# Patient Record
Sex: FEMALE | Race: WHITE | NOT HISPANIC OR LATINO | Employment: UNEMPLOYED | URBAN - METROPOLITAN AREA
[De-identification: names, ages, dates, MRNs, and addresses within clinical notes are randomized per-mention and may not be internally consistent; named-entity substitution may affect disease eponyms.]

---

## 2017-01-10 ENCOUNTER — OFFICE VISIT (OUTPATIENT)
Dept: MEDICAL GROUP | Facility: MEDICAL CENTER | Age: 32
End: 2017-01-10
Attending: NURSE PRACTITIONER
Payer: MEDICAID

## 2017-01-10 VITALS
WEIGHT: 234 LBS | OXYGEN SATURATION: 96 % | HEART RATE: 68 BPM | DIASTOLIC BLOOD PRESSURE: 70 MMHG | BODY MASS INDEX: 38.99 KG/M2 | TEMPERATURE: 97.1 F | SYSTOLIC BLOOD PRESSURE: 130 MMHG | HEIGHT: 65 IN | RESPIRATION RATE: 16 BRPM

## 2017-01-10 DIAGNOSIS — L29.9 ITCHING: ICD-10-CM

## 2017-01-10 DIAGNOSIS — L60.0 INGROWN TOENAIL: ICD-10-CM

## 2017-01-10 DIAGNOSIS — J45.20 MILD INTERMITTENT ASTHMA, UNCOMPLICATED: ICD-10-CM

## 2017-01-10 DIAGNOSIS — B35.1 NAIL FUNGAL INFECTION: ICD-10-CM

## 2017-01-10 DIAGNOSIS — B35.1 TOENAIL FUNGUS: ICD-10-CM

## 2017-01-10 DIAGNOSIS — J45.20 MILD INTERMITTENT ASTHMA WITHOUT COMPLICATION: ICD-10-CM

## 2017-01-10 DIAGNOSIS — G89.29 CHRONIC RIGHT-SIDED LOW BACK PAIN WITHOUT SCIATICA: ICD-10-CM

## 2017-01-10 DIAGNOSIS — E03.4 HYPOTHYROIDISM DUE TO ACQUIRED ATROPHY OF THYROID: ICD-10-CM

## 2017-01-10 DIAGNOSIS — M54.50 ACUTE RIGHT-SIDED LOW BACK PAIN WITHOUT SCIATICA: ICD-10-CM

## 2017-01-10 DIAGNOSIS — M54.50 CHRONIC RIGHT-SIDED LOW BACK PAIN WITHOUT SCIATICA: ICD-10-CM

## 2017-01-10 DIAGNOSIS — E55.9 VITAMIN D DEFICIENCY: ICD-10-CM

## 2017-01-10 DIAGNOSIS — F99 PSYCHIATRIC DISORDER: ICD-10-CM

## 2017-01-10 DIAGNOSIS — F19.10 POLYSUBSTANCE ABUSE (HCC): ICD-10-CM

## 2017-01-10 PROCEDURE — 99212 OFFICE O/P EST SF 10 MIN: CPT | Performed by: NURSE PRACTITIONER

## 2017-01-10 PROCEDURE — 99214 OFFICE O/P EST MOD 30 MIN: CPT | Performed by: NURSE PRACTITIONER

## 2017-01-10 RX ORDER — GABAPENTIN 300 MG/1
300 CAPSULE ORAL 2 TIMES DAILY
Qty: 60 CAP | Refills: 5 | Status: SHIPPED | OUTPATIENT
Start: 2017-01-10 | End: 2017-11-09

## 2017-01-10 RX ORDER — LIDOCAINE 50 MG/G
OINTMENT TOPICAL
Qty: 1 TUBE | Refills: 5 | Status: SHIPPED | OUTPATIENT
Start: 2017-01-10 | End: 2017-11-09

## 2017-01-10 RX ORDER — LEVOTHYROXINE SODIUM 0.03 MG/1
25 TABLET ORAL
Qty: 30 TAB | Refills: 5 | Status: SHIPPED | OUTPATIENT
Start: 2017-01-10 | End: 2017-11-09 | Stop reason: SDUPTHER

## 2017-01-10 RX ORDER — ALBUTEROL SULFATE 2.5 MG/3ML
2.5 SOLUTION RESPIRATORY (INHALATION) EVERY 4 HOURS PRN
Qty: 75 ML | Refills: 3 | Status: SHIPPED | OUTPATIENT
Start: 2017-01-10 | End: 2017-11-30 | Stop reason: SDUPTHER

## 2017-01-10 RX ORDER — PREDNISONE 10 MG/1
TABLET ORAL
Qty: 10 TAB | Refills: 0 | Status: SHIPPED | OUTPATIENT
Start: 2017-01-10 | End: 2017-11-09

## 2017-01-10 RX ORDER — HYDROXYZINE HYDROCHLORIDE 25 MG/1
25 TABLET, FILM COATED ORAL DAILY
Qty: 30 TAB | Refills: 3 | Status: SHIPPED | OUTPATIENT
Start: 2017-01-10 | End: 2017-11-09

## 2017-01-10 RX ORDER — IBUPROFEN 800 MG/1
400 TABLET ORAL EVERY 8 HOURS PRN
Qty: 30 TAB | Refills: 3 | Status: SHIPPED | OUTPATIENT
Start: 2017-01-10 | End: 2017-02-28

## 2017-01-10 RX ORDER — ALBUTEROL SULFATE 90 UG/1
2 AEROSOL, METERED RESPIRATORY (INHALATION) EVERY 6 HOURS PRN
Qty: 8.5 G | Refills: 5 | Status: SHIPPED | OUTPATIENT
Start: 2017-01-10 | End: 2017-11-30 | Stop reason: SDUPTHER

## 2017-01-10 NOTE — MR AVS SNAPSHOT
"        Kaylie Nietobarbara   1/10/2017 3:50 PM   Office Visit   MRN: [id number]    [facility]   Dept Phone:  [phone]    Description:  Female : 1985   Provider:  TIANA Her           Reason for Visit     Back Pain     Medication Refill           Allergies as of 1/10/2017     Allergen Noted Reactions    Penicillins 2007   Anaphylaxis    Morphine 2011   Rash    Rash only      You were diagnosed with     Mild intermittent asthma without complication   [977914]       Acute right-sided low back pain without sciatica   [3376147]       Hypothyroidism due to acquired atrophy of thyroid   [9922787]       Chronic right-sided low back pain without sciatica   [8766792]       Vitamin D deficiency   [1885062]       Mild intermittent asthma, uncomplicated   [176323]       Itching   [593170]       Ingrown toenail   [964321]       Nail fungal infection   [277862]       Polysubstance abuse   [434432]       Psychiatric disorder   [482630]       Toenail fungus   [738326]         Vital Signs     Blood Pressure Pulse Temperature Respirations Height Weight    130/70 mmHg 68 36.2 °C (97.1 °F) 16 1.651 m (5' 5\") 106.142 kg (234 lb)    Body Mass Index Oxygen Saturation Smoking Status             38.94 kg/m2 96% Current Every Day Smoker         Basic Information     Date Of Birth Sex Race Ethnicity Preferred Language    1985 Female White Non- English      Problem List              ICD-10-CM Priority Class Noted - Resolved    Polysubstance abuse F19.10   1/15/2012 - Present    Routine health maintenance Z00.00   2016 - Present    Psychiatric disorder F99   2016 - Present    Tobacco use Z72.0   2016 - Present    Overweight E66.3   2016 - Present    Mild intermittent asthma J45.20   2016 - Present    Toenail fungus B35.1   2016 - Present    Right-sided low back pain without sciatica M54.5   2016 - Present    Hypothyroidism due to acquired " atrophy of thyroid E03.4   5/31/2016 - Present    Vitamin D deficiency E55.9   5/31/2016 - Present    Encounter for sterilization Z30.2   6/16/2016 - Present    Dysmenorrhea N94.6   6/16/2016 - Present    Vaginal discharge N89.8   10/11/2016 - Present    Sore throat J02.9   10/31/2016 - Present      Health Maintenance        Date Due Completion Dates    IMM DTaP/Tdap/Td Vaccine (1 - Tdap) 1/15/2003 ---    IMM PNEUMOCOCCAL 19-64 (ADULT) MEDIUM RISK SERIES (1 of 1 - PPSV23) 1/15/2003 ---    PAP SMEAR 8/8/2016 8/8/2013    IMM INFLUENZA (1) 9/1/2016 ---            Current Immunizations     No immunizations on file.      Below and/or attached are the medications your provider expects you to take. Review all of your home medications and newly ordered medications with your provider and/or pharmacist. Follow medication instructions as directed by your provider and/or pharmacist. Please keep your medication list with you and share with your provider. Update the information when medications are discontinued, doses are changed, or new medications (including over-the-counter products) are added; and carry medication information at all times in the event of emergency situations     Allergies:  PENICILLINS - Anaphylaxis     MORPHINE - Rash               Medications  Valid as of: January 10, 2017 -  4:41 PM    Generic Name Brand Name Tablet Size Instructions for use    Albuterol Sulfate (Aero Soln) albuterol 108 (90 BASE) MCG/ACT Inhale 2 Puffs by mouth every 6 hours as needed for Shortness of Breath.        Albuterol Sulfate (Nebu Soln) PROVENTIL 2.5mg/3ml 3 mL by Nebulization route every four hours as needed for Shortness of Breath.        Cholecalciferol (Cap) Cholecalciferol 2000 UNIT Take 1 Cap by mouth every day.        Clotrimazole-Betamethasone (Cream) LOTRISONE 1-0.05 % Apply to toe twice a day        Gabapentin (Cap) NEURONTIN 300 MG Take 1 Cap by mouth 2 Times a Day.        HydrOXYzine HCl (Tab) ATARAX 25 MG Take 1 Tab  by mouth every day.        Ibuprofen (Tab) MOTRIN 800 MG Take 0.5 Tabs by mouth every 8 hours as needed.        Levothyroxine Sodium (Tab) SYNTHROID 25 MCG Take 1 Tab by mouth Every morning on an empty stomach.        Lidocaine (Ointment) XYLOCAINE 5 % Apply once a day to low back        Misc. Devices (Misc) Misc. Devices  Nebulizer for use with Albuterol solution at home Dx Asthma J45.20        Nicotine (PATCH 24 HR) NICODERM 14 MG/24HR Apply 1 Patch to skin as directed every 24 hours.        Nicotine (PATCH 24 HR) NICODERM 7 MG/24HR Apply 1 Patch to skin as directed every 24 hours.        PredniSONE (Tab) DELTASONE 10 MG Take 10 mg of prednisone daily for 10 days.        RisperiDONE (Tab) RISPERDAL 3 MG Take 3 mg by mouth every day.        TraZODone HCl (Tab) DESYREL 100 MG Take 200 mg by mouth every evening.        .                 Medicines prescribed today were sent to:     [address] [address]    Phone: [phone] Fax: [phone]    Open 24 Hours?: No      Medication refill instructions:       If your prescription bottle indicates you have medication refills left, it is not necessary to call your provider’s office. Please contact your pharmacy and they will refill your medication.    If your prescription bottle indicates you do not have any refills left, you may request refills at any time through one of the following ways: The online [employer] system (except Urgent Care), by calling your provider’s office, or by asking your pharmacy to contact your provider’s office with a refill request. Medication refills are processed only during regular business hours and may not be available until the next business day. Your provider may request additional information or to have a follow-up visit with you prior to refilling your medication.   *Please Note: Medication refills are assigned a new Rx number when refilled electronically. Your pharmacy may  indicate that no refills were authorized even though a new prescription for the same medication is available at the pharmacy. Please request the medicine by name with the pharmacy before contacting your provider for a refill.        Referral     A referral request has been sent to our patient care coordination department. Please allow 3-5 business days for us to process this request and contact you either by phone or mail. If you do not hear from us by the 5th business day, please call us at [phone].           MyChart Status: Patient Declined

## 2017-01-10 NOTE — PROGRESS NOTES
"    Chief Complaint: Med refills. Toenail-foot pain    HPI:  Kaylie presents to the clinic for back pain and refill of Inhaler    Established with    Psychiatry-DR Castano and in the [facility]    Her PMH includes    Anxiety, Depression, Bipolar Disorder  Marijuana use  Poly-Substance Abuse  Asthma  Tobacco use-Smoker  Vitamin D Deficiency  Over-weight  Hypothyroid  Toenail Fungus  Low Back Pain (Right sided without sciatica)    Review of Records shows  10/31/16 Clinic Visit for cough, sore throat and congestion. Tx as Bronchitis w Z albert, Prednisone, Robitussin DM.    10/11/16 Clinic Visit for Vaginal Discharge, Tx with Flagyl.    Toenail Fungus, Ingrown Toenail  Pt reports no change in right great toe nail. Reports using anti fungal cream and no improvement. Right great toe nail is   'super thick\" and \"digging into my skin\".   We discussed that toenail removal is probably warranted.  No Podiatry available, will refer to general surgeon.  If not able, then confer and see if able to do in clinic in future.    Mild intermittent asthma  Pt is a smoker and has asthma.  Not ready to quit. Reports needs refill of Albuterol Inhaler.  Ran out of albuterol inhaler 2 months ago. Reports has had tight wheezing on and off the past month.  We discussed restarting inhaler Rx and rx for nebulizer for home use. Pt not ready to quit smoking. Denies cough or fever.    Polysubstance abuse  Pt continues in [facility] court and states is drug free  And hoping to graduate from program in next few months.  Continues to smoke cigarettes ~ 1/2 ppd.   Has nicotine patches but not ready to quit despite our discussion of health hazards of smoking.    Hypothyroidism due to acquired atrophy of thyroid  Pt continues to take Synthroid 25 mcg/day. Denies concerns  Last TSH= 1.900 in August 2016. Discussed in next 3-6 months I recommend f/u TSH.    Vitamin D deficiency  Pt has known vitamin d deficiency.  Reports ran out " "of Vitamin d.  Has refills at pharmacy. Have instructed Pt to re start daily low dose vitamin d.    Psychiatric disorder  Pt continues with Dr Castano and [facility].  Continues on Trazodone and Risperdal.   Denies suicidal ideation and reports mood has been \"good\"        Patient Active Problem List    Diagnosis Date Noted   • Sore throat 10/31/2016   • Vaginal discharge 10/11/2016   • Encounter for sterilization 06/16/2016   • Dysmenorrhea 06/16/2016   • Hypothyroidism due to acquired atrophy of thyroid 05/31/2016   • Vitamin D deficiency 05/31/2016   • Right-sided low back pain without sciatica 05/12/2016   • Routine health maintenance 04/14/2016   • Psychiatric disorder 04/14/2016   • Tobacco use 04/14/2016   • Overweight 04/14/2016   • Mild intermittent asthma 04/14/2016   • Toenail fungus 04/14/2016   • Polysubstance abuse 01/15/2012       Allergies:Penicillins and Morphine    Current Outpatient Prescriptions   Medication Sig Dispense Refill   • gabapentin (NEURONTIN) 300 MG Cap Take 1 Cap by mouth 2 Times a Day. 60 Cap 5   • levothyroxine (SYNTHROID) 25 MCG Tab Take 1 Tab by mouth Every morning on an empty stomach. 30 Tab 5   • ibuprofen (MOTRIN) 800 MG Tab Take 0.5 Tabs by mouth every 8 hours as needed. 30 Tab 3   • Cholecalciferol 2000 UNIT Cap Take 1 Cap by mouth every day. 30 Cap 6   • albuterol 108 (90 BASE) MCG/ACT Aero Soln inhalation aerosol Inhale 2 Puffs by mouth every 6 hours as needed for Shortness of Breath. 8.5 g 5   • hydrOXYzine (ATARAX) 25 MG Tab Take 1 Tab by mouth every day. 30 Tab 3   • predniSONE (DELTASONE) 10 MG Tab Take 10 mg of prednisone daily for 10 days. 10 Tab 0   • Misc. Devices Misc Nebulizer for use with Albuterol solution at home Dx Asthma J45.20 1 Each 0   • albuterol (PROVENTIL) 2.5mg/3ml Nebu Soln solution for nebulization 3 mL by Nebulization route every four hours as needed for Shortness of Breath. 75 mL 3   • lidocaine (XYLOCAINE) 5 % Ointment Apply once a day to low back 1 " "Tube 5   • clotrimazole-betamethasone (LOTRISONE) 1-0.05 % Cream Apply to toe twice a day 1 Tube 2   • risperidone (RISPERDAL) 3 MG Tab Take 3 mg by mouth every day.     • trazodone (DESYREL) 100 MG Tab Take 200 mg by mouth every evening.     • nicotine (NICODERM) 14 MG/24HR PATCH 24 HR Apply 1 Patch to skin as directed every 24 hours. 30 Patch 0   • nicotine (NICODERM) 7 MG/24HR PATCH 24 HR Apply 1 Patch to skin as directed every 24 hours. 30 Patch 1     No current facility-administered medications for this visit.       Social History   Substance Use Topics   • Smoking status: Current Every Day Smoker -- 0.50 packs/day for 11 years     Types: Cigarettes   • Smokeless tobacco: Never Used      Comment:  Patient has cut down to less than half a pack a day.   • Alcohol Use: No      Comment: does not drink       Family History   Problem Relation Age of Onset   • Diabetes Mother      Insulin   • Hypertension Mother    • Alcohol/Drug Mother        ROS:  Review of Systems   See HPI Above    Exam:  Blood pressure 130/70, pulse 68, temperature 36.2 °C (97.1 °F), resp. rate 16, height 1.651 m (5' 5\"), weight 106.142 kg (234 lb), SpO2 96 %.  General:  Well nourished, over-weight, well developed female in NAD  HENT:Head is grossly normal. PERRL.  Neck: Supple. Trachea is midline.  Pulmonary: Bilat expiratory wheezing to ausculation .  Normal effort. No rales, ronchi.  Cardiovascular: Regular rate and rhythm.  Abdomen-Abdomen is soft, No tenderness.  Upper extremities- Strong = . Good ROM  Lower extremities- neg for edema, redness, tenderness except Right Great toenail very thick and malformed and tender medial aspect of nail bed.  No redness.   Neuro- A & O x 4. Speech clear and appropriate.     Current medications, allergies, and problem list reviewed with patient and updated in  [facility] today.    Assessment/Plan:  1. Mild intermittent asthma without complication  predniSONE (DELTASONE) 10 MG Tab daily for 10 days    Misc. " Devices Misc-Nebulizer for home use.    albuterol (PROVENTIL) 2.5mg/3ml Nebu Soln solution for nebulization  Counseled to quit smoking, but not ready.   2. Acute right-sided low back pain without sciatica  gabapentin (NEURONTIN) 300 MG Cap-refill   3. Hypothyroidism due to acquired atrophy of thyroid  levothyroxine (SYNTHROID) 25 MCG Tab-refill   4. Chronic right-sided low back pain without sciatica  ibuprofen (MOTRIN) 800 MG Tab-refill    lidocaine (XYLOCAINE) 5 % Ointment-refill   5. Vitamin D deficiency  Cholecalciferol 2000 UNIT Cap-refill   6. Mild intermittent asthma, uncomplicated  albuterol 108 (90 BASE) MCG/ACT Aero Soln inhalation aerosol=-refill   7. Itching  hydrOXYzine (ATARAX) 25 MG Tab-refill   8. Ingrown toenail  REFERRAL TO GENERAL SURGERY  Daily epsom salts or soap and water soaks   9. Nail fungal infection  REFERRAL TO GENERAL SURGERY   10. Polysubstance abuse  Continue in program. Avoid any drugs or alcohol.   11. Psychiatric disorder  Continue meds per Dr Castano.   Follow up in 3 months. Call or return if questions, concerns, or worsening condition.

## 2017-01-11 NOTE — ASSESSMENT & PLAN NOTE
Pt has known vitamin d deficiency.  Reports ran out of Vitamin d.  Has refills at pharmacy. Have instructed Pt to re start daily low dose vitamin d.

## 2017-01-11 NOTE — ASSESSMENT & PLAN NOTE
Pt is a smoker and has asthma.  Not ready to quit. Reports needs refill of Albuterol Inhaler.  Ran out of albuterol inhaler 2 months ago. Reports has had tight wheezing on and off the past month.  We discussed restarting inhaler Rx and rx for nebulizer for home use. Pt not ready to quit smoking. Denies cough or fever.

## 2017-01-11 NOTE — ASSESSMENT & PLAN NOTE
"Pt reports no change in right great toe nail. Reports using anti fungal cream and no improvement. Right great toe nail is   'super thick\" and \"digging into my skin\".   We discussed that toenail removal is probably warranted.  No Podiatry available, will refer to general surgeon.  If not able, then confer and see if able to do in clinic in future.  "

## 2017-01-11 NOTE — ASSESSMENT & PLAN NOTE
"Pt continues with Dr Castano and [facility].  Continues on Trazodone and Risperdal.   Denies suicidal ideation and reports mood has been \"good\"    "

## 2017-01-11 NOTE — ASSESSMENT & PLAN NOTE
Pt continues to take Synthroid 25 mcg/day. Denies concerns  Last TSH= 1.900 in August 2016. Discussed in next 3-6 months I recommend f/u TSH.

## 2017-01-11 NOTE — ASSESSMENT & PLAN NOTE
Pt continues in [facility] mental health court and states is drug free  And hoping to graduate from program in next few months.  Continues to smoke cigarettes ~ 1/2 ppd.   Has nicotine patches but not ready to quit despite our discussion of health hazards of smoking.

## 2017-01-30 ENCOUNTER — OFFICE VISIT (OUTPATIENT)
Dept: MEDICAL GROUP | Facility: MEDICAL CENTER | Age: 32
End: 2017-01-30
Attending: NURSE PRACTITIONER
Payer: MEDICAID

## 2017-01-30 VITALS
WEIGHT: 243 LBS | SYSTOLIC BLOOD PRESSURE: 120 MMHG | BODY MASS INDEX: 40.48 KG/M2 | HEIGHT: 65 IN | HEART RATE: 84 BPM | RESPIRATION RATE: 20 BRPM | TEMPERATURE: 97.2 F | OXYGEN SATURATION: 96 % | DIASTOLIC BLOOD PRESSURE: 70 MMHG

## 2017-01-30 DIAGNOSIS — E03.4 HYPOTHYROIDISM DUE TO ACQUIRED ATROPHY OF THYROID: ICD-10-CM

## 2017-01-30 DIAGNOSIS — L60.0 INGROWN TOENAIL: ICD-10-CM

## 2017-01-30 DIAGNOSIS — R82.998 INAPPROPRIATELY LOW URINE SPECIFIC GRAVITY: ICD-10-CM

## 2017-01-30 LAB
APPEARANCE UR: CLEAR
BILIRUB UR STRIP-MCNC: NEGATIVE MG/DL
COLOR UR AUTO: YELLOW
GLUCOSE UR STRIP.AUTO-MCNC: NEGATIVE MG/DL
KETONES UR STRIP.AUTO-MCNC: NEGATIVE MG/DL
LEUKOCYTE ESTERASE UR QL STRIP.AUTO: NEGATIVE
NITRITE UR QL STRIP.AUTO: NEGATIVE
PH UR STRIP.AUTO: 5 [PH] (ref 5–8)
PROT UR QL STRIP: NEGATIVE MG/DL
RBC UR QL AUTO: NEGATIVE
SP GR UR STRIP.AUTO: 1.02
UROBILINOGEN UR STRIP-MCNC: NEGATIVE MG/DL

## 2017-01-30 PROCEDURE — 99213 OFFICE O/P EST LOW 20 MIN: CPT | Performed by: NURSE PRACTITIONER

## 2017-01-30 PROCEDURE — 99214 OFFICE O/P EST MOD 30 MIN: CPT | Performed by: NURSE PRACTITIONER

## 2017-01-30 NOTE — ASSESSMENT & PLAN NOTE
Pt reports has had diluted urine when taking court drug tests.  Pt reports is drinking coffee and poweraide.  3 cups a coffee before test.   Discussed she should not drink poweraide or coffee and to monitor her fluid intake.

## 2017-01-30 NOTE — ASSESSMENT & PLAN NOTE
Right big toenail is still causing pain.   Has not got call back from surgeon r/t surgical procedure.  Pt to continue soaking in epsom salts and to call again [facility] for appt.

## 2017-01-30 NOTE — ASSESSMENT & PLAN NOTE
Pt continues to take her Synthroid 25 mcg/day.  Denies palpitations or fatigue. Her last TSH= 1.900 in August, 2016.

## 2017-01-30 NOTE — MR AVS SNAPSHOT
"        Kaylie Burciaga   2017 3:10 PM   Office Visit   MRN: [id number]    Department[facility]   Dept Phone:  [phone]    Description:  Female : 1985   Provider:  TIANA Her           Reason for Visit     Nail Problem           Allergies as of 2017     Allergen Noted Reactions    Penicillins 2007   Anaphylaxis    Morphine 2011   Rash    Rash only      You were diagnosed with     Inappropriately low urine specific gravity   [5818159]       Ingrown toenail   [253357]         Vital Signs     Blood Pressure Pulse Temperature Respirations Height Weight    120/70 mmHg 84 36.2 °C (97.2 °F) 20 1.651 m (5' 5\") 110.224 kg (243 lb)    Body Mass Index Oxygen Saturation Last Menstrual Period Smoking Status          40.44 kg/m2 96% 12/10/2016 Current Every Day Smoker        Basic Information     Date Of Birth Sex Race Ethnicity Preferred Language    1985 Female White Non- English      Your appointments     2017  2:30 PM   Established Patient with TIANA Her   [facility] ([facility])    [address] [place] [id number]   [phone]           You will be receiving a confirmation call a few days before your appointment from our automated call confirmation system.              Problem List              ICD-10-CM Priority Class Noted - Resolved    Polysubstance abuse F19.10   1/15/2012 - Present    Routine health maintenance Z00.00   2016 - Present    Psychiatric disorder F99   2016 - Present    Tobacco use Z72.0   2016 - Present    Overweight E66.3   2016 - Present    Mild intermittent asthma J45.20   2016 - Present    Toenail fungus B35.1   2016 - Present    Right-sided low back pain without sciatica M54.5   2016 - Present    Hypothyroidism due to acquired atrophy of thyroid E03.4   2016 - Present    Vitamin D deficiency E55.9   2016 - Present    Encounter for sterilization " Z30.2   6/16/2016 - Present    Dysmenorrhea N94.6   6/16/2016 - Present    Vaginal discharge N89.8   10/11/2016 - Present    Sore throat J02.9   10/31/2016 - Present    Inappropriately low urine specific gravity R82.99   1/30/2017 - Present    Ingrown toenail L60.0   1/30/2017 - Present      Health Maintenance        Date Due Completion Dates    IMM DTaP/Tdap/Td Vaccine (1 - Tdap) 1/15/2003 ---    IMM PNEUMOCOCCAL 19-64 (ADULT) MEDIUM RISK SERIES (1 of 1 - PPSV23) 1/15/2003 ---    PAP SMEAR 8/8/2016 8/8/2013    IMM INFLUENZA (1) 9/1/2016 ---            Current Immunizations     No immunizations on file.      Below and/or attached are the medications your provider expects you to take. Review all of your home medications and newly ordered medications with your provider and/or pharmacist. Follow medication instructions as directed by your provider and/or pharmacist. Please keep your medication list with you and share with your provider. Update the information when medications are discontinued, doses are changed, or new medications (including over-the-counter products) are added; and carry medication information at all times in the event of emergency situations     Allergies:  PENICILLINS - Anaphylaxis     MORPHINE - Rash               Medications  Valid as of: January 30, 2017 -  3:27 PM    Generic Name Brand Name Tablet Size Instructions for use    Albuterol Sulfate (Aero Soln) albuterol 108 (90 BASE) MCG/ACT Inhale 2 Puffs by mouth every 6 hours as needed for Shortness of Breath.        Albuterol Sulfate (Nebu Soln) PROVENTIL 2.5mg/3ml 3 mL by Nebulization route every four hours as needed for Shortness of Breath.        Cholecalciferol (Cap) Cholecalciferol 2000 UNIT Take 1 Cap by mouth every day.        Clotrimazole-Betamethasone (Cream) LOTRISONE 1-0.05 % Apply to toe twice a day        Gabapentin (Cap) NEURONTIN 300 MG Take 1 Cap by mouth 2 Times a Day.        HydrOXYzine HCl (Tab) ATARAX 25 MG Take 1 Tab by mouth  every day.        Ibuprofen (Tab) MOTRIN 800 MG Take 0.5 Tabs by mouth every 8 hours as needed.        Levothyroxine Sodium (Tab) SYNTHROID 25 MCG Take 1 Tab by mouth Every morning on an empty stomach.        Lidocaine (Ointment) XYLOCAINE 5 % Apply once a day to low back        Misc. Devices (Misc) Misc. Devices  Nebulizer for use with Albuterol solution at home Dx Asthma J45.20        Nicotine (PATCH 24 HR) NICODERM 14 MG/24HR Apply 1 Patch to skin as directed every 24 hours.        Nicotine (PATCH 24 HR) NICODERM 7 MG/24HR Apply 1 Patch to skin as directed every 24 hours.        PredniSONE (Tab) DELTASONE 10 MG Take 10 mg of prednisone daily for 10 days.        RisperiDONE (Tab) RISPERDAL 3 MG Take 3 mg by mouth every day.        TraZODone HCl (Tab) DESYREL 100 MG Take 200 mg by mouth every evening.        .                 Medicines prescribed today were sent to:     [address] [address]    Phone: [phone] Fax: [phone]    Open 24 Hours?: No      Medication refill instructions:       If your prescription bottle indicates you have medication refills left, it is not necessary to call your provider’s office. Please contact your pharmacy and they will refill your medication.    If your prescription bottle indicates you do not have any refills left, you may request refills at any time through one of the following ways: The online [employer] system (except Urgent Care), by calling your provider’s office, or by asking your pharmacy to contact your provider’s office with a refill request. Medication refills are processed only during regular business hours and may not be available until the next business day. Your provider may request additional information or to have a follow-up visit with you prior to refilling your medication.   *Please Note: Medication refills are assigned a new Rx number when refilled electronically. Your pharmacy may indicate  that no refills were authorized even though a new prescription for the same medication is available at the pharmacy. Please request the medicine by name with the pharmacy before contacting your provider for a refill.        Your To Do List     Future Labs/Procedures Complete By Expires    BASIC METABOLIC PANEL  As directed 1/30/2018    OSMOLALITY SERUM  As directed 1/30/2018    OSMOLALITY URINE  As directed 1/30/2018    URINE SODIUM, 24 HR  As directed 1/30/2018         MyChart Status: Patient Declined

## 2017-01-30 NOTE — PROGRESS NOTES
Chief Complaint: Dilute urine with court drug screens, ingrown toenail.    HPI:  Kaylie presents to the clinic for concern over toenail issues and concern about urine being dilute for urine drug tests at court.    Established with    Psychiatry-DR Castano and in the [facility]    Her PMH includes    Anxiety, Depression, Bipolar Disorder  Marijuana use  Poly-Substance Abuse  Asthma  Tobacco use-Smoker  Vitamin D Deficiency  Over-weight  Hypothyroid  Toenail Fungus  Low Back Pain (Right sided without sciatica)    Review of Records shows  1/10/16 Clinic visit fo med refills and toenail/foot pain., Asthma exacerbation. Rx for Prednisone, Rx for home nebulizer, Med refills. Refer to Surgeon r/t ingrown toenail.  10/31/16 Clinic Visit for cough, sore throat and congestion. Tx as Bronchitis w Z albert, Prednisone, Robitussin DM.    10/11/16 Clinic Visit for Vaginal Discharge, Tx with Flagyl.    Inappropriately low urine specific gravity  Pt reports has had diluted urine when taking court drug tests.  Pt reports is drinking coffee and poweraide.  3 cups a coffee before test.   Discussed she should not drink poweraide or coffee and to monitor her fluid intake.    I researched one possibility which involves a possible side effect of Trazodone which is SIADH.  Discussed this with Dr Randolph, Herman ARCHER and will order urine tests to compare to serum osmolality  And will write note for patient stating it is a possible factor.    Ingrown toenail  Right big toenail is still causing pain.   Has not got call back from surgeon r/t surgical procedure.  Pt to continue soaking in epsom salts and to call again [facility] for appt.    Hypothyroidism due to acquired atrophy of thyroid  Pt continues to take her Synthroid 25 mcg/day.  Denies palpitations or fatigue. Her last TSH= 1.900 in August, 2016.      Patient Active Problem List    Diagnosis Date Noted   • Inappropriately low urine specific gravity 01/30/2017   •  Ingrown toenail 01/30/2017   • Sore throat 10/31/2016   • Vaginal discharge 10/11/2016   • Encounter for sterilization 06/16/2016   • Dysmenorrhea 06/16/2016   • Hypothyroidism due to acquired atrophy of thyroid 05/31/2016   • Vitamin D deficiency 05/31/2016   • Right-sided low back pain without sciatica 05/12/2016   • Routine health maintenance 04/14/2016   • Psychiatric disorder 04/14/2016   • Tobacco use 04/14/2016   • Overweight 04/14/2016   • Mild intermittent asthma 04/14/2016   • Toenail fungus 04/14/2016   • Polysubstance abuse 01/15/2012       Allergies:Penicillins and Morphine    Current Outpatient Prescriptions   Medication Sig Dispense Refill   • gabapentin (NEURONTIN) 300 MG Cap Take 1 Cap by mouth 2 Times a Day. 60 Cap 5   • levothyroxine (SYNTHROID) 25 MCG Tab Take 1 Tab by mouth Every morning on an empty stomach. 30 Tab 5   • ibuprofen (MOTRIN) 800 MG Tab Take 0.5 Tabs by mouth every 8 hours as needed. 30 Tab 3   • Cholecalciferol 2000 UNIT Cap Take 1 Cap by mouth every day. 30 Cap 6   • albuterol 108 (90 BASE) MCG/ACT Aero Soln inhalation aerosol Inhale 2 Puffs by mouth every 6 hours as needed for Shortness of Breath. 8.5 g 5   • hydrOXYzine (ATARAX) 25 MG Tab Take 1 Tab by mouth every day. 30 Tab 3   • predniSONE (DELTASONE) 10 MG Tab Take 10 mg of prednisone daily for 10 days. 10 Tab 0   • Misc. Devices Misc Nebulizer for use with Albuterol solution at home Dx Asthma J45.20 1 Each 0   • albuterol (PROVENTIL) 2.5mg/3ml Nebu Soln solution for nebulization 3 mL by Nebulization route every four hours as needed for Shortness of Breath. 75 mL 3   • lidocaine (XYLOCAINE) 5 % Ointment Apply once a day to low back 1 Tube 5   • nicotine (NICODERM) 14 MG/24HR PATCH 24 HR Apply 1 Patch to skin as directed every 24 hours. 30 Patch 0   • nicotine (NICODERM) 7 MG/24HR PATCH 24 HR Apply 1 Patch to skin as directed every 24 hours. 30 Patch 1   • clotrimazole-betamethasone (LOTRISONE) 1-0.05 % Cream Apply to toe  "twice a day 1 Tube 2   • risperidone (RISPERDAL) 3 MG Tab Take 3 mg by mouth every day.     • trazodone (DESYREL) 100 MG Tab Take 200 mg by mouth every evening.       No current facility-administered medications for this visit.       Social History   Substance Use Topics   • Smoking status: Current Every Day Smoker -- 0.50 packs/day for 11 years     Types: Cigarettes   • Smokeless tobacco: Never Used      Comment:  Patient has cut down to less than half a pack a day.   • Alcohol Use: No      Comment: does not drink       Family History   Problem Relation Age of Onset   • Diabetes Mother      Insulin   • Hypertension Mother    • Alcohol/Drug Mother        ROS:  Review of Systems   See HPI Above    Exam:  Blood pressure 120/70, pulse 84, temperature 36.2 °C (97.2 °F), resp. rate 20, height 1.651 m (5' 5\"), weight 110.224 kg (243 lb), last menstrual period 12/10/2016, SpO2 96 %.  General:  Well nourished, over weight,well developed female in NAD  HENT:Head is grossly normal. PERRL.  Neck: Supple. Trachea is midline.  Pulmonary: Clear to ausculation .  Normal effort. No rales, ronchi, or wheezing.   Cardiovascular: Regular rate and rhythm.  Abdomen-Abdomen is soft, No tenderness.  Upper extremities- Strong = . Good ROM  Lower extremities- neg for edema, redness, tenderness except mild tenderness to right great toe with toenail lifted and somewhat ingrown.  Neuro- A & O x 4. Speech clear and appropriate.     Current medications, allergies, and problem list reviewed with patient and updated in  [facility] today.    Assessment/Plan:  1. Inappropriately low urine specific gravity  URINE SODIUM, 24 HR    OSMOLALITY URINE    BASIC METABOLIC PANEL    OSMOLALITY SERUM    POCT Urinalysis--> Specific Gravity= 1.025   ( not dilute today)  Letter for patient about dilute urine and possible effect by Trazodone.   2. Ingrown toenail  Epsom salt soaks BID as discussed.   Pt to call [facility] and given contact info.   3. " Hypothyroidism due to acquired atrophy of thyroid  Continue Levothyroxine 25 mcg/day.   Follow up in 1 month. Call or return if questions, concerns, or worsening condition.

## 2017-01-30 NOTE — Clinical Note
January 30, 2017       Patient: Kaylie Burciaga   YOB: 1985   Date of Visit: 1/30/2017         To Whom It May Concern:    It is my medical opinion that Kaylie Burciaga may have some mild to moderate urine dilution issues possibly induced by one of her Psychiatric medications , Trazodone which one side effect can be SIADH( Syndrome of inappropriate Anti-diuretic hormone).   It is a difficult diagnosis to confirm. I have ordered further lab serum and urine tests.  If it would help, I could refer her to Nephrology to assist in determining if this is the case.    Please take this in to consideration with Kaylie Burciaga    If you have any questions or concerns, please don't hesitate to call [phone]          Sincerely,          CARLOS ALBERTO Her. (Nurse Practitioner)  Electronically Signed

## 2017-01-31 ENCOUNTER — SUPERVISING PHYSICIAN REVIEW (OUTPATIENT)
Dept: MEDICAL GROUP | Facility: MEDICAL CENTER | Age: 32
End: 2017-01-31

## 2017-01-31 NOTE — PROGRESS NOTES
I have reviewed and/or discussed the encounter dated 1/30/17 with rosalina.  Face to face encounter/ direct observation : no  Suggestions as follows discussed the patient having a dilute urine during the Urine Drug Screen she had to further evaluate her appropriate use of her narcotic medication.   Discussed the fact she is also on psychiatric medications which increase the possibility she may have SIADH. The SIADH may lead to a dilute urine, so a urine osmolality, urine and serum Na level will also be obtained. If there is still questions about her having SIADH, may also consider referring to nephrology or endocrine for assistance in the evaluation.    Brent Randolph MD  The patient is noted

## 2017-02-21 ENCOUNTER — HOSPITAL ENCOUNTER (OUTPATIENT)
Dept: LAB | Facility: MEDICAL CENTER | Age: 32
End: 2017-02-21
Attending: NURSE PRACTITIONER
Payer: MEDICAID

## 2017-02-21 DIAGNOSIS — R82.998 INAPPROPRIATELY LOW URINE SPECIFIC GRAVITY: ICD-10-CM

## 2017-02-21 LAB
ANION GAP SERPL CALC-SCNC: 7 MMOL/L (ref 0–11.9)
BUN SERPL-MCNC: 12 MG/DL (ref 8–22)
CALCIUM SERPL-MCNC: 9.2 MG/DL (ref 8.5–10.5)
CHLORIDE SERPL-SCNC: 107 MMOL/L (ref 96–112)
CO2 SERPL-SCNC: 23 MMOL/L (ref 20–33)
CREAT SERPL-MCNC: 0.78 MG/DL (ref 0.5–1.4)
GLUCOSE SERPL-MCNC: 91 MG/DL (ref 65–99)
OSMOLALITY SERPL: 286 MOSM/KG H2O (ref 278–298)
OSMOLALITY UR: 711 MOSM/KG H2O (ref 300–900)
POTASSIUM SERPL-SCNC: 4.2 MMOL/L (ref 3.6–5.5)
SODIUM SERPL-SCNC: 137 MMOL/L (ref 135–145)

## 2017-02-21 PROCEDURE — 84300 ASSAY OF URINE SODIUM: CPT

## 2017-02-21 PROCEDURE — 36415 COLL VENOUS BLD VENIPUNCTURE: CPT

## 2017-02-21 PROCEDURE — 83935 ASSAY OF URINE OSMOLALITY: CPT

## 2017-02-21 PROCEDURE — 80048 BASIC METABOLIC PNL TOTAL CA: CPT

## 2017-02-21 PROCEDURE — 83930 ASSAY OF BLOOD OSMOLALITY: CPT

## 2017-02-23 LAB
CREAT 24H UR-MCNC: 20 MG/DL
CREAT 24H UR-MRATE: NORMAL MG/D (ref 700–1600)
SODIUM 24H UR-SCNC: 46 MMOL/L
SODIUM 24H UR-SRATE: NORMAL MMOL/D (ref 51–286)
SPECIMEN VOL ?TM UR: NORMAL ML

## 2017-02-28 ENCOUNTER — OFFICE VISIT (OUTPATIENT)
Dept: MEDICAL GROUP | Facility: MEDICAL CENTER | Age: 32
End: 2017-02-28
Attending: NURSE PRACTITIONER
Payer: MEDICAID

## 2017-02-28 VITALS
BODY MASS INDEX: 40.82 KG/M2 | HEART RATE: 92 BPM | TEMPERATURE: 97.2 F | DIASTOLIC BLOOD PRESSURE: 60 MMHG | RESPIRATION RATE: 20 BRPM | SYSTOLIC BLOOD PRESSURE: 115 MMHG | OXYGEN SATURATION: 96 % | HEIGHT: 65 IN | WEIGHT: 245 LBS

## 2017-02-28 DIAGNOSIS — M54.50 RIGHT-SIDED LOW BACK PAIN WITHOUT SCIATICA, UNSPECIFIED CHRONICITY: ICD-10-CM

## 2017-02-28 DIAGNOSIS — E66.01 MORBID OBESITY WITH BMI OF 40.0-44.9, ADULT (HCC): ICD-10-CM

## 2017-02-28 DIAGNOSIS — E03.4 HYPOTHYROIDISM DUE TO ACQUIRED ATROPHY OF THYROID: ICD-10-CM

## 2017-02-28 DIAGNOSIS — Z72.0 TOBACCO USE: ICD-10-CM

## 2017-02-28 DIAGNOSIS — L60.0 INGROWN TOENAIL: ICD-10-CM

## 2017-02-28 DIAGNOSIS — R82.998 INAPPROPRIATELY LOW URINE SPECIFIC GRAVITY: ICD-10-CM

## 2017-02-28 PROCEDURE — 99214 OFFICE O/P EST MOD 30 MIN: CPT | Performed by: NURSE PRACTITIONER

## 2017-02-28 PROCEDURE — 99213 OFFICE O/P EST LOW 20 MIN: CPT | Performed by: NURSE PRACTITIONER

## 2017-02-28 RX ORDER — DICLOFENAC SODIUM 75 MG/1
75 TABLET, DELAYED RELEASE ORAL 2 TIMES DAILY
Qty: 60 TAB | Refills: 2 | Status: SHIPPED | OUTPATIENT
Start: 2017-02-28 | End: 2017-11-09

## 2017-02-28 NOTE — ASSESSMENT & PLAN NOTE
Pt continues to take Synthroid 25 mcg/day.  IN august her TSH was very good;  Results for SHUN WHITFIELD (MRN [id number]) as of 2/28/2017 15:11   Ref. Range 8/1/2016 09:27   TSH Latest Ref Range: 0.300-3.700 uIU/mL 1.900   Will continue at current dose.

## 2017-02-28 NOTE — PROGRESS NOTES
Chief Complaint: REsults r/t dilute urine. Low back pain    HPI:  Kaylie presents to the clinic for results and f/u on her dilute urine issues.    Her PMH includes:  Anxiety, Depression, Bipolar Disorder  Marijuana use  Poly-Substance Abuse  Asthma  Tobacco use-Smoker  Vitamin D Deficiency  Over-weight  Hypothyroid  Toenail Fungus  Low Back Pain (Right sided without sciatica)    Referral Approved:  General Surgeon- [facility] r/t her ingrown toenail.    Review of Records shows  1/30/17 Clinic visit for f/u on dilute urine at drug testing for the program she is enrolled. , Ingrown Toenail---Labs ordered, Referral to Gen'l Surgeon. Letter written for Patient for her program about her urine being dilute may be related to her Psychiatric Medication(Trazodone).  1/10/16 Clinic visit fo med refills and toenail/foot pain., Asthma exacerbation. Rx for Prednisone, Rx for home nebulizer, Med refills. Refer to Surgeon r/t ingrown toenail.  10/31/16 Clinic Visit for cough, sore throat and congestion. Tx as Bronchitis w Z albert, Prednisone, Robitussin DM.  10/11/16 Clinic Visit for Vaginal Discharge, Tx with Flagyl.    Results REview:    2/21/17 Labs - Urine Sodium normal, Urine Osmolality normal, BMP normal, Serum Osmolality normal    Morbid obesity with BMI of 40.0-44.9, adult (HCC)  Pt is overweight. We discussed importance of decreasing intake of sugar/carbs, increasing exercise and limiting portion size.  Pt reports is eating a lot of chicken wraps and banana's.  Recommended she avoid bananas and eat less carbs.      Inappropriately low urine specific gravity  We reviewed her normal urine studies, normal BMP, and normal Serum Osmolality.  Pt reports in the past she drank too much coffee she had dilute urine.  Last Thursday had a test and no results yet, but only drank 7-up.  I recommended she not drink so much fluid every day on the days she may get tested. Pt knows at 5 am and knows if she will be  "test that afternoon.  WE discussed that day to limit fluid and not drink coffee that day.    Ingrown toenail  Pt reports has not called for an appt with the Surgeon for her ingrown toenail, right great toe. States \"is the same\" and I recommended she get nail removed and have given her contact info for [facility] and to make appt.    Right-sided low back pain without sciatica  Pt reports had more R> L low back pain and has been worse this week.  States she has only been taking Gabapentin 300 mg once a day and not at night  Also is taking Motrin 1-2 a day. Denies saddle paresthesia or loss of bowel or bladder control  But now states \"sometimes\" has some pains in her legs she believes is related to her back.    Discussed she should take her Gabapentin twice a day.  Wants to change NSAID.   Also patient not using Lidocaine ointment but will p/u today.  IS doing stretches learned at past Physical Therapy.    Tobacco use  Pt has patches but has not started patches.  Is smoking about 1/2 ppd. Discussed setting date to quit and then start patches.  Is stressed about drug testing at her program.  Denies cough or fever.    Hypothyroidism due to acquired atrophy of thyroid  Pt continues to take Synthroid 25 mcg/day.  IN august her TSH was very good;  Results for ROSEANN SHUN LIU (MRN [id number]) as of 2/28/2017 15:11   Ref. Range 8/1/2016 09:27   TSH Latest Ref Range: 0.300-3.700 uIU/mL 1.900   Will continue at current dose.      Patient Active Problem List    Diagnosis Date Noted   • Morbid obesity with BMI of 40.0-44.9, adult ([facility]) 02/28/2017   • Inappropriately low urine specific gravity 01/30/2017   • Ingrown toenail 01/30/2017   • Sore throat 10/31/2016   • Vaginal discharge 10/11/2016   • Encounter for sterilization 06/16/2016   • Dysmenorrhea 06/16/2016   • Hypothyroidism due to acquired atrophy of thyroid 05/31/2016   • Vitamin D deficiency 05/31/2016   • Right-sided low back pain without sciatica 05/12/2016 "   • Routine health maintenance 04/14/2016   • Psychiatric disorder 04/14/2016   • Tobacco use 04/14/2016   • Overweight 04/14/2016   • Mild intermittent asthma 04/14/2016   • Toenail fungus 04/14/2016   • Polysubstance abuse 01/15/2012       Allergies:Penicillins and Morphine    Current Outpatient Prescriptions   Medication Sig Dispense Refill   • diclofenac EC (VOLTAREN) 75 MG Tablet Delayed Response Take 1 Tab by mouth 2 times a day. Take with food 60 Tab 2   • gabapentin (NEURONTIN) 300 MG Cap Take 1 Cap by mouth 2 Times a Day. 60 Cap 5   • levothyroxine (SYNTHROID) 25 MCG Tab Take 1 Tab by mouth Every morning on an empty stomach. 30 Tab 5   • Cholecalciferol 2000 UNIT Cap Take 1 Cap by mouth every day. 30 Cap 6   • albuterol 108 (90 BASE) MCG/ACT Aero Soln inhalation aerosol Inhale 2 Puffs by mouth every 6 hours as needed for Shortness of Breath. 8.5 g 5   • hydrOXYzine (ATARAX) 25 MG Tab Take 1 Tab by mouth every day. 30 Tab 3   • predniSONE (DELTASONE) 10 MG Tab Take 10 mg of prednisone daily for 10 days. 10 Tab 0   • Misc. Devices Misc Nebulizer for use with Albuterol solution at home Dx Asthma J45.20 1 Each 0   • albuterol (PROVENTIL) 2.5mg/3ml Nebu Soln solution for nebulization 3 mL by Nebulization route every four hours as needed for Shortness of Breath. 75 mL 3   • lidocaine (XYLOCAINE) 5 % Ointment Apply once a day to low back 1 Tube 5   • nicotine (NICODERM) 14 MG/24HR PATCH 24 HR Apply 1 Patch to skin as directed every 24 hours. 30 Patch 0   • nicotine (NICODERM) 7 MG/24HR PATCH 24 HR Apply 1 Patch to skin as directed every 24 hours. 30 Patch 1   • clotrimazole-betamethasone (LOTRISONE) 1-0.05 % Cream Apply to toe twice a day 1 Tube 2   • risperidone (RISPERDAL) 3 MG Tab Take 3 mg by mouth every day.     • trazodone (DESYREL) 100 MG Tab Take 200 mg by mouth every evening.       No current facility-administered medications for this visit.       Social History   Substance Use Topics   • Smoking status:  "Current Every Day Smoker -- 0.50 packs/day for 11 years     Types: Cigarettes   • Smokeless tobacco: Never Used      Comment:  Patient has cut down to less than half a pack a day.   • Alcohol Use: No      Comment: does not drink       Family History   Problem Relation Age of Onset   • Diabetes Mother      Insulin   • Hypertension Mother    • Alcohol/Drug Mother        ROS:  Review of Systems   See HPI Above    Exam:  Blood pressure 115/60, pulse 92, temperature 36.2 °C (97.2 °F), resp. rate 20, height 1.651 m (5' 5\"), weight 111.131 kg (245 lb), last menstrual period 02/10/2017, SpO2 96 %.  General:  Well nourished, obese, well developed female in mild distress.  HENT:Head is grossly normal. PERRL.  Neck: Supple. Trachea is midline.  Pulmonary: Clear to ausculation .  Normal effort. No rales, ronchi, or wheezing.   Cardiovascular: Regular rate and rhythm.  Abdomen-Abdomen is soft, protuberant,  No tenderness.  Upper extremities- Strong = . Good ROM  Lower extremities- neg for edema, redness, tenderness.  Neuro- A & O x 4. Speech clear and appropriate.     Current medications, allergies, and problem list reviewed with patient and [facility].    Assessment/Plan:  1. Morbid obesity with BMI of 40.0-44.9, adult ([facility])  Patient identified as having weight management issue.  Appropriate orders and counseling given.   2. Inappropriately low urine specific gravity  Pt to decrease her fluid intake and avoid caffeine.   3. Ingrown toenail  Pt to soak in Epsom Salts and Warm water daily.  Pt to call [facility] for appt.   4. Right-sided low back pain without sciatica, unspecified chronicity  diclofenac EC (VOLTAREN) 75 MG Tablet Delayed Response (Stop Motrin)  Continue Gabapentin but take BID as prescribed and not just once a day.  Pt to restart Lidocaine Ointment to low back BID   5. Tobacco use  Pt to set quit date. Has nicotine patches but has not made quit date.    6. Hypothyroidism due to " acquired atrophy of thyroid  Continue Synthroid 25 mcg/day   Follow up in 6 weeks. Call or return if questions, concerns, or worsening condition.

## 2017-02-28 NOTE — ASSESSMENT & PLAN NOTE
Pt is overweight. We discussed importance of decreasing intake of sugar/carbs, increasing exercise and limiting portion size.  Pt reports is eating a lot of chicken wraps and banana's.  Recommended she avoid bananas and eat less carbs.

## 2017-02-28 NOTE — MR AVS SNAPSHOT
"        Kaylie Burciaga   2017 2:50 PM   Office Visit   MRN: [id number]    [facility]   Dept Phone:  [phone]    Description:  Female : 1985   Provider:  TIANA Her           Reason for Visit     Results           Allergies as of 2017     Allergen Noted Reactions    Penicillins 2007   Anaphylaxis    Morphine 2011   Rash    Rash only      You were diagnosed with     Morbid obesity with BMI of 40.0-44.9, adult (CMS-HCC)   [065336]       Inappropriately low urine specific gravity   [6764101]       Ingrown toenail   [374380]       Right-sided low back pain without sciatica, unspecified chronicity   [7791990]       Tobacco use   [080168]       Hypothyroidism due to acquired atrophy of thyroid   [0986646]         Vital Signs     Blood Pressure Pulse Temperature Respirations Height Weight    115/60 mmHg 92 36.2 °C (97.2 °F) 20 1.651 m (5' 5\") 111.131 kg (245 lb)    Body Mass Index Oxygen Saturation Last Menstrual Period Smoking Status          40.77 kg/m2 96% 02/10/2017 Current Every Day Smoker        Basic Information     Date Of Birth Sex Race Ethnicity Preferred Language    1985 Female White Non- English      Problem List              ICD-10-CM Priority Class Noted - Resolved    Polysubstance abuse F19.10   1/15/2012 - Present    Routine health maintenance Z00.00   2016 - Present    Psychiatric disorder F99   2016 - Present    Tobacco use Z72.0   2016 - Present    Overweight E66.3   2016 - Present    Mild intermittent asthma J45.20   2016 - Present    Toenail fungus B35.1   2016 - Present    Right-sided low back pain without sciatica M54.5   2016 - Present    Hypothyroidism due to acquired atrophy of thyroid E03.4   2016 - Present    Vitamin D deficiency E55.9   2016 - Present    Encounter for sterilization Z30.2   2016 - Present    Dysmenorrhea N94.6   2016 - Present    Vaginal " discharge N89.8   10/11/2016 - Present    Sore throat J02.9   10/31/2016 - Present    Inappropriately low urine specific gravity R82.99   1/30/2017 - Present    Ingrown toenail L60.0   1/30/2017 - Present    Morbid obesity with BMI of 40.0-44.9, adult ([facility]) E66.01, Z68.41   2/28/2017 - Present      Health Maintenance        Date Due Completion Dates    IMM DTaP/Tdap/Td Vaccine (1 - Tdap) 1/15/2003 ---    IMM PNEUMOCOCCAL 19-64 (ADULT) MEDIUM RISK SERIES (1 of 1 - PPSV23) 1/15/2003 ---    PAP SMEAR 8/8/2016 8/8/2013    IMM INFLUENZA (1) 9/1/2016 ---            Current Immunizations     No immunizations on file.      Below and/or attached are the medications your provider expects you to take. Review all of your home medications and newly ordered medications with your provider and/or pharmacist. Follow medication instructions as directed by your provider and/or pharmacist. Please keep your medication list with you and share with your provider. Update the information when medications are discontinued, doses are changed, or new medications (including over-the-counter products) are added; and carry medication information at all times in the event of emergency situations     Allergies:  PENICILLINS - Anaphylaxis     MORPHINE - Rash               Medications  Valid as of: February 28, 2017 -  3:14 PM    Generic Name Brand Name Tablet Size Instructions for use    Albuterol Sulfate (Aero Soln) albuterol 108 (90 BASE) MCG/ACT Inhale 2 Puffs by mouth every 6 hours as needed for Shortness of Breath.        Albuterol Sulfate (Nebu Soln) PROVENTIL 2.5mg/3ml 3 mL by Nebulization route every four hours as needed for Shortness of Breath.        Cholecalciferol (Cap) Cholecalciferol 2000 UNIT Take 1 Cap by mouth every day.        Clotrimazole-Betamethasone (Cream) LOTRISONE 1-0.05 % Apply to toe twice a day        Diclofenac Sodium (Tablet Delayed Response) VOLTAREN 75 MG Take 1 Tab by mouth 2 times a day. Take with food         Gabapentin (Cap) NEURONTIN 300 MG Take 1 Cap by mouth 2 Times a Day.        HydrOXYzine HCl (Tab) ATARAX 25 MG Take 1 Tab by mouth every day.        Levothyroxine Sodium (Tab) SYNTHROID 25 MCG Take 1 Tab by mouth Every morning on an empty stomach.        Lidocaine (Ointment) XYLOCAINE 5 % Apply once a day to low back        Misc. Devices (Misc) Misc. Devices  Nebulizer for use with Albuterol solution at home Dx Asthma J45.20        Nicotine (PATCH 24 HR) NICODERM 14 MG/24HR Apply 1 Patch to skin as directed every 24 hours.        Nicotine (PATCH 24 HR) NICODERM 7 MG/24HR Apply 1 Patch to skin as directed every 24 hours.        PredniSONE (Tab) DELTASONE 10 MG Take 10 mg of prednisone daily for 10 days.        RisperiDONE (Tab) RISPERDAL 3 MG Take 3 mg by mouth every day.        TraZODone HCl (Tab) DESYREL 100 MG Take 200 mg by mouth every evening.        .                 Medicines prescribed today were sent to:     [facility] [address] [address]    Phone: [phone] Fax: [phone]    Open 24 Hours?: No      Medication refill instructions:       If your prescription bottle indicates you have medication refills left, it is not necessary to call your provider’s office. Please contact your pharmacy and they will refill your medication.    If your prescription bottle indicates you do not have any refills left, you may request refills at any time through one of the following ways: The online [facility] system (except Urgent Care), by calling your provider’s office, or by asking your pharmacy to contact your provider’s office with a refill request. Medication refills are processed only during regular business hours and may not be available until the next business day. Your provider may request additional information or to have a follow-up visit with you prior to refilling your medication.   *Please Note: Medication refills are assigned a new Rx number when refilled  electronically. Your pharmacy may indicate that no refills were authorized even though a new prescription for the same medication is available at the pharmacy. Please request the medicine by name with the pharmacy before contacting your provider for a refill.           MyChart Status: Patient Declined

## 2017-02-28 NOTE — ASSESSMENT & PLAN NOTE
Pt reports had more R> L low back pain and has been worse this week.  States she has only been taking Gabapentin 300 mg once a day and not at night  Also is taking Motrin 1-2 a day.    Discussed she should take her Gabapentin twice a day.  Wants to change NSAID.   Also patient not using Lidocaine ointment but will p/u today.  IS doing stretches learned at past Physical Therapy.

## 2017-02-28 NOTE — ASSESSMENT & PLAN NOTE
We reviewed her normal urine studies, normal BMP, and normal Serum Osmolality.  Pt reports in the past she drank too much coffee she had dilute urine.  Last Thursday had a test and no results yet, but only drank 7-up.  I recommended she not drink so much fluid every day on the days she may get tested. Pt knows at 5 am and knows if she will be test that afternoon.  WE discussed that day to limit fluid and not drink coffee that day.

## 2017-02-28 NOTE — ASSESSMENT & PLAN NOTE
Pt has patches but has not started patches.  Is smoking about 1/2 ppd. Discussed setting date to quit and then start patches.  Is stressed about drug testing at her program.  Denies cough or fever.

## 2017-11-07 ENCOUNTER — HOSPITAL ENCOUNTER (EMERGENCY)
Facility: MEDICAL CENTER | Age: 32
End: 2017-11-07
Attending: EMERGENCY MEDICINE
Payer: MEDICAID

## 2017-11-07 VITALS
HEIGHT: 64 IN | WEIGHT: 235.45 LBS | TEMPERATURE: 98.1 F | RESPIRATION RATE: 14 BRPM | OXYGEN SATURATION: 99 % | BODY MASS INDEX: 40.2 KG/M2 | SYSTOLIC BLOOD PRESSURE: 118 MMHG | HEART RATE: 76 BPM | DIASTOLIC BLOOD PRESSURE: 78 MMHG

## 2017-11-07 DIAGNOSIS — M54.42 ACUTE LEFT-SIDED LOW BACK PAIN WITH LEFT-SIDED SCIATICA: ICD-10-CM

## 2017-11-07 PROCEDURE — 99284 EMERGENCY DEPT VISIT MOD MDM: CPT

## 2017-11-07 PROCEDURE — A9270 NON-COVERED ITEM OR SERVICE: HCPCS | Performed by: EMERGENCY MEDICINE

## 2017-11-07 PROCEDURE — 700102 HCHG RX REV CODE 250 W/ 637 OVERRIDE(OP): Performed by: EMERGENCY MEDICINE

## 2017-11-07 RX ORDER — DIAZEPAM 5 MG/1
5 TABLET ORAL ONCE
Status: COMPLETED | OUTPATIENT
Start: 2017-11-07 | End: 2017-11-07

## 2017-11-07 RX ORDER — METHYLPREDNISOLONE 4 MG/1
TABLET ORAL
Qty: 1 KIT | Refills: 0 | Status: SHIPPED | OUTPATIENT
Start: 2017-11-07 | End: 2017-12-29

## 2017-11-07 RX ORDER — OXYCODONE HYDROCHLORIDE AND ACETAMINOPHEN 5; 325 MG/1; MG/1
1 TABLET ORAL ONCE
Status: COMPLETED | OUTPATIENT
Start: 2017-11-07 | End: 2017-11-07

## 2017-11-07 RX ORDER — HYDROCODONE BITARTRATE AND ACETAMINOPHEN 5; 325 MG/1; MG/1
1-2 TABLET ORAL EVERY 4 HOURS PRN
Qty: 20 TAB | Refills: 0 | Status: SHIPPED | OUTPATIENT
Start: 2017-11-07 | End: 2017-12-29

## 2017-11-07 RX ORDER — DIAZEPAM 5 MG/1
5 TABLET ORAL EVERY 6 HOURS PRN
Qty: 7 TAB | Refills: 0 | Status: SHIPPED | OUTPATIENT
Start: 2017-11-07 | End: 2017-12-29

## 2017-11-07 RX ADMIN — OXYCODONE HYDROCHLORIDE AND ACETAMINOPHEN 1 TABLET: 5; 325 TABLET ORAL at 19:53

## 2017-11-07 RX ADMIN — DIAZEPAM 5 MG: 5 TABLET ORAL at 19:54

## 2017-11-07 ASSESSMENT — PAIN SCALES - GENERAL: PAINLEVEL_OUTOF10: 5

## 2017-11-08 ENCOUNTER — PATIENT OUTREACH (OUTPATIENT)
Dept: HEALTH INFORMATION MANAGEMENT | Facility: OTHER | Age: 32
End: 2017-11-08

## 2017-11-08 NOTE — PROGRESS NOTES
Placed discharge outreach phone call to patient s/p ER discharge 11/7/17.  Received recording stating that pt has not set up voicemail.  Discharge outreach letter mailed to patient.

## 2017-11-08 NOTE — ED NOTES
Pt discharged from this ER as VSS. Pt is A&Ox4 leaves this ER with a steady gait. PT given all discharge instructions with Rx x2, education given with understanding and questions answered. Pt instructed to return to ER or call 911 if symptoms worsen. Pt states feels safe to be discharged and has all belongings in hand

## 2017-11-08 NOTE — ED PROVIDER NOTES
ED Provider Note    CHIEF COMPLAINT  Chief Complaint   Patient presents with   • Low Back Pain     since yesterday afternoon. Reports moving futon/sofa two weeks ago with mild pain but woke yesterday from nap with worsening back pain.       HPI  Kaylie Burciaga is a 32 y.o. female who presents To the emergency Department chief complaint of low back pain. Patient states that about 9 months ago she had some lower back problems did 6 weeks of physical therapy and was feeling very good for the last 6 months however 2 weeks ago she was moving a futon and felt pop in her lower back has had worsening pain for the past several weeks but woke up this morning with worsening pain in the lower back radiating to the left. She denies any radiates down her leg she denies any fevers nausea vomiting dysuria hematuria difficulty with bowel or bladder habits. She states the pain is currently 7 out of 10 made worse with movement she finds it hard to know from sitting to standing but denies any weakness.    REVIEW OF SYSTEMS  See HPI for further details. All other systems are negative.     PAST MEDICAL HISTORY   has a past medical history of Anxiety and depression; ASTHMA; bipolar; Blood transfusion (2010); Depression; Hypothyroidism due to acquired atrophy of thyroid (5/31/2016); and Nausea & vomiting (1/15/2012).    SOCIAL HISTORY  Social History     Social History Main Topics   • Smoking status: Current Every Day Smoker     Packs/day: 0.50     Years: 11.00     Types: Cigarettes   • Smokeless tobacco: Never Used      Comment:  Patient has cut down to less than half a pack a day.   • Alcohol use No      Comment: does not drink   • Drug use: No      Comment: Last smoked 1 year ago.   • Sexual activity: No      Comment: None       SURGICAL HISTORY   has a past surgical history that includes gastroscopy with biopsy (11/29/08); other (T & A 5 yrs ago); other abdominal surgery; tonsillectomy; and marco by laparoscopy  "(12/1/08).    CURRENT MEDICATIONS  Home Medications    **Home medications have not yet been reviewed for this encounter**         ALLERGIES  Allergies   Allergen Reactions   • Penicillins Anaphylaxis   • Morphine Rash     Rash only       PHYSICAL EXAM  VITAL SIGNS: /60   Pulse 79   Temp 36.3 °C (97.3 °F)   Resp 14   Ht 1.626 m (5' 4\")   Wt 106.8 kg (235 lb 7.2 oz)   SpO2 99%   BMI 40.42 kg/m²     Pulse ox interpretation: I interpret this pulse ox as normal.  Constitutional: Alert in no apparent distress.  HENT: Normocephalic, Atraumatic  Eyes: PERRL. Conjunctiva normal, non-icteric.   Heart: Regular rate and rythm, no murmurs.    Lungs: Clear to auscultation bilaterally. No resp distress, breath sounds equal  Abdomen: Non-tender, non-distended, normal bowel sounds  Back:  Midline L/s mild ttp, as well as R SI ttp, no swelling, no erythema, no steps offs  Skin: Warm, Dry, No erythema, No rash.   Neurologic: Alert and oriented, Grossly non-focal. Ambulates slowly and steady mildly favoring the R side        COURSE & MEDICAL DECISION MAKING  Pertinent Labs & Imaging studies reviewed. (See chart for details)    Patient presents the ED with lower back pain with sciatic-like symptoms on examination. She is not diaphoretic not an IV drug user at low concern for infectious causes an epidural abscess and by weakness in the lower extremities difficulty with bowel or bladder habits low concern for cauda equina syndrome. Patient likely reinjured her back trying to move something heavy, she denies any other trauma or falling. She'll be given pain management here as well as pain management home and sent home with a Medrol Dosepak.  The patient's PMPaware/narcotic history was reviewed prior to prescription - and there is no evidence of narcotic abuse.     /78   Pulse 76   Temp 36.7 °C (98.1 °F)   Resp 14   Ht 1.626 m (5' 4\")   Wt 106.8 kg (235 lb 7.2 oz)   LMP 10/29/2017   SpO2 99%   BMI 40.42 kg/m² "         I reviewed prescription monitoring program for patient's narcotic use before prescribing a scheduled drug.The patient will not drink alcohol nor drive with prescribed medications. The patient will return for new or worsening symptoms and is stable at the time of discharge.    The patient is referred to a primary physician for blood pressure management, diabetic screening, and for all other preventative health concerns.    DISPOSITION:  Patient will be discharged home in stable condition.    FOLLOW UP:  [facility]  [address]  [address]  [facility] [address]  [phone]    Schedule an appointment as soon as possible for a visit      [facility], Emergency Dept  [address] [facility] [id number] [phone]    If symptoms worsen - difficulty with urinating or defecating      OUTPATIENT MEDICATIONS:  New Prescriptions    DIAZEPAM (VALIUM) 5 MG TAB    Take 1 Tab by mouth every 6 hours as needed (back spasms).    HYDROCODONE-ACETAMINOPHEN (NORCO) 5-325 MG TAB PER TABLET    Take 1-2 Tabs by mouth every four hours as needed.    METHYLPREDNISOLONE (MEDROL DOSEPAK) 4 MG TABLET THERAPY PACK    Use as directed           FINAL IMPRESSION  1. Acute left-sided low back pain with left-sided sciatica        Electronically signed by: Kaylie Reynolds, 11/7/2017 7:19 PM    This dictation has been created using voice recognition software and/or scribes. The accuracy of the dictation is limited by the abilities of the software and the expertise of the scribes. I expect there may be some errors of grammar and possibly content. I made every attempt to manually correct the errors within my dictation. However, errors related to voice recognition software and/or scribes may still exist and should be interpreted within the appropriate context.

## 2017-11-08 NOTE — LETTER
Kaylie Burciaga  [address]  [place] [address]    November 8, 2017      Dear Kaylie Burciaga,    [facility] wants to ensure your discharge home is safe and you or your loved ones have had all of your questions answered regarding your care after you leave the hospital.    Our discharge team was unsuccessful in our attempts to contact you telephonically and we wanted to be sure that you had a list of resources and contact information should you have any questions regarding your hospital stay, follow-up instructions, or active medical symptoms.    Questions or Concerns Regarding… Contact   Medical Questions Related to Your Discharge  (7 days a week, 8am-5pm) Contact a Nurse Care Coordinator   [phone]   Medical Questions Not Related to Your Discharge  (24 hours a day / 7 days a week)  Contact the Nurse Health Line   [phone]    Medications or Discharge Instructions Refer to your discharge packet   or contact your -[phone]   Follow-up Appointment(s) Schedule your appointment via [employer]   or contact Scheduling [phone]   Billing Review your statement via [employer]  or contact Billing [phone]   Medical Records Review your records via [employer]   or contact Medical Records [phone]     You can also easily access your medical information, test results and upcoming appointments via the [employer] free online health management tool. You can learn more and sign up at [facility]. For assistance setting up your [employer] account, please call [phone].    Once again, we want to ensure your discharge home is safe and that you have a clear understanding of any next steps in your care. If you have any questions or concerns, please do not hesitate to contact us, we are here for you. Thank you for choosing [facility] for your healthcare needs.    Sincerely,      Your [facility] Healthcare Team

## 2017-11-08 NOTE — ED NOTES
This nurse breaking Buck Love.  Pt states she had her period <1weeks ago. Lower back pains 5/10. VSS. Pt medicated see MAR.

## 2017-11-08 NOTE — DISCHARGE INSTRUCTIONS
Do not drive, operate any machinery, or partake in dangerous activities that require maximum physical and mental performance while taking the prescribed pain killer. Do not take tylenol or tylenol containing products in addition to the pain killer that was prescibed, as the excess tylenol can cause life threatening liver problems. Do not combine this drug with alcohol or other sedatives or narcotics. Follow up with your primary care doctor in regards to the future management of this medication.      Sciatica  Sciatica is pain, weakness, numbness, or tingling along the path of the sciatic nerve. The nerve starts in the lower back and runs down the back of each leg. The nerve controls the muscles in the lower leg and in the back of the knee, while also providing sensation to the back of the thigh, lower leg, and the sole of your foot. Sciatica is a symptom of another medical condition. For instance, nerve damage or certain conditions, such as a herniated disk or bone spur on the spine, pinch or put pressure on the sciatic nerve. This causes the pain, weakness, or other sensations normally associated with sciatica. Generally, sciatica only affects one side of the body.  CAUSES   · Herniated or slipped disc.  · Degenerative disk disease.  · A pain disorder involving the narrow muscle in the buttocks (piriformis syndrome).  · Pelvic injury or fracture.  · Pregnancy.  · Tumor (rare).  SYMPTOMS   Symptoms can vary from mild to very severe. The symptoms usually travel from the low back to the buttocks and down the back of the leg. Symptoms can include:  · Mild tingling or dull aches in the lower back, leg, or hip.  · Numbness in the back of the calf or sole of the foot.  · Burning sensations in the lower back, leg, or hip.  · Sharp pains in the lower back, leg, or hip.  · Leg weakness.  · Severe back pain inhibiting movement.  These symptoms may get worse with coughing, sneezing, laughing, or prolonged sitting or standing.  Also, being overweight may worsen symptoms.  DIAGNOSIS   Your caregiver will perform a physical exam to look for common symptoms of sciatica. He or she may ask you to do certain movements or activities that would trigger sciatic nerve pain. Other tests may be performed to find the cause of the sciatica. These may include:  · Blood tests.  · X-rays.  · Imaging tests, such as an MRI or CT scan.  TREATMENT   Treatment is directed at the cause of the sciatic pain. Sometimes, treatment is not necessary and the pain and discomfort goes away on its own. If treatment is needed, your caregiver may suggest:  · Over-the-counter medicines to relieve pain.  · Prescription medicines, such as anti-inflammatory medicine, muscle relaxants, or narcotics.  · Applying heat or ice to the painful area.  · Steroid injections to lessen pain, irritation, and inflammation around the nerve.  · Reducing activity during periods of pain.  · Exercising and stretching to strengthen your abdomen and improve flexibility of your spine. Your caregiver may suggest losing weight if the extra weight makes the back pain worse.  · Physical therapy.  · Surgery to eliminate what is pressing or pinching the nerve, such as a bone spur or part of a herniated disk.  HOME CARE INSTRUCTIONS   · Only take over-the-counter or prescription medicines for pain or discomfort as directed by your caregiver.  · Apply ice to the affected area for 20 minutes, 3-4 times a day for the first 48-72 hours. Then try heat in the same way.  · Exercise, stretch, or perform your usual activities if these do not aggravate your pain.  · Attend physical therapy sessions as directed by your caregiver.  · Keep all follow-up appointments as directed by your caregiver.  · Do not wear high heels or shoes that do not provide proper support.  · Check your mattress to see if it is too soft. A firm mattress may lessen your pain and discomfort.  SEEK IMMEDIATE MEDICAL CARE IF:   · You lose control  of your bowel or bladder (incontinence).  · You have increasing weakness in the lower back, pelvis, buttocks, or legs.  · You have redness or swelling of your back.  · You have a burning sensation when you urinate.  · You have pain that gets worse when you lie down or awakens you at night.  · Your pain is worse than you have experienced in the past.  · Your pain is lasting longer than 4 weeks.  · You are suddenly losing weight without reason.  MAKE SURE YOU:  · Understand these instructions.  · Will watch your condition.  · Will get help right away if you are not doing well or get worse.     This information is not intended to replace advice given to you by your health care provider. Make sure you discuss any questions you have with your health care provider.     Document Released: 12/12/2002 Document Revised: 06/18/2013 Document Reviewed: 04/28/2013  [email or web address] Interactive Patient Education ©2016 [employer]

## 2017-11-08 NOTE — ED NOTES
Chief Complaint   Patient presents with   • Low Back Pain     since yesterday afternoon. Reports moving futon/sofa two weeks ago with mild pain but woke yesterday from nap with worsening back pain.     Ambulatory to triage for above. CMS intact. VSS. Explained triage process, to waiting room. Asked to inform RN if questions or concerns arise.

## 2017-11-09 ENCOUNTER — OFFICE VISIT (OUTPATIENT)
Dept: MEDICAL GROUP | Facility: MEDICAL CENTER | Age: 32
End: 2017-11-09
Attending: NURSE PRACTITIONER
Payer: MEDICAID

## 2017-11-09 VITALS
WEIGHT: 232 LBS | RESPIRATION RATE: 20 BRPM | TEMPERATURE: 97.4 F | HEIGHT: 65 IN | HEART RATE: 104 BPM | DIASTOLIC BLOOD PRESSURE: 72 MMHG | BODY MASS INDEX: 38.65 KG/M2 | OXYGEN SATURATION: 95 % | SYSTOLIC BLOOD PRESSURE: 124 MMHG

## 2017-11-09 DIAGNOSIS — M54.50 ACUTE BILATERAL LOW BACK PAIN WITHOUT SCIATICA: ICD-10-CM

## 2017-11-09 DIAGNOSIS — E03.4 HYPOTHYROIDISM DUE TO ACQUIRED ATROPHY OF THYROID: ICD-10-CM

## 2017-11-09 DIAGNOSIS — E66.3 OVERWEIGHT: ICD-10-CM

## 2017-11-09 DIAGNOSIS — M79.89 LEG SWELLING: ICD-10-CM

## 2017-11-09 PROCEDURE — 99214 OFFICE O/P EST MOD 30 MIN: CPT | Performed by: NURSE PRACTITIONER

## 2017-11-09 PROCEDURE — 99213 OFFICE O/P EST LOW 20 MIN: CPT | Performed by: NURSE PRACTITIONER

## 2017-11-09 RX ORDER — MELOXICAM 7.5 MG/1
7.5 TABLET ORAL DAILY
Qty: 30 TAB | Refills: 0 | Status: SHIPPED | OUTPATIENT
Start: 2017-11-09 | End: 2017-11-30

## 2017-11-09 RX ORDER — HYDROCHLOROTHIAZIDE 25 MG/1
12.5-25 TABLET ORAL DAILY
Qty: 20 TAB | Refills: 0 | Status: SHIPPED | OUTPATIENT
Start: 2017-11-09 | End: 2017-12-29

## 2017-11-09 RX ORDER — LEVOTHYROXINE SODIUM 0.03 MG/1
25 TABLET ORAL
Qty: 30 TAB | Refills: 5 | Status: SHIPPED | OUTPATIENT
Start: 2017-11-09 | End: 2018-04-10

## 2017-11-09 RX ORDER — BACLOFEN 10 MG/1
10 TABLET ORAL 3 TIMES DAILY
Qty: 30 TAB | Refills: 1 | Status: SHIPPED | OUTPATIENT
Start: 2017-11-09 | End: 2017-11-30 | Stop reason: SDUPTHER

## 2017-11-09 ASSESSMENT — PAIN SCALES - GENERAL: PAINLEVEL: 5=MODERATE PAIN

## 2017-11-09 ASSESSMENT — PATIENT HEALTH QUESTIONNAIRE - PHQ9: CLINICAL INTERPRETATION OF PHQ2 SCORE: 0

## 2017-11-10 NOTE — ASSESSMENT & PLAN NOTE
Pt reports mild ankle swelling to ankles and feet the past 2 days.  No hx of same, Denies itching.  Discussed importance of elevating legs.  Denies pain to legs.  Will do trial low dose diuretic.

## 2017-11-10 NOTE — ASSESSMENT & PLAN NOTE
Pt reports she stopped her thyroid medication a few months ago because she was tired of taking so many pills.  Her Psychiatrist recommended she see her PCP (me) and start back on her thyroid medication.  We discussed the importance of a euthyroid state and the negative effects of hypothyroid condition.  Pt is amenable to re-starting thyroid medication at her previous 25 mcg/day.  I have asked her to return in 1 month and at that time we can order a f/u TSH for her to get   ~ 5-6 weeks after restart of med to evaluate.

## 2017-11-10 NOTE — ASSESSMENT & PLAN NOTE
Pt is over weight but states since I last saw her in Feb she has lost wt on purpose.  Wt today= 232 lbs Feb Wt= 245 lbs.  Pt to continue reduction of sugar and carb intake  Due to he Low back injury she will have a difficult time in the near future exercising  We discussed importance of Physical Therapy for her Low back and I encouraged  Her to take full advantage of the therapy for healing.

## 2017-11-10 NOTE — PROGRESS NOTES
Chief Complaint: ER f/u acute low back strain/pain    HPI:  Kaylie presents to the clinic as a same day appt for ER f/u related to LBP.    Her PMH includes:    Anxiety, Depression, Bipolar Disorder  Marijuana use  Poly-Substance Abuse  Asthma  Tobacco use-Smoker  Vitamin D Deficiency  Over-weight  Hypothyroid  Toenail Fungus  Low Back Pain (Right sided without sciatica)     Previous Referral Approved:  General Surgeon- [facility] r/t her johann aggarwal.     [place]  [facility]  No entries yet, but RX for Norco and Valium prescribed yesterday 11/8/17 by ER MD for patient (along w Medrol Dose pack)    Review of Records shows:  11/7/17 ER visit for LBP after moving Futon 2 wks prior, mild pain then worse on 11/7 am w some left buttocks pain as well.  Rx for Valium for muscle relaxation, Norco for Pain, medrol Dose Tommy for inflammation.    2/28/17 Clinic visit for f/u on urine issue, Low Back PAIN. RX changed from Motrin to Voltaren, Gabapentin BID, Lidocaine ointment RX  1/30/17 Clinic visit for f/u on dilute urine at drug testing for the program she is enrolled. , Ingrown Toenail---Labs ordered, Referral to Gen'l Surgeon. Letter written for Patient for her program about her urine being dilute may be related to her Psychiatric Medication(Trazodone).  1/10/16 Clinic visit fo med refills and toenail/foot pain., Asthma exacerbation. Rx for Prednisone, Rx for home nebulizer, Med refills. Refer to Surgeon r/t ingrown toenail.  10/31/16 Clinic Visit for cough, sore throat and congestion. Tx as Bronchitis w Z tommy, Prednisone, Robitussin DM.  10/11/16 Clinic Visit for Vaginal Discharge, Tx with Flagyl.   June 2016 Physical Therapy for LBP    Acute bilateral low back pain without sciatica  Pt in ER yest for lbp.  States 2 wks prior lifting and moving futon felt the pain.  Pain for past few weeks was mild.  Yesterday am awoke with worse pain.  Denies any sciatica.  Seen in ER and rx for Valium, Norco and  Medrol Dose albert  States pain is modrate, went down a little.   Pt wants to see Chiropractor and I also recommended Physical Therapy.    Leg swelling  Pt reports mild ankle swelling to ankles and feet the past 2 days.  No hx of same, Denies itching.  Discussed importance of elevating legs.  Denies pain to legs.  Will do trial low dose diuretic.    Hypothyroidism due to acquired atrophy of thyroid  Pt reports she stopped her thyroid medication a few months ago because she was tired of taking so many pills.  Her Psychiatrist recommended she see her PCP (me) and start back on her thyroid medication.  We discussed the importance of a euthyroid state and the negative effects of hypothyroid condition.  Pt is amenable to re-starting thyroid medication at her previous 25 mcg/day.  I have asked her to return in 1 month and at that time we can order a f/u TSH for her to get   ~ 5-6 weeks after restart of med to evaluate.    Overweight  Pt is over weight but states since I last saw her in Feb she has lost wt on purpose.  Wt today= 232 lbs Feb Wt= 245 lbs.  Pt to continue reduction of sugar and carb intake  Due to he Low back injury she will have a difficult time in the near future exercising  We discussed importance of Physical Therapy for her Low back and I encouraged  Her to take full advantage of the therapy for healing.      Patient Active Problem List    Diagnosis Date Noted   • Acute bilateral low back pain without sciatica 11/09/2017   • Leg swelling 11/09/2017   • Morbid obesity with BMI of 40.0-44.9, adult ([facility]) 02/28/2017   • Inappropriately low urine specific gravity 01/30/2017   • Ingrown toenail 01/30/2017   • Sore throat 10/31/2016   • Vaginal discharge 10/11/2016   • Encounter for sterilization 06/16/2016   • Dysmenorrhea 06/16/2016   • Hypothyroidism due to acquired atrophy of thyroid 05/31/2016   • Vitamin D deficiency 05/31/2016   • Right-sided low back pain without sciatica 05/12/2016   • Routine health  maintenance 04/14/2016   • Psychiatric disorder 04/14/2016   • Tobacco use 04/14/2016   • Overweight 04/14/2016   • Mild intermittent asthma 04/14/2016   • Toenail fungus 04/14/2016   • Polysubstance abuse 01/15/2012       Allergies:Penicillins and Morphine    Current Outpatient Prescriptions   Medication Sig Dispense Refill   • hydrochlorothiazide (HYDRODIURIL) 25 MG Tab Take 0.5-1 Tabs by mouth every day. 20 Tab 0   • levothyroxine (SYNTHROID) 25 MCG Tab Take 1 Tab by mouth Every morning on an empty stomach. 30 Tab 5   • baclofen (LIORESAL) 10 MG Tab Take 1 Tab by mouth 3 times a day. 30 Tab 1   • meloxicam (MOBIC) 7.5 MG Tab Take 1 Tab by mouth every day. 30 Tab 0   • hydrocodone-acetaminophen (NORCO) 5-325 MG Tab per tablet Take 1-2 Tabs by mouth every four hours as needed. 20 Tab 0   • MethylPREDNISolone (MEDROL DOSEPAK) 4 MG Tablet Therapy Pack Use as directed 1 Kit 0   • diazepam (VALIUM) 5 MG Tab Take 1 Tab by mouth every 6 hours as needed (back spasms). 7 Tab 0   • risperidone (RISPERDAL) 3 MG Tab Take 3 mg by mouth every day.     • trazodone (DESYREL) 100 MG Tab Take 200 mg by mouth every evening.     • albuterol 108 (90 BASE) MCG/ACT Aero Soln inhalation aerosol Inhale 2 Puffs by mouth every 6 hours as needed for Shortness of Breath. 8.5 g 5   • albuterol (PROVENTIL) 2.5mg/3ml Nebu Soln solution for nebulization 3 mL by Nebulization route every four hours as needed for Shortness of Breath. 75 mL 3     No current facility-administered medications for this visit.        Social History   Substance Use Topics   • Smoking status: Current Every Day Smoker     Packs/day: 0.50     Years: 11.00     Types: Cigarettes   • Smokeless tobacco: Never Used      Comment:  Patient has cut down to less than half a pack a day.   • Alcohol use No      Comment: does not drink       Family History   Problem Relation Age of Onset   • Diabetes Mother      Insulin   • Hypertension Mother    • Alcohol/Drug Mother   "      ROS:  Review of Systems   See HPI Above        Exam:  Blood pressure 124/72, pulse (!) 104, temperature 36.3 °C (97.4 °F), resp. rate 20, height 1.651 m (5' 5\"), weight 105.2 kg (232 lb), last menstrual period 11/02/2017, SpO2 95 %.  General:  Well nourished, over-weight well developed female in moderate distress.  HENT:Head is grossly normal. PERRL and brisk.  Neck: Supple. Trachea is midline.  Pulmonary: Clear to ausculation .  Normal effort. No rales, ronchi, or wheezing.   Cardiovascular: Fast rate and regular rhythm.  Abdomen-Abdomen is soft, No tenderness.  Upper extremities- Strong = . Good ROM  Lower extremities- neg for edema, redness, tenderness. Pt gets up slowly from chair and standing w good balance  And able to walk w steady gait.  Neuro- A & O x 4. Speech clear though slightly thick and appropriate   Current medications, allergies, and problem list reviewed with patient and updated in  [facility] today.    Assessment/Plan:  1. Acute bilateral low back pain without sciatica  REFERRAL TO PHYSICAL THERAPY Reason for Therapy: Eval/Treat/Report    baclofen (LIORESAL) 10 MG Tab-to start after completion of ER Valium RX if having muscle spasms in low back.    meloxicam (MOBIC) 7.5 MG Tab-To start only if continue back pain, after completion of Medrol Dose Tommy from ER ( Pt instructed to take w food)   2. Leg swelling  hydrochlorothiazide (HYDRODIURIL) 25 MG Tab1/2 to 1 tablet  For swelling to ankles and feet for 2-3 days then stop. If recurs may re-start Hctz.   3. Hypothyroidism due to acquired atrophy of thyroid  levothyroxine (SYNTHROID) 25 MCG Tab  Pt to restart  Future---> need order for TSH to recheck.   4. Overweight  Pt to reduce sugar and carb intake   Follow up in 1 month.. Call or return if questions, concerns, or worsening condition.  "

## 2017-11-10 NOTE — ASSESSMENT & PLAN NOTE
Pt in ER yest for lbp.  States 2 wks prior lifting and moving futon felt the pain.  Pain for past few weeks was mild.  Yesterday am awoke with worse pain.  Denies any sciatica.  Seen in ER and rx for Valium, Norco and Medrol Dose albert  States pain is modrate, went down a little.   Pt wants to see Chiropractor and I also recommended Physical Therapy.

## 2017-11-30 ENCOUNTER — OFFICE VISIT (OUTPATIENT)
Dept: MEDICAL GROUP | Facility: MEDICAL CENTER | Age: 32
End: 2017-11-30
Attending: NURSE PRACTITIONER
Payer: MEDICAID

## 2017-11-30 VITALS
OXYGEN SATURATION: 97 % | TEMPERATURE: 97.5 F | SYSTOLIC BLOOD PRESSURE: 122 MMHG | BODY MASS INDEX: 37.32 KG/M2 | HEIGHT: 65 IN | HEART RATE: 80 BPM | WEIGHT: 224 LBS | RESPIRATION RATE: 20 BRPM | DIASTOLIC BLOOD PRESSURE: 82 MMHG

## 2017-11-30 DIAGNOSIS — R11.0 NAUSEA: ICD-10-CM

## 2017-11-30 DIAGNOSIS — E03.4 HYPOTHYROIDISM DUE TO ACQUIRED ATROPHY OF THYROID: ICD-10-CM

## 2017-11-30 DIAGNOSIS — M79.89 LEG SWELLING: ICD-10-CM

## 2017-11-30 DIAGNOSIS — J45.20 MILD INTERMITTENT ASTHMA, UNCOMPLICATED: ICD-10-CM

## 2017-11-30 DIAGNOSIS — E66.01 MORBID OBESITY WITH BMI OF 40.0-44.9, ADULT (HCC): ICD-10-CM

## 2017-11-30 DIAGNOSIS — J45.20 MILD INTERMITTENT ASTHMA WITHOUT COMPLICATION: ICD-10-CM

## 2017-11-30 DIAGNOSIS — G89.29 CHRONIC RIGHT-SIDED LOW BACK PAIN WITHOUT SCIATICA: ICD-10-CM

## 2017-11-30 DIAGNOSIS — M54.50 CHRONIC RIGHT-SIDED LOW BACK PAIN WITHOUT SCIATICA: ICD-10-CM

## 2017-11-30 DIAGNOSIS — K30 ACID INDIGESTION: ICD-10-CM

## 2017-11-30 DIAGNOSIS — M54.50 ACUTE BILATERAL LOW BACK PAIN WITHOUT SCIATICA: ICD-10-CM

## 2017-11-30 PROCEDURE — 99214 OFFICE O/P EST MOD 30 MIN: CPT | Performed by: NURSE PRACTITIONER

## 2017-11-30 RX ORDER — ALBUTEROL SULFATE 90 UG/1
2 AEROSOL, METERED RESPIRATORY (INHALATION) EVERY 6 HOURS PRN
Qty: 8.5 G | Refills: 5 | Status: SHIPPED | OUTPATIENT
Start: 2017-11-30 | End: 2018-04-10

## 2017-11-30 RX ORDER — PROMETHAZINE HYDROCHLORIDE 25 MG/1
25 TABLET ORAL EVERY 8 HOURS PRN
Qty: 30 TAB | Refills: 0 | Status: SHIPPED | OUTPATIENT
Start: 2017-11-30 | End: 2017-12-29

## 2017-11-30 RX ORDER — ALBUTEROL SULFATE 2.5 MG/3ML
2.5 SOLUTION RESPIRATORY (INHALATION) EVERY 4 HOURS PRN
Qty: 75 ML | Refills: 3 | Status: SHIPPED | OUTPATIENT
Start: 2017-11-30 | End: 2018-04-10

## 2017-11-30 RX ORDER — RANITIDINE 150 MG/1
150 TABLET ORAL 2 TIMES DAILY
Qty: 60 TAB | Refills: 11 | Status: SHIPPED | OUTPATIENT
Start: 2017-11-30 | End: 2017-12-29

## 2017-11-30 RX ORDER — BACLOFEN 10 MG/1
10 TABLET ORAL 3 TIMES DAILY
Qty: 30 TAB | Refills: 2 | Status: SHIPPED | OUTPATIENT
Start: 2017-11-30 | End: 2017-12-29

## 2017-11-30 ASSESSMENT — PAIN SCALES - GENERAL: PAINLEVEL: 8=MODERATE-SEVERE PAIN

## 2017-12-01 NOTE — PROGRESS NOTES
Chief Complaint: f/u Low back Pain    HPI:  Kaylie presents to the clinic for f/u on back pain.    Her PMH includes  Anxiety, Depression, Bipolar Disorder  Marijuana use  Poly-Substance Abuse  Asthma  Tobacco use-Smoker  Vitamin D Deficiency  Over-weight  Hypothyroid  Toenail Fungus  Low Back Pain (Right sided without sciatica)     Previous Referral Approved:  General Surgeon- [facility] r/t her ingrown toenail.     [place]  No entries yet, but RX for Norco and Valium prescribed yesterday 11/8/17 by ER MD for patient (along w Medrol Dose pack)     Review of Records shows:    11/9/17 Clinic Visit for ER f/u LBP, Bilat Leg Swelling, Pt had stopped her Synthroid on her own, To restart and get f/u TSH in 6 wks.  Referred to Physical Therapy, RX for Baclofen, Meloxicam, HctZ.    11/7/17 ER visit for LBP after moving Futon 2 wks prior, mild pain then worse on 11/7 am w some left buttocks pain as well.  Rx for Valium for muscle relaxation, Norco for Pain, medrol Dose Tommy for inflammation.     2/28/17 Clinic visit for f/u on urine issue, Low Back PAIN. RX changed from Motrin to Voltaren, Gabapentin BID, Lidocaine ointment RX  1/30/17 Clinic visit for f/u on dilute urine at drug testing for the program she is enrolled. , Ingrown Toenail---Labs ordered, Referral to Gen'l Surgeon. Letter written for Patient for her program about her urine being dilute may be related to her Psychiatric Medication(Trazodone).  1/10/16 Clinic visit fo med refills and toenail/foot pain., Asthma exacerbation. Rx for Prednisone, Rx for home nebulizer, Med refills. Refer to Surgeon r/t ingrown toenail.  10/31/16 Clinic Visit for cough, sore throat and congestion. Tx as Bronchitis w Z tommy, Prednisone, Robitussin DM.  10/11/16 Clinic Visit for Vaginal Discharge, Tx with Flagyl.   June 2016 Physical Therapy for LBP    Hypothyroidism due to acquired atrophy of thyroid  Pt reports she has been taking her Synthroid 25 mcg  "since she re-started at my recommendation at last visit  As she had stopped taking it on her own.  She reports  Will order f/u TSH for her to complete    Morbid obesity with BMI of 40.0-44.9, adult ([facility])  Pt is overweight. We discussed importance of her taking her Synthroid medication and   For her to reduce sugar/carb.      Right-sided low back pain without sciatica  Pt reports not taking not taking Meloxicam as did not want to take as is also Excedrin for headaches.   Has not established w Physical Therapy.  Recommended she do so.  Reports back pain is sometimes \"8\", even as high as \"10\",  Denies limping or saddle paresthesia.  Discussed she should continue muscle relaxant, heat and make appt for PT.    Nausea  Pt reports nausea for about 10 days after finding out her Dad had past away.  No diarrhea. Does have acid reflux as well.  Denies abd pain or dark stools.       Mild intermittent asthma  Pt reports she needs refills on albuterol inhaler and nebulizer.  She denies cough or wheezing recently.  Is not ready to quit smoking.    Leg swelling  Pt reports she only had to take a few days worth of diuretic and her   Leg swelling resolved.      Patient Active Problem List    Diagnosis Date Noted   • Acute bilateral low back pain without sciatica 11/09/2017   • Leg swelling 11/09/2017   • Morbid obesity with BMI of 40.0-44.9, adult ([facility]) 02/28/2017   • Inappropriately low urine specific gravity 01/30/2017   • Ingrown toenail 01/30/2017   • Sore throat 10/31/2016   • Vaginal discharge 10/11/2016   • Encounter for sterilization 06/16/2016   • Dysmenorrhea 06/16/2016   • Hypothyroidism due to acquired atrophy of thyroid 05/31/2016   • Vitamin D deficiency 05/31/2016   • Right-sided low back pain without sciatica 05/12/2016   • Routine health maintenance 04/14/2016   • Psychiatric disorder 04/14/2016   • Tobacco use 04/14/2016   • Mild intermittent asthma 04/14/2016   • Toenail fungus 04/14/2016   • Nausea 01/15/2012   • " Polysubstance abuse 01/15/2012       Allergies:Penicillins and Morphine    Current Outpatient Prescriptions   Medication Sig Dispense Refill   • baclofen (LIORESAL) 10 MG Tab Take 1 Tab by mouth 3 times a day. 30 Tab 2   • promethazine (PHENERGAN) 25 MG Tab Take 1 Tab by mouth every 8 hours as needed for Nausea/Vomiting. 30 Tab 0   • ranitidine (ZANTAC) 150 MG Tab Take 1 Tab by mouth 2 times a day. 60 Tab 11   • albuterol 108 (90 Base) MCG/ACT Aero Soln inhalation aerosol Inhale 2 Puffs by mouth every 6 hours as needed for Shortness of Breath. 8.5 g 5   • albuterol (PROVENTIL) 2.5mg/3ml Nebu Soln solution for nebulization 3 mL by Nebulization route every four hours as needed for Shortness of Breath. 75 mL 3   • hydrochlorothiazide (HYDRODIURIL) 25 MG Tab Take 0.5-1 Tabs by mouth every day. 20 Tab 0   • levothyroxine (SYNTHROID) 25 MCG Tab Take 1 Tab by mouth Every morning on an empty stomach. 30 Tab 5   • hydrocodone-acetaminophen (NORCO) 5-325 MG Tab per tablet Take 1-2 Tabs by mouth every four hours as needed. 20 Tab 0   • MethylPREDNISolone (MEDROL DOSEPAK) 4 MG Tablet Therapy Pack Use as directed 1 Kit 0   • diazepam (VALIUM) 5 MG Tab Take 1 Tab by mouth every 6 hours as needed (back spasms). 7 Tab 0   • risperidone (RISPERDAL) 3 MG Tab Take 3 mg by mouth every day.     • trazodone (DESYREL) 100 MG Tab Take 200 mg by mouth every evening.       No current facility-administered medications for this visit.        Social History   Substance Use Topics   • Smoking status: Current Every Day Smoker     Packs/day: 0.50     Years: 11.00     Types: Cigarettes   • Smokeless tobacco: Never Used      Comment:  Patient has cut down to less than half a pack a day.   • Alcohol use No      Comment: does not drink       Family History   Problem Relation Age of Onset   • Diabetes Mother      Insulin   • Hypertension Mother    • Alcohol/Drug Mother        ROS:  Review of Systems   See HPI Above    Exam:  Blood pressure 122/82,  "pulse 80, temperature 36.4 °C (97.5 °F), resp. rate 20, height 1.651 m (5' 5\"), weight 101.6 kg (224 lb), last menstrual period 11/22/2017, SpO2 97 %, not currently breastfeeding.  General:  Well nourished, over-weight, well developed female in NAD  HENT:Head is grossly normal. PERRL.  Neck: Supple. Trachea is midline.  Pulmonary: Speaks in full sentences with ease.  Normal effort.   Cardiovascular: Regular rate and rhythm.  Abdomen-Abdomen is soft, No tenderness.  Upper extremities-  Good ROM  Lower extremities- neg for edema, redness, tenderness.  Neuro- A & O x 4. Speech clear and appropriate.     Current medications, allergies, and problem list reviewed with patient and updated in  [facility] today.    Assessment/Plan:  1. Hypothyroidism due to acquired atrophy of thyroid  TSH in 5 weeks   2. Morbid obesity with BMI of 40.0-44.9, adult (HCC)  Pt to continue on Synthroid, Get F/U TSH at end of December.     Patient identified as having weight management issue.  Appropriate orders and counseling given.   3. Acute bilateral low back pain without sciatica   Chronic right-sided low back pain without sciatica  baclofen (LIORESAL) 10 MG Tab  Pt to [facility]    4. Nausea  promethazine (PHENERGAN) 25 MG Tab   5. Acid indigestion  ranitidine (ZANTAC) 150 MG Tab   6. Mild intermittent asthma, uncomplicated     7. Mild intermittent asthma without complication  albuterol 108 (90 Base) MCG/ACT Aero Soln inhalation aerosol    albuterol (PROVENTIL) 2.5mg/3ml Nebu Soln solution for nebulization   8. Leg swelling  Observe, rare use of diuretic prn. Elevate legs   Follow up in 6  Weeks for TSH results. Call or return if questions, concerns, or worsening condition.     "

## 2017-12-24 ENCOUNTER — HOSPITAL ENCOUNTER (EMERGENCY)
Facility: MEDICAL CENTER | Age: 32
End: 2017-12-24
Attending: EMERGENCY MEDICINE
Payer: MEDICAID

## 2017-12-24 ENCOUNTER — APPOINTMENT (OUTPATIENT)
Dept: RADIOLOGY | Facility: MEDICAL CENTER | Age: 32
End: 2017-12-24
Attending: EMERGENCY MEDICINE
Payer: MEDICAID

## 2017-12-24 VITALS
TEMPERATURE: 98.7 F | OXYGEN SATURATION: 97 % | HEIGHT: 65 IN | WEIGHT: 216.05 LBS | RESPIRATION RATE: 16 BRPM | HEART RATE: 69 BPM | BODY MASS INDEX: 36 KG/M2

## 2017-12-24 DIAGNOSIS — R10.13 EPIGASTRIC PAIN: ICD-10-CM

## 2017-12-24 DIAGNOSIS — K21.9 GASTROESOPHAGEAL REFLUX DISEASE WITHOUT ESOPHAGITIS: ICD-10-CM

## 2017-12-24 LAB
ALBUMIN SERPL BCP-MCNC: 4.3 G/DL (ref 3.2–4.9)
ALBUMIN/GLOB SERPL: 1.3 G/DL
ALP SERPL-CCNC: 54 U/L (ref 30–99)
ALT SERPL-CCNC: 8 U/L (ref 2–50)
ANION GAP SERPL CALC-SCNC: 10 MMOL/L (ref 0–11.9)
AST SERPL-CCNC: 15 U/L (ref 12–45)
BASOPHILS # BLD AUTO: 0.3 % (ref 0–1.8)
BASOPHILS # BLD: 0.03 K/UL (ref 0–0.12)
BILIRUB SERPL-MCNC: 0.5 MG/DL (ref 0.1–1.5)
BUN SERPL-MCNC: 10 MG/DL (ref 8–22)
CALCIUM SERPL-MCNC: 9.7 MG/DL (ref 8.5–10.5)
CHLORIDE SERPL-SCNC: 101 MMOL/L (ref 96–112)
CO2 SERPL-SCNC: 24 MMOL/L (ref 20–33)
CREAT SERPL-MCNC: 0.8 MG/DL (ref 0.5–1.4)
EOSINOPHIL # BLD AUTO: 0 K/UL (ref 0–0.51)
EOSINOPHIL NFR BLD: 0 % (ref 0–6.9)
ERYTHROCYTE [DISTWIDTH] IN BLOOD BY AUTOMATED COUNT: 49.5 FL (ref 35.9–50)
GFR SERPL CREATININE-BSD FRML MDRD: >60 ML/MIN/1.73 M 2
GLOBULIN SER CALC-MCNC: 3.3 G/DL (ref 1.9–3.5)
GLUCOSE SERPL-MCNC: 108 MG/DL (ref 65–99)
HCG SERPL QL: NEGATIVE
HCT VFR BLD AUTO: 39.5 % (ref 37–47)
HGB BLD-MCNC: 12.6 G/DL (ref 12–16)
IMM GRANULOCYTES # BLD AUTO: 0.04 K/UL (ref 0–0.11)
IMM GRANULOCYTES NFR BLD AUTO: 0.4 % (ref 0–0.9)
LIPASE SERPL-CCNC: 3 U/L (ref 11–82)
LYMPHOCYTES # BLD AUTO: 1.67 K/UL (ref 1–4.8)
LYMPHOCYTES NFR BLD: 16.5 % (ref 22–41)
MCH RBC QN AUTO: 25.7 PG (ref 27–33)
MCHC RBC AUTO-ENTMCNC: 31.9 G/DL (ref 33.6–35)
MCV RBC AUTO: 80.6 FL (ref 81.4–97.8)
MONOCYTES # BLD AUTO: 0.65 K/UL (ref 0–0.85)
MONOCYTES NFR BLD AUTO: 6.4 % (ref 0–13.4)
NEUTROPHILS # BLD AUTO: 7.72 K/UL (ref 2–7.15)
NEUTROPHILS NFR BLD: 76.4 % (ref 44–72)
NRBC # BLD AUTO: 0 K/UL
NRBC BLD-RTO: 0 /100 WBC
PLATELET # BLD AUTO: 360 K/UL (ref 164–446)
PMV BLD AUTO: 10.8 FL (ref 9–12.9)
POTASSIUM SERPL-SCNC: 3.4 MMOL/L (ref 3.6–5.5)
PROT SERPL-MCNC: 7.6 G/DL (ref 6–8.2)
RBC # BLD AUTO: 4.9 M/UL (ref 4.2–5.4)
SODIUM SERPL-SCNC: 135 MMOL/L (ref 135–145)
WBC # BLD AUTO: 10.1 K/UL (ref 4.8–10.8)

## 2017-12-24 PROCEDURE — 80053 COMPREHEN METABOLIC PANEL: CPT

## 2017-12-24 PROCEDURE — 76705 ECHO EXAM OF ABDOMEN: CPT

## 2017-12-24 PROCEDURE — 700111 HCHG RX REV CODE 636 W/ 250 OVERRIDE (IP): Performed by: EMERGENCY MEDICINE

## 2017-12-24 PROCEDURE — 96375 TX/PRO/DX INJ NEW DRUG ADDON: CPT

## 2017-12-24 PROCEDURE — 700102 HCHG RX REV CODE 250 W/ 637 OVERRIDE(OP): Performed by: EMERGENCY MEDICINE

## 2017-12-24 PROCEDURE — 96374 THER/PROPH/DIAG INJ IV PUSH: CPT

## 2017-12-24 PROCEDURE — 85025 COMPLETE CBC W/AUTO DIFF WBC: CPT

## 2017-12-24 PROCEDURE — 83690 ASSAY OF LIPASE: CPT

## 2017-12-24 PROCEDURE — 700105 HCHG RX REV CODE 258: Performed by: EMERGENCY MEDICINE

## 2017-12-24 PROCEDURE — C9113 INJ PANTOPRAZOLE SODIUM, VIA: HCPCS | Performed by: EMERGENCY MEDICINE

## 2017-12-24 PROCEDURE — 84703 CHORIONIC GONADOTROPIN ASSAY: CPT

## 2017-12-24 PROCEDURE — A9270 NON-COVERED ITEM OR SERVICE: HCPCS | Performed by: EMERGENCY MEDICINE

## 2017-12-24 PROCEDURE — 99284 EMERGENCY DEPT VISIT MOD MDM: CPT

## 2017-12-24 PROCEDURE — 36415 COLL VENOUS BLD VENIPUNCTURE: CPT

## 2017-12-24 RX ORDER — ONDANSETRON 2 MG/ML
4 INJECTION INTRAMUSCULAR; INTRAVENOUS ONCE
Status: COMPLETED | OUTPATIENT
Start: 2017-12-24 | End: 2017-12-24

## 2017-12-24 RX ORDER — OMEPRAZOLE 40 MG/1
40 CAPSULE, DELAYED RELEASE ORAL DAILY
Qty: 30 CAP | Refills: 1 | Status: SHIPPED | OUTPATIENT
Start: 2017-12-24 | End: 2018-04-10

## 2017-12-24 RX ORDER — HYDROMORPHONE HYDROCHLORIDE 2 MG/ML
1 INJECTION, SOLUTION INTRAMUSCULAR; INTRAVENOUS; SUBCUTANEOUS ONCE
Status: COMPLETED | OUTPATIENT
Start: 2017-12-24 | End: 2017-12-24

## 2017-12-24 RX ORDER — SODIUM CHLORIDE 9 MG/ML
1000 INJECTION, SOLUTION INTRAVENOUS CONTINUOUS
Status: ACTIVE | OUTPATIENT
Start: 2017-12-24 | End: 2017-12-24

## 2017-12-24 RX ORDER — HYDROCODONE BITARTRATE AND ACETAMINOPHEN 5; 325 MG/1; MG/1
1-2 TABLET ORAL EVERY 8 HOURS PRN
Qty: 15 TAB | Refills: 0 | Status: SHIPPED | OUTPATIENT
Start: 2017-12-24 | End: 2017-12-27

## 2017-12-24 RX ORDER — PROMETHAZINE HYDROCHLORIDE 25 MG/1
25 SUPPOSITORY RECTAL ONCE
Status: COMPLETED | OUTPATIENT
Start: 2017-12-24 | End: 2017-12-24

## 2017-12-24 RX ORDER — PANTOPRAZOLE SODIUM 40 MG/10ML
40 INJECTION, POWDER, LYOPHILIZED, FOR SOLUTION INTRAVENOUS ONCE
Status: COMPLETED | OUTPATIENT
Start: 2017-12-24 | End: 2017-12-24

## 2017-12-24 RX ORDER — PROMETHAZINE HYDROCHLORIDE 25 MG/1
25 SUPPOSITORY RECTAL EVERY 6 HOURS PRN
Qty: 10 SUPPOSITORY | Refills: 1 | Status: SHIPPED | OUTPATIENT
Start: 2017-12-24 | End: 2017-12-29

## 2017-12-24 RX ADMIN — SODIUM CHLORIDE 1000 ML: 9 INJECTION, SOLUTION INTRAVENOUS at 11:10

## 2017-12-24 RX ADMIN — HYDROMORPHONE HYDROCHLORIDE 1 MG: 2 INJECTION INTRAMUSCULAR; INTRAVENOUS; SUBCUTANEOUS at 13:32

## 2017-12-24 RX ADMIN — PANTOPRAZOLE SODIUM 40 MG: 40 INJECTION, POWDER, FOR SOLUTION INTRAVENOUS at 15:06

## 2017-12-24 RX ADMIN — PROMETHAZINE HYDROCHLORIDE 25 MG: 25 SUPPOSITORY RECTAL at 11:42

## 2017-12-24 RX ADMIN — ONDANSETRON 4 MG: 2 INJECTION INTRAMUSCULAR; INTRAVENOUS at 11:10

## 2017-12-24 ASSESSMENT — PAIN SCALES - GENERAL: PAINLEVEL_OUTOF10: 9

## 2017-12-24 NOTE — ED NOTES
33 y/o female bib EMS from home with c/o upper abd pain that began this morning along with nausea and vomiting which she has been experiencing for several days. Pt states the pain is similar to when she needed her gallbladder removed.

## 2017-12-24 NOTE — DISCHARGE INSTRUCTIONS
Abdominal Pain, Adult  Many things can cause belly (abdominal) pain. Most times, the belly pain is not dangerous. Many cases of belly pain can be watched and treated at home.  HOME CARE   · Do not take medicines that help you go poop (laxatives) unless told to by your doctor.  · Only take medicine as told by your doctor.  · Eat or drink as told by your doctor. Your doctor will tell you if you should be on a special diet.  GET HELP IF:  · You do not know what is causing your belly pain.  · You have belly pain while you are sick to your stomach (nauseous) or have runny poop (diarrhea).  · You have pain while you pee or poop.  · Your belly pain wakes you up at night.  · You have belly pain that gets worse or better when you eat.  · You have belly pain that gets worse when you eat fatty foods.  · You have a fever.  GET HELP RIGHT AWAY IF:   · The pain does not go away within 2 hours.  · You keep throwing up (vomiting).  · The pain changes and is only in the right or left part of the belly.  · You have bloody or tarry looking poop.  MAKE SURE YOU:   · Understand these instructions.  · Will watch your condition.  · Will get help right away if you are not doing well or get worse.     This information is not intended to replace advice given to you by your health care provider. Make sure you discuss any questions you have with your health care provider.     Document Released: 06/05/2009 Document Revised: 01/08/2016 Document Reviewed: 08/27/2014  [employer] Interactive Patient Education ©2016 [employer]    Gastroesophageal Reflux Scan  A gastroesophageal reflux scan is a procedure that is used to check for gastroesophageal reflux, which is the backward flow of stomach contents into the tube that carries food from the mouth to the stomach (esophagus). The scan can also show if any stomach contents are inhaled (aspirated) into your lungs. You may need this scan if you have symptoms such as heartburn, vomiting, swallowing  problems, or regurgitation. Regurgitation means that swallowed food is returning from the stomach to the esophagus.  For this scan, you will drink a liquid that contains a small amount of a radioactive substance (tracer). A scanner with a camera that detects the radioactive tracer is used to see if any of the material backs up into your esophagus.  LET YOUR HEALTH CARE PROVIDER KNOW ABOUT:  · Any allergies you have.  · All medicines you are taking, including vitamins, herbs, eye drops, creams, and over-the-counter medicines.  · Any blood disorders you have.  · Previous surgeries you have had.  · Any medical conditions you may have.  · If you are pregnant or you think that you may be pregnant.  · If you are breastfeeding.  RISKS AND COMPLICATIONS  Generally, this is a safe procedure. However, problems may occur, including:  · Exposure to radiation (a small amount).  · Allergic reaction to the radioactive substance. This is rare.  BEFORE THE PROCEDURE  · Ask your health care provider about changing or stopping your regular medicines. This is especially important if you are taking diabetes medicines or blood thinners.  · Follow your health care provider's instructions about eating or drinking restrictions.  PROCEDURE  · You will be asked to drink a liquid that contains a small amount of a radioactive tracer. This liquid will probably be similar to orange juice.  · You will assume a position lying on your back.  · A series of images will be taken of your esophagus and upper stomach.  · You may be asked to move into different positions to help determine if reflux occurs more often when you are in specific positions.  · For adults, an abdominal binder with an inflatable cuff may be placed on the belly (abdomen). This may be used to increase abdominal pressure. More images will be taken to see if the increased pressure causes reflux to occur.  The procedure may vary among health care providers and hospitals.  AFTER THE  PROCEDURE  · Return to your normal activities and your normal diet as directed by your health care provider.  · The radioactive tracer will leave your body over the next few days. Drink enough fluid to keep your urine clear or pale yellow. This will help to flush the tracer out of your body.  · It is your responsibility to obtain your test results. Ask your health care provider or the department performing the test when and how you will get your results.     This information is not intended to replace advice given to you by your health care provider. Make sure you discuss any questions you have with your health care provider.     Document Released: 02/08/2007 Document Revised: 01/08/2016 Document Reviewed: 09/29/2015  [employer] Interactive Patient Education ©2016 [employer]

## 2017-12-24 NOTE — ED NOTES
Pt discharged to home. Pt was given follow up instructions and prescriptions for norco, phenergan and protonix. Pt verbalized understanding of all instructions for discharge and is ambulatory out of ED with steady gait.

## 2017-12-25 NOTE — ED PROVIDER NOTES
ED Provider Note    CHIEF COMPLAINT  Chief Complaint   Patient presents with   • Abdominal Pain     upper abd   • N/V       HPI  Kaylie Burciaga is a 32 y.o. female who presents to emergency today with complaints of upper abdominal pain, nausea vomiting. She states she's had nausea and vomiting for several weeks. She been placed on Phenergan was not working today so came to the emergency room she's also has some upper abdominal pain described as burning rate slightly to his side no chest pain or shortness of breath. Currently pain is rated at a 9/10. She's had no diarrhea no changes to latter or burning with urination. She has a previous history of cholecystectomy in 2008. States that she has had problems ever since this operation.    REVIEW OF SYSTEMS  See HPI for further details. All other systems are negative.     PAST MEDICAL HISTORY  Past Medical History:   Diagnosis Date   • Hypothyroidism due to acquired atrophy of thyroid 5/31/2016   • Nausea & vomiting 1/15/2012   • Blood transfusion 2010    Gallbladder removed and had something punctured   • Anxiety and depression    • ASTHMA     since birth, used albuterol as needed   • bipolar    • Depression     at age 11       FAMILY HISTORY  [unfilled]    SOCIAL HISTORY  Social History     Social History   • Marital status: Single     Spouse name: N/A   • Number of children: N/A   • Years of education: N/A     Social History Main Topics   • Smoking status: Current Every Day Smoker     Packs/day: 0.50     Years: 11.00     Types: Cigarettes   • Smokeless tobacco: Never Used      Comment:  Patient has cut down to less than half a pack a day.   • Alcohol use No      Comment: does not drink   • Drug use: No      Comment: Last smoked 1 year ago.   • Sexual activity: No      Comment: None     Other Topics Concern   • Not on file     Social History Narrative   • No narrative on file       SURGICAL HISTORY  Past Surgical History:   Procedure Laterality Date   • STEFAN BY  "LAPAROSCOPY  12/1/08    Performed by BELLA ROQUE at SURGERY [facility]   • GASTROSCOPY WITH BIOPSY  11/29/08    Performed by GONZALES NAYAK at ENDOSCOPY [facility]   • OTHER  T & A 5 yrs ago   • OTHER ABDOMINAL SURGERY     • TONSILLECTOMY      Age at 17       CURRENT MEDICATIONS  Home Medications    **Home medications have not yet been reviewed for this encounter**         ALLERGIES  Allergies   Allergen Reactions   • Penicillins Anaphylaxis   • Morphine Rash     Rash only       PHYSICAL EXAM  VITAL SIGNS: Pulse 69   Temp 37.1 °C (98.7 °F)   Resp 16   Ht 1.651 m (5' 5\")   Wt 98 kg (216 lb 0.8 oz)   SpO2 97%   BMI 35.95 kg/m²  Room air O2: 94    Constitutional: Well developed, Well nourished,  acute distress, Non-toxic appearance.   HENT: Normocephalic, Atraumatic, Bilateral external ears normal, Oropharynx moist, No oral exudates, Nose normal.   Eyes: PERRLA, EOMI, Conjunctiva normal, No discharge.   Neck: Normal range of motion, No tenderness, Supple, No stridor.   Lymphatic: No lymphadenopathy noted.   Cardiovascular: Normal heart rate, Normal rhythm, No murmurs, No rubs, No gallops.   Thorax & Lungs: Normal breath sounds, No respiratory distress, No wheezing, No chest tenderness.   Abdomen: Bowel sounds normal, Soft, Epigastric tenderness, No masses, No pulsatile masses.  No rebound, guarding or peritoneal signs noted  Skin: Warm, Dry, No erythema, No rash.   Back: No tenderness, No CVA tenderness.   Extremities: Intact distal pulses, No edema, No tenderness, No cyanosis, No clubbing.   Musculoskeletal: Good range of motion in all major joints. No tenderness to palpation or major deformities noted.   Neurologic: Alert & oriented x 3, Normal motor function, Normal sensory function, No focal deficits noted.   Psychiatric: Affect normal, Judgment normal, Mood normal.         RADIOLOGY/PROCEDURES  US-LIVER AND BILIARY TREE   Final Result      1.  Dilated common bile duct, possibly " related to prior cholecystectomy. No choledocholithiasis is identified. If clinically indicated, MRCP may be helpful for further evaluation.      2.  Status post cholecystectomy.            COURSE & MEDICAL DECISION MAKING  Pertinent Labs & Imaging studies reviewed. (See chart for details)  Ultrasound was negative for an acute process, patient had normal white blood cell count is afebrile. She had some vomiting here was given Phenergan suppository and Zofran with resolution in the hold down fluids. She was continuing to have abdominal pain was given Dilaudid 1 mg with resolution of this and given Protonix IV. She is placed on Prilosec for home 40 mg, given referral to digestive [facility] for follow-up I suspect that she would need EGD and further workup for her chronic gastritis/abdominal pain. Patient prior to discharge and reexamination abdomen soft, supple, nonsurgical . She is placed abdominal pain instructions return if increased pain, fever or persistent or worsening symptoms over next 12-24 hours otherwise will follow up as directed above. She is placed on Phenergan suppositories per her request, limited norco for pain.     reviewed.    FINAL IMPRESSION  1. Acute epigastric abdominal pain  2. Nausea/vomiting  3. GERD           Electronically signed by: Nik Alves, 12/24/2017 4:40 PM

## 2017-12-29 ENCOUNTER — HOSPITAL ENCOUNTER (OUTPATIENT)
Facility: MEDICAL CENTER | Age: 32
End: 2017-12-30
Attending: EMERGENCY MEDICINE | Admitting: HOSPITALIST
Payer: MEDICAID

## 2017-12-29 ENCOUNTER — APPOINTMENT (OUTPATIENT)
Dept: RADIOLOGY | Facility: MEDICAL CENTER | Age: 32
End: 2017-12-29
Attending: EMERGENCY MEDICINE
Payer: MEDICAID

## 2017-12-29 ENCOUNTER — RESOLUTE PROFESSIONAL BILLING HOSPITAL PROF FEE (OUTPATIENT)
Dept: HOSPITALIST | Facility: MEDICAL CENTER | Age: 32
End: 2017-12-29
Payer: MEDICAID

## 2017-12-29 DIAGNOSIS — R11.2 NAUSEA AND VOMITING, INTRACTABILITY OF VOMITING NOT SPECIFIED, UNSPECIFIED VOMITING TYPE: ICD-10-CM

## 2017-12-29 DIAGNOSIS — R11.2 INTRACTABLE VOMITING WITH NAUSEA, UNSPECIFIED VOMITING TYPE: ICD-10-CM

## 2017-12-29 DIAGNOSIS — N30.00 ACUTE CYSTITIS WITHOUT HEMATURIA: ICD-10-CM

## 2017-12-29 PROBLEM — M54.9 CHRONIC BACK PAIN: Status: ACTIVE | Noted: 2017-12-29

## 2017-12-29 PROBLEM — N39.0 UTI (URINARY TRACT INFECTION): Status: ACTIVE | Noted: 2017-12-29

## 2017-12-29 PROBLEM — E87.6 HYPOKALEMIA: Status: ACTIVE | Noted: 2017-12-29

## 2017-12-29 PROBLEM — G89.29 CHRONIC BACK PAIN: Status: ACTIVE | Noted: 2017-12-29

## 2017-12-29 LAB
ALBUMIN SERPL BCP-MCNC: 4.1 G/DL (ref 3.2–4.9)
ALBUMIN/GLOB SERPL: 1.4 G/DL
ALP SERPL-CCNC: 56 U/L (ref 30–99)
ALT SERPL-CCNC: 23 U/L (ref 2–50)
ANION GAP SERPL CALC-SCNC: 10 MMOL/L (ref 0–11.9)
APPEARANCE UR: ABNORMAL
AST SERPL-CCNC: 14 U/L (ref 12–45)
BACTERIA #/AREA URNS HPF: ABNORMAL /HPF
BASOPHILS # BLD AUTO: 0.4 % (ref 0–1.8)
BASOPHILS # BLD: 0.04 K/UL (ref 0–0.12)
BILIRUB SERPL-MCNC: 0.4 MG/DL (ref 0.1–1.5)
BILIRUB UR QL STRIP.AUTO: ABNORMAL
BUN SERPL-MCNC: 7 MG/DL (ref 8–22)
CALCIUM SERPL-MCNC: 9.5 MG/DL (ref 8.5–10.5)
CHLORIDE SERPL-SCNC: 99 MMOL/L (ref 96–112)
CO2 SERPL-SCNC: 27 MMOL/L (ref 20–33)
COLOR UR: ABNORMAL
CREAT SERPL-MCNC: 0.68 MG/DL (ref 0.5–1.4)
CULTURE IF INDICATED INDCX: YES UA CULTURE
EOSINOPHIL # BLD AUTO: 0.02 K/UL (ref 0–0.51)
EOSINOPHIL NFR BLD: 0.2 % (ref 0–6.9)
EPI CELLS #/AREA URNS HPF: ABNORMAL /HPF
ERYTHROCYTE [DISTWIDTH] IN BLOOD BY AUTOMATED COUNT: 47.8 FL (ref 35.9–50)
EST. AVERAGE GLUCOSE BLD GHB EST-MCNC: 114 MG/DL
GFR SERPL CREATININE-BSD FRML MDRD: >60 ML/MIN/1.73 M 2
GLOBULIN SER CALC-MCNC: 2.9 G/DL (ref 1.9–3.5)
GLUCOSE SERPL-MCNC: 101 MG/DL (ref 65–99)
GLUCOSE UR STRIP.AUTO-MCNC: NEGATIVE MG/DL
HBA1C MFR BLD: 5.6 % (ref 0–5.6)
HCG SERPL QL: NEGATIVE
HCT VFR BLD AUTO: 38 % (ref 37–47)
HGB BLD-MCNC: 12.2 G/DL (ref 12–16)
HYALINE CASTS #/AREA URNS LPF: ABNORMAL /LPF
IMM GRANULOCYTES # BLD AUTO: 0.03 K/UL (ref 0–0.11)
IMM GRANULOCYTES NFR BLD AUTO: 0.3 % (ref 0–0.9)
KETONES UR STRIP.AUTO-MCNC: ABNORMAL MG/DL
LEUKOCYTE ESTERASE UR QL STRIP.AUTO: ABNORMAL
LIPASE SERPL-CCNC: <3 U/L (ref 11–82)
LYMPHOCYTES # BLD AUTO: 2.31 K/UL (ref 1–4.8)
LYMPHOCYTES NFR BLD: 24.7 % (ref 22–41)
MCH RBC QN AUTO: 25.8 PG (ref 27–33)
MCHC RBC AUTO-ENTMCNC: 32.1 G/DL (ref 33.6–35)
MCV RBC AUTO: 80.3 FL (ref 81.4–97.8)
MICRO URNS: ABNORMAL
MONOCYTES # BLD AUTO: 0.8 K/UL (ref 0–0.85)
MONOCYTES NFR BLD AUTO: 8.6 % (ref 0–13.4)
MUCOUS THREADS #/AREA URNS HPF: ABNORMAL /HPF
NEUTROPHILS # BLD AUTO: 6.14 K/UL (ref 2–7.15)
NEUTROPHILS NFR BLD: 65.8 % (ref 44–72)
NITRITE UR QL STRIP.AUTO: NEGATIVE
NRBC # BLD AUTO: 0 K/UL
NRBC BLD-RTO: 0 /100 WBC
PH UR STRIP.AUTO: 5.5 [PH]
PLATELET # BLD AUTO: 350 K/UL (ref 164–446)
PMV BLD AUTO: 10.2 FL (ref 9–12.9)
POTASSIUM SERPL-SCNC: 3 MMOL/L (ref 3.6–5.5)
PROT SERPL-MCNC: 7 G/DL (ref 6–8.2)
PROT UR QL STRIP: NEGATIVE MG/DL
RBC # BLD AUTO: 4.73 M/UL (ref 4.2–5.4)
RBC # URNS HPF: ABNORMAL /HPF
RBC UR QL AUTO: NEGATIVE
RENAL EPI CELLS #/AREA URNS HPF: ABNORMAL /HPF
SODIUM SERPL-SCNC: 136 MMOL/L (ref 135–145)
SP GR UR STRIP.AUTO: 1.03
TSH SERPL DL<=0.005 MIU/L-ACNC: 0.5 UIU/ML (ref 0.38–5.33)
UROBILINOGEN UR STRIP.AUTO-MCNC: 1 MG/DL
WBC # BLD AUTO: 9.3 K/UL (ref 4.8–10.8)
WBC #/AREA URNS HPF: ABNORMAL /HPF

## 2017-12-29 PROCEDURE — 700105 HCHG RX REV CODE 258: Performed by: HOSPITALIST

## 2017-12-29 PROCEDURE — 83036 HEMOGLOBIN GLYCOSYLATED A1C: CPT

## 2017-12-29 PROCEDURE — 74177 CT ABD & PELVIS W/CONTRAST: CPT

## 2017-12-29 PROCEDURE — 84443 ASSAY THYROID STIM HORMONE: CPT

## 2017-12-29 PROCEDURE — G0378 HOSPITAL OBSERVATION PER HR: HCPCS

## 2017-12-29 PROCEDURE — 81001 URINALYSIS AUTO W/SCOPE: CPT

## 2017-12-29 PROCEDURE — A9270 NON-COVERED ITEM OR SERVICE: HCPCS | Performed by: STUDENT IN AN ORGANIZED HEALTH CARE EDUCATION/TRAINING PROGRAM

## 2017-12-29 PROCEDURE — 700111 HCHG RX REV CODE 636 W/ 250 OVERRIDE (IP): Performed by: EMERGENCY MEDICINE

## 2017-12-29 PROCEDURE — 85025 COMPLETE CBC W/AUTO DIFF WBC: CPT

## 2017-12-29 PROCEDURE — 36415 COLL VENOUS BLD VENIPUNCTURE: CPT

## 2017-12-29 PROCEDURE — 80053 COMPREHEN METABOLIC PANEL: CPT

## 2017-12-29 PROCEDURE — 96375 TX/PRO/DX INJ NEW DRUG ADDON: CPT

## 2017-12-29 PROCEDURE — 87086 URINE CULTURE/COLONY COUNT: CPT

## 2017-12-29 PROCEDURE — 700102 HCHG RX REV CODE 250 W/ 637 OVERRIDE(OP): Performed by: HOSPITALIST

## 2017-12-29 PROCEDURE — 700102 HCHG RX REV CODE 250 W/ 637 OVERRIDE(OP): Performed by: STUDENT IN AN ORGANIZED HEALTH CARE EDUCATION/TRAINING PROGRAM

## 2017-12-29 PROCEDURE — 700111 HCHG RX REV CODE 636 W/ 250 OVERRIDE (IP): Performed by: HOSPITALIST

## 2017-12-29 PROCEDURE — A9270 NON-COVERED ITEM OR SERVICE: HCPCS | Performed by: HOSPITALIST

## 2017-12-29 PROCEDURE — 96365 THER/PROPH/DIAG IV INF INIT: CPT | Mod: XU

## 2017-12-29 PROCEDURE — 96361 HYDRATE IV INFUSION ADD-ON: CPT

## 2017-12-29 PROCEDURE — 96374 THER/PROPH/DIAG INJ IV PUSH: CPT

## 2017-12-29 PROCEDURE — 700105 HCHG RX REV CODE 258: Performed by: EMERGENCY MEDICINE

## 2017-12-29 PROCEDURE — 96376 TX/PRO/DX INJ SAME DRUG ADON: CPT

## 2017-12-29 PROCEDURE — 700117 HCHG RX CONTRAST REV CODE 255: Performed by: EMERGENCY MEDICINE

## 2017-12-29 PROCEDURE — 83690 ASSAY OF LIPASE: CPT

## 2017-12-29 PROCEDURE — 99407 BEHAV CHNG SMOKING > 10 MIN: CPT | Performed by: HOSPITALIST

## 2017-12-29 PROCEDURE — 99285 EMERGENCY DEPT VISIT HI MDM: CPT

## 2017-12-29 PROCEDURE — 84703 CHORIONIC GONADOTROPIN ASSAY: CPT

## 2017-12-29 PROCEDURE — 96372 THER/PROPH/DIAG INJ SC/IM: CPT | Mod: XU

## 2017-12-29 PROCEDURE — 99220 PR INITIAL OBSERVATION CARE,LEVL III: CPT | Mod: 25 | Performed by: HOSPITALIST

## 2017-12-29 RX ORDER — ONDANSETRON 2 MG/ML
4 INJECTION INTRAMUSCULAR; INTRAVENOUS ONCE
Status: COMPLETED | OUTPATIENT
Start: 2017-12-29 | End: 2017-12-29

## 2017-12-29 RX ORDER — POTASSIUM CHLORIDE 20 MEQ/1
40 TABLET, EXTENDED RELEASE ORAL ONCE
Status: DISCONTINUED | OUTPATIENT
Start: 2017-12-29 | End: 2017-12-29

## 2017-12-29 RX ORDER — HYDROCHLOROTHIAZIDE 12.5 MG/1
12.5-25 TABLET ORAL DAILY
Status: DISCONTINUED | OUTPATIENT
Start: 2017-12-29 | End: 2017-12-29

## 2017-12-29 RX ORDER — OMEPRAZOLE 20 MG/1
40 CAPSULE, DELAYED RELEASE ORAL DAILY
Status: DISCONTINUED | OUTPATIENT
Start: 2017-12-29 | End: 2017-12-30 | Stop reason: HOSPADM

## 2017-12-29 RX ORDER — POLYETHYLENE GLYCOL 3350 17 G/17G
1 POWDER, FOR SOLUTION ORAL
Status: DISCONTINUED | OUTPATIENT
Start: 2017-12-29 | End: 2017-12-30 | Stop reason: HOSPADM

## 2017-12-29 RX ORDER — PROMETHAZINE HYDROCHLORIDE 25 MG/1
12.5-25 TABLET ORAL EVERY 4 HOURS PRN
Status: DISCONTINUED | OUTPATIENT
Start: 2017-12-29 | End: 2017-12-30 | Stop reason: HOSPADM

## 2017-12-29 RX ORDER — SODIUM CHLORIDE 9 MG/ML
1000 INJECTION, SOLUTION INTRAVENOUS ONCE
Status: COMPLETED | OUTPATIENT
Start: 2017-12-29 | End: 2017-12-29

## 2017-12-29 RX ORDER — OXYCODONE HYDROCHLORIDE 5 MG/1
5 TABLET ORAL
Status: DISCONTINUED | OUTPATIENT
Start: 2017-12-29 | End: 2017-12-30 | Stop reason: HOSPADM

## 2017-12-29 RX ORDER — HYDROMORPHONE HYDROCHLORIDE 2 MG/ML
1 INJECTION, SOLUTION INTRAMUSCULAR; INTRAVENOUS; SUBCUTANEOUS ONCE
Status: COMPLETED | OUTPATIENT
Start: 2017-12-29 | End: 2017-12-29

## 2017-12-29 RX ORDER — CIPROFLOXACIN 2 MG/ML
400 INJECTION, SOLUTION INTRAVENOUS ONCE
Status: COMPLETED | OUTPATIENT
Start: 2017-12-29 | End: 2017-12-29

## 2017-12-29 RX ORDER — ONDANSETRON 2 MG/ML
4 INJECTION INTRAMUSCULAR; INTRAVENOUS EVERY 4 HOURS PRN
Status: DISCONTINUED | OUTPATIENT
Start: 2017-12-29 | End: 2017-12-30 | Stop reason: HOSPADM

## 2017-12-29 RX ORDER — AMOXICILLIN 250 MG
2 CAPSULE ORAL 2 TIMES DAILY
Status: DISCONTINUED | OUTPATIENT
Start: 2017-12-29 | End: 2017-12-30 | Stop reason: HOSPADM

## 2017-12-29 RX ORDER — BACLOFEN 10 MG/1
10 TABLET ORAL 3 TIMES DAILY
Status: DISCONTINUED | OUTPATIENT
Start: 2017-12-29 | End: 2017-12-29

## 2017-12-29 RX ORDER — OXYCODONE HYDROCHLORIDE 10 MG/1
10 TABLET ORAL
Status: DISCONTINUED | OUTPATIENT
Start: 2017-12-29 | End: 2017-12-30 | Stop reason: HOSPADM

## 2017-12-29 RX ORDER — TRAZODONE HYDROCHLORIDE 100 MG/1
100 TABLET ORAL NIGHTLY
Status: DISCONTINUED | OUTPATIENT
Start: 2017-12-29 | End: 2017-12-30 | Stop reason: HOSPADM

## 2017-12-29 RX ORDER — RISPERIDONE 3 MG/1
3 TABLET ORAL DAILY
Status: DISCONTINUED | OUTPATIENT
Start: 2017-12-29 | End: 2017-12-30 | Stop reason: HOSPADM

## 2017-12-29 RX ORDER — CIPROFLOXACIN 2 MG/ML
400 INJECTION, SOLUTION INTRAVENOUS EVERY 12 HOURS
Status: DISCONTINUED | OUTPATIENT
Start: 2017-12-30 | End: 2017-12-30 | Stop reason: HOSPADM

## 2017-12-29 RX ORDER — PROMETHAZINE HYDROCHLORIDE 12.5 MG/1
12.5-25 SUPPOSITORY RECTAL EVERY 4 HOURS PRN
Status: DISCONTINUED | OUTPATIENT
Start: 2017-12-29 | End: 2017-12-30 | Stop reason: HOSPADM

## 2017-12-29 RX ORDER — DIAZEPAM 5 MG/1
5 TABLET ORAL EVERY 6 HOURS PRN
Status: DISCONTINUED | OUTPATIENT
Start: 2017-12-29 | End: 2017-12-29

## 2017-12-29 RX ORDER — HYDROMORPHONE HYDROCHLORIDE 2 MG/ML
0.5 INJECTION, SOLUTION INTRAMUSCULAR; INTRAVENOUS; SUBCUTANEOUS
Status: DISCONTINUED | OUTPATIENT
Start: 2017-12-29 | End: 2017-12-30 | Stop reason: HOSPADM

## 2017-12-29 RX ORDER — LEVOTHYROXINE SODIUM 0.03 MG/1
25 TABLET ORAL
Status: DISCONTINUED | OUTPATIENT
Start: 2017-12-30 | End: 2017-12-30 | Stop reason: HOSPADM

## 2017-12-29 RX ORDER — SODIUM CHLORIDE 9 MG/ML
INJECTION, SOLUTION INTRAVENOUS CONTINUOUS
Status: DISCONTINUED | OUTPATIENT
Start: 2017-12-29 | End: 2017-12-30 | Stop reason: HOSPADM

## 2017-12-29 RX ORDER — ONDANSETRON 4 MG/1
4 TABLET, ORALLY DISINTEGRATING ORAL EVERY 4 HOURS PRN
Status: DISCONTINUED | OUTPATIENT
Start: 2017-12-29 | End: 2017-12-30 | Stop reason: HOSPADM

## 2017-12-29 RX ORDER — POTASSIUM CHLORIDE 20 MEQ/1
40 TABLET, EXTENDED RELEASE ORAL ONCE
Status: COMPLETED | OUTPATIENT
Start: 2017-12-29 | End: 2017-12-29

## 2017-12-29 RX ORDER — BISACODYL 10 MG
10 SUPPOSITORY, RECTAL RECTAL
Status: DISCONTINUED | OUTPATIENT
Start: 2017-12-29 | End: 2017-12-30 | Stop reason: HOSPADM

## 2017-12-29 RX ORDER — HEPARIN SODIUM 5000 [USP'U]/ML
5000 INJECTION, SOLUTION INTRAVENOUS; SUBCUTANEOUS EVERY 8 HOURS
Status: DISCONTINUED | OUTPATIENT
Start: 2017-12-29 | End: 2017-12-30 | Stop reason: HOSPADM

## 2017-12-29 RX ADMIN — HYDROMORPHONE HYDROCHLORIDE 1 MG: 2 INJECTION INTRAMUSCULAR; INTRAVENOUS; SUBCUTANEOUS at 14:32

## 2017-12-29 RX ADMIN — TRAZODONE HYDROCHLORIDE 100 MG: 100 TABLET ORAL at 21:07

## 2017-12-29 RX ADMIN — NICOTINE POLACRILEX 2 MG: 2 GUM, CHEWING BUCCAL at 22:18

## 2017-12-29 RX ADMIN — POTASSIUM CHLORIDE 40 MEQ: 1500 TABLET, EXTENDED RELEASE ORAL at 18:11

## 2017-12-29 RX ADMIN — SODIUM CHLORIDE: 9 INJECTION, SOLUTION INTRAVENOUS at 18:08

## 2017-12-29 RX ADMIN — ONDANSETRON 4 MG: 2 INJECTION INTRAMUSCULAR; INTRAVENOUS at 12:57

## 2017-12-29 RX ADMIN — CIPROFLOXACIN 400 MG: 2 INJECTION, SOLUTION INTRAVENOUS at 18:07

## 2017-12-29 RX ADMIN — OMEPRAZOLE 40 MG: 20 CAPSULE, DELAYED RELEASE ORAL at 18:11

## 2017-12-29 RX ADMIN — IOHEXOL 100 ML: 350 INJECTION, SOLUTION INTRAVENOUS at 15:49

## 2017-12-29 RX ADMIN — ONDANSETRON 4 MG: 2 INJECTION INTRAMUSCULAR; INTRAVENOUS at 14:32

## 2017-12-29 RX ADMIN — PROMETHAZINE HYDROCHLORIDE 25 MG: 12.5 SUPPOSITORY RECTAL at 20:05

## 2017-12-29 RX ADMIN — OXYCODONE HYDROCHLORIDE 10 MG: 10 TABLET ORAL at 20:04

## 2017-12-29 RX ADMIN — HEPARIN SODIUM 5000 UNITS: 5000 INJECTION, SOLUTION INTRAVENOUS; SUBCUTANEOUS at 21:06

## 2017-12-29 RX ADMIN — SODIUM CHLORIDE 1000 ML: 9 INJECTION, SOLUTION INTRAVENOUS at 12:58

## 2017-12-29 RX ADMIN — RISPERIDONE 3 MG: 3 TABLET ORAL at 18:11

## 2017-12-29 ASSESSMENT — ENCOUNTER SYMPTOMS
DIZZINESS: 0
DIARRHEA: 0
MYALGIAS: 0
SPEECH CHANGE: 0
FEVER: 0
VOMITING: 1
ABDOMINAL PAIN: 1
NERVOUS/ANXIOUS: 0
EYE DISCHARGE: 0
CONSTIPATION: 0
NAUSEA: 1
COUGH: 0
HEADACHES: 0
DEPRESSION: 0
SHORTNESS OF BREATH: 0
DOUBLE VISION: 0
HEARTBURN: 0
SENSORY CHANGE: 0
WEAKNESS: 1
BLURRED VISION: 0
BRUISES/BLEEDS EASILY: 0
CHILLS: 0
PALPITATIONS: 0

## 2017-12-29 ASSESSMENT — LIFESTYLE VARIABLES
EVER_SMOKED: YES
ALCOHOL_USE: NO

## 2017-12-29 ASSESSMENT — PAIN SCALES - GENERAL
PAINLEVEL_OUTOF10: 6
PAINLEVEL_OUTOF10: 9
PAINLEVEL_OUTOF10: 5

## 2017-12-29 NOTE — ED PROVIDER NOTES
ED Provider Note    Scribed for Ervin Beyer M.D. by Tj Thompson. 12/29/2017  12:34 PM    Primary care provider: [facility]  Means of arrival: Walk-in  History obtained from: Patient  History limited by: None    CHIEF COMPLAINT  Chief Complaint   Patient presents with   • Abdominal Pain   • N/V       HPI  Kaylie Burciaga is a 32 y.o. female who presents to the Emergency Department for abdominal pains, nausea, and vomiting onset Over a week ago. The patient was seen 5 days ago and underwent evaluation including ultrasound and laboratory studies. These did not reveal the cause the patient's symptoms. She is given a prescription for multiple medications which she picked up 3-4 days ago and she has been taking. However she continues to have symptoms. She returns emergency department with epigastric abdominal discomfort associated with nausea and vomiting. He has not had any fevers. No diarrhea. Constipation.     REVIEW OF SYSTEMS  Pertinent positives include abdominal pain, nausea, vomiting.   Pertinent negatives include no diarrhea or fevers.    All other systems reviewed and negative.    C.     PAST MEDICAL HISTORY   has a past medical history of Anxiety and depression; ASTHMA; bipolar; Blood transfusion (2010); Depression; Hypothyroidism due to acquired atrophy of thyroid (5/31/2016); and Nausea & vomiting (1/15/2012).    SURGICAL HISTORY   has a past surgical history that includes gastroscopy with biopsy (11/29/08); other (T & A 5 yrs ago); other abdominal surgery; tonsillectomy; and marco by laparoscopy (12/1/08).    SOCIAL HISTORY  Social History   Substance Use Topics   • Smoking status: Current Every Day Smoker     Packs/day: 0.50     Years: 11.00     Types: Cigarettes   • Smokeless tobacco: Never Used      Comment:  Patient has cut down to less than half a pack a day.   • Alcohol use No      Comment: does not drink      History   Drug Use No     Comment: Last smoked 1 year ago.        FAMILY HISTORY  Family History   Problem Relation Age of Onset   • Diabetes Mother      Insulin   • Hypertension Mother    • Alcohol/Drug Mother        CURRENT MEDICATIONS    Current Facility-Administered Medications:   •  NS infusion 1,000 mL, 1,000 mL, Intravenous, Once, Ervin Beyer M.D.  •  ondansetron (ZOFRAN) syringe/vial injection 4 mg, 4 mg, Intravenous, Once, Ervin Beyer M.D.    Current Outpatient Prescriptions:   •  omeprazole (PRILOSEC) 40 MG delayed-release capsule, Take 1 Cap by mouth every day., Disp: 30 Cap, Rfl: 1  •  promethazine (PHENERGAN) 25 MG Suppos, Insert 1 Suppository in rectum every 6 hours as needed for Nausea/Vomiting., Disp: 10 Suppository, Rfl: 1  •  baclofen (LIORESAL) 10 MG Tab, Take 1 Tab by mouth 3 times a day., Disp: 30 Tab, Rfl: 2  •  promethazine (PHENERGAN) 25 MG Tab, Take 1 Tab by mouth every 8 hours as needed for Nausea/Vomiting., Disp: 30 Tab, Rfl: 0  •  ranitidine (ZANTAC) 150 MG Tab, Take 1 Tab by mouth 2 times a day., Disp: 60 Tab, Rfl: 11  •  albuterol 108 (90 Base) MCG/ACT Aero Soln inhalation aerosol, Inhale 2 Puffs by mouth every 6 hours as needed for Shortness of Breath., Disp: 8.5 g, Rfl: 5  •  albuterol (PROVENTIL) 2.5mg/3ml Nebu Soln solution for nebulization, 3 mL by Nebulization route every four hours as needed for Shortness of Breath., Disp: 75 mL, Rfl: 3  •  hydrochlorothiazide (HYDRODIURIL) 25 MG Tab, Take 0.5-1 Tabs by mouth every day., Disp: 20 Tab, Rfl: 0  •  levothyroxine (SYNTHROID) 25 MCG Tab, Take 1 Tab by mouth Every morning on an empty stomach., Disp: 30 Tab, Rfl: 5  •  hydrocodone-acetaminophen (NORCO) 5-325 MG Tab per tablet, Take 1-2 Tabs by mouth every four hours as needed., Disp: 20 Tab, Rfl: 0  •  MethylPREDNISolone (MEDROL DOSEPAK) 4 MG Tablet Therapy Pack, Use as directed, Disp: 1 Kit, Rfl: 0  •  diazepam (VALIUM) 5 MG Tab, Take 1 Tab by mouth every 6 hours as needed (back spasms)., Disp: 7 Tab, Rfl: 0  •  risperidone  "(RISPERDAL) 3 MG Tab, Take 3 mg by mouth every day., Disp: , Rfl:   •  trazodone (DESYREL) 100 MG Tab, Take 200 mg by mouth every evening., Disp: , Rfl:     ALLERGIES  Allergies   Allergen Reactions   • Penicillins Anaphylaxis   • Morphine Rash     Rash only       PHYSICAL EXAM  VITAL SIGNS: /84   Pulse 92   Temp 36.4 °C (97.5 °F) (Temporal)   Resp 14   Ht 1.626 m (5' 4\")   Wt 95.5 kg (210 lb 8.6 oz)   SpO2 93%   BMI 36.14 kg/m²     Nursing note and vitals reviewed.  Constitutional: Well-developed and well-nourished. No distress.   HENT: Head is normocephalic and atraumatic. Oropharynx is clear and moist without exudate or erythema.   Eyes: Pupils are equal, round, and reactive to light. Conjunctiva are normal.   Cardiovascular: Normal rate and regular rhythm. No murmur heard. Normal radial pulses.  Pulmonary/Chest: Breath sounds normal. No wheezes or rales.   Abdominal: Soft, right upper quadrant tenderness. No distention    Musculoskeletal: Extremities exhibit normal range of motion without edema or tenderness.   Neurological: Awake, alert and oriented to person, place, and time. No focal deficits noted.  Skin: Skin is warm and dry. No rash.   Psychiatric: Normal mood and affect. Appropriate for clinical situation    DIAGNOSTIC STUDIES / PROCEDURES    LABS  Results for orders placed or performed during the hospital encounter of 12/29/17   CBC WITH DIFFERENTIAL   Result Value Ref Range    WBC 9.3 4.8 - 10.8 K/uL    RBC 4.73 4.20 - 5.40 M/uL    Hemoglobin 12.2 12.0 - 16.0 g/dL    Hematocrit 38.0 37.0 - 47.0 %    MCV 80.3 (L) 81.4 - 97.8 fL    MCH 25.8 (L) 27.0 - 33.0 pg    MCHC 32.1 (L) 33.6 - 35.0 g/dL    RDW 47.8 35.9 - 50.0 fL    Platelet Count 350 164 - 446 K/uL    MPV 10.2 9.0 - 12.9 fL    Neutrophils-Polys 65.80 44.00 - 72.00 %    Lymphocytes 24.70 22.00 - 41.00 %    Monocytes 8.60 0.00 - 13.40 %    Eosinophils 0.20 0.00 - 6.90 %    Basophils 0.40 0.00 - 1.80 %    Immature Granulocytes 0.30 0.00 - " 0.90 %    Nucleated RBC 0.00 /100 WBC    Neutrophils (Absolute) 6.14 2.00 - 7.15 K/uL    Lymphs (Absolute) 2.31 1.00 - 4.80 K/uL    Monos (Absolute) 0.80 0.00 - 0.85 K/uL    Eos (Absolute) 0.02 0.00 - 0.51 K/uL    Baso (Absolute) 0.04 0.00 - 0.12 K/uL    Immature Granulocytes (abs) 0.03 0.00 - 0.11 K/uL    NRBC (Absolute) 0.00 K/uL   COMP METABOLIC PANEL   Result Value Ref Range    Sodium 136 135 - 145 mmol/L    Potassium 3.0 (L) 3.6 - 5.5 mmol/L    Chloride 99 96 - 112 mmol/L    Co2 27 20 - 33 mmol/L    Anion Gap 10.0 0.0 - 11.9    Glucose 101 (H) 65 - 99 mg/dL    Bun 7 (L) 8 - 22 mg/dL    Creatinine 0.68 0.50 - 1.40 mg/dL    Calcium 9.5 8.5 - 10.5 mg/dL    AST(SGOT) 14 12 - 45 U/L    ALT(SGPT) 23 2 - 50 U/L    Alkaline Phosphatase 56 30 - 99 U/L    Total Bilirubin 0.4 0.1 - 1.5 mg/dL    Albumin 4.1 3.2 - 4.9 g/dL    Total Protein 7.0 6.0 - 8.2 g/dL    Globulin 2.9 1.9 - 3.5 g/dL    A-G Ratio 1.4 g/dL   LIPASE   Result Value Ref Range    Lipase <3 (L) 11 - 82 U/L   URINALYSIS CULTURE, IF INDICATED   Result Value Ref Range    Color DK Yellow     Character Cloudy (A)     Specific Gravity 1.029 <1.035    Ph 5.5 5.0 - 8.0    Glucose Negative Negative mg/dL    Ketones Trace (A) Negative mg/dL    Protein Negative Negative mg/dL    Bilirubin Moderate (A) Negative    Urobilinogen, Urine 1.0 Negative    Nitrite Negative Negative    Leukocyte Esterase Small (A) Negative    Occult Blood Negative Negative    Micro Urine Req Microscopic     Culture Indicated Yes UA Culture   HCG QUAL SERUM   Result Value Ref Range    Beta-Hcg Qualitative Serum Negative Negative   ESTIMATED GFR   Result Value Ref Range    GFR If African American >60 >60 mL/min/1.73 m 2    GFR If Non African American >60 >60 mL/min/1.73 m 2   URINE MICROSCOPIC (W/UA)   Result Value Ref Range    WBC 10-20 (A) /hpf    RBC 2-5 (A) /hpf    Bacteria Moderate (A) None /hpf    Epithelial Cells Many (A) /hpf    Epithelial Cells Renal Few /hpf    Mucous Threads Few /hpf     Hyaline Cast 3-5 (A) /lpf   All labs reviewed by me.    RADIOLOGY  CT-ABDOMEN-PELVIS WITH   Final Result      No evidence of bowel obstruction or acute appendicitis. No free fluid.   No hydronephrosis.   Surgical absence gallbladder. Mild dilatation of the common duct similar to previous findings.        The radiologist's interpretation of all radiological studies have been reviewed by me.    COURSE & MEDICAL DECISION MAKING  Nursing notes, VS, PMSFHx reviewed in chart.     Review of past medical records shows the patient was seen here 5 days ago and underwent an ultrasound which was unrevealing. Laboratory studies were nonspecific    12:34 PM - Patient seen and examined at bedside. Patient will be treated with Zofran and normal saline 1L infusion for vomiting. Ordered estimated GFR, CBC, CMP, lipase, urinalysis culture, and HCG qual serum to evaluate her symptoms. The differential diagnoses include but are not limited to: Urinary tract infection, vallecula and abnormality, pancreatitis, gastritis, viral syndrome    2:01 PM Reviewed the patient's CMP result, which shows low potassium levels of 3.0. He will receive Kdur 40 mEq.     2:31 PM Reviewed the patient's lab results, as shown above. Ordered CT-abdomen-pelvis contrast. The patient will be medicated with Dilaudid 1mg, and another dose of Zofran for pain relief.       4:00 PM the patient presents today with abdominal pain, and nausea and vomiting. The patient has had multiple bouts of emesis in the emergency department despite treatment with anti-medics. Evaluation is remarkable for findings consistent with urinary tract infection. Patient is treated with cipro in the emergency department. I have ordered a CT scan to further evaluate the patient's symptoms although she does not seem to have a surgical abdomen given the persistence of her symptoms and feel that a CT scan would be beneficial.      4:01 PM CT scan results as above.    FINAL IMPRESSION  1. Nausea  and vomiting, intractability of vomiting not specified, unspecified vomiting type    2. Acute cystitis without hematuria    3. Intractable vomiting with nausea, unspecified vomiting type          I, Tj Thompson (Scribe), am scribing for, and in the presence of, Ervin Beyer M.D..    Electronically signed by: Tj Thompson ([place]), 12/29/2017    IErvin M.D. personally performed the services described in this documentation, as scribed by Tj Thompson in my presence, and it is both accurate and complete.    The note accurately reflects work and decisions made by me.  Ervin Beyer  12/29/2017  4:01 PM

## 2017-12-29 NOTE — ED NOTES
Pt states she is unable to drink and keep down the contrast, Dr. Beyer aware, he is ok with IV contrast

## 2017-12-29 NOTE — ED NOTES
Pt to triage, c/o vomiting/ abd pain , was seen for same [place] , had a delay getting prescriptions filled,

## 2017-12-30 VITALS
BODY MASS INDEX: 35.94 KG/M2 | HEIGHT: 64 IN | WEIGHT: 210.54 LBS | TEMPERATURE: 98 F | SYSTOLIC BLOOD PRESSURE: 130 MMHG | HEART RATE: 68 BPM | DIASTOLIC BLOOD PRESSURE: 60 MMHG | OXYGEN SATURATION: 98 % | RESPIRATION RATE: 18 BRPM

## 2017-12-30 LAB
ALBUMIN SERPL BCP-MCNC: 3.7 G/DL (ref 3.2–4.9)
ALBUMIN/GLOB SERPL: 1.5 G/DL
ALP SERPL-CCNC: 42 U/L (ref 30–99)
ALT SERPL-CCNC: 18 U/L (ref 2–50)
ANION GAP SERPL CALC-SCNC: 8 MMOL/L (ref 0–11.9)
AST SERPL-CCNC: 15 U/L (ref 12–45)
BASOPHILS # BLD AUTO: 0.5 % (ref 0–1.8)
BASOPHILS # BLD: 0.04 K/UL (ref 0–0.12)
BILIRUB SERPL-MCNC: 0.4 MG/DL (ref 0.1–1.5)
BUN SERPL-MCNC: 4 MG/DL (ref 8–22)
CALCIUM SERPL-MCNC: 8.9 MG/DL (ref 8.5–10.5)
CHLORIDE SERPL-SCNC: 105 MMOL/L (ref 96–112)
CHOLEST SERPL-MCNC: 154 MG/DL (ref 100–199)
CO2 SERPL-SCNC: 24 MMOL/L (ref 20–33)
CREAT SERPL-MCNC: 0.78 MG/DL (ref 0.5–1.4)
EOSINOPHIL # BLD AUTO: 0.09 K/UL (ref 0–0.51)
EOSINOPHIL NFR BLD: 1.1 % (ref 0–6.9)
ERYTHROCYTE [DISTWIDTH] IN BLOOD BY AUTOMATED COUNT: 50.3 FL (ref 35.9–50)
GFR SERPL CREATININE-BSD FRML MDRD: >60 ML/MIN/1.73 M 2
GLOBULIN SER CALC-MCNC: 2.5 G/DL (ref 1.9–3.5)
GLUCOSE SERPL-MCNC: 97 MG/DL (ref 65–99)
HCT VFR BLD AUTO: 36.4 % (ref 37–47)
HDLC SERPL-MCNC: 31 MG/DL
HGB BLD-MCNC: 11.2 G/DL (ref 12–16)
IMM GRANULOCYTES # BLD AUTO: 0.03 K/UL (ref 0–0.11)
IMM GRANULOCYTES NFR BLD AUTO: 0.4 % (ref 0–0.9)
LDLC SERPL CALC-MCNC: 91 MG/DL
LYMPHOCYTES # BLD AUTO: 2.93 K/UL (ref 1–4.8)
LYMPHOCYTES NFR BLD: 35.7 % (ref 22–41)
MCH RBC QN AUTO: 25.4 PG (ref 27–33)
MCHC RBC AUTO-ENTMCNC: 30.8 G/DL (ref 33.6–35)
MCV RBC AUTO: 82.5 FL (ref 81.4–97.8)
MONOCYTES # BLD AUTO: 0.78 K/UL (ref 0–0.85)
MONOCYTES NFR BLD AUTO: 9.5 % (ref 0–13.4)
NEUTROPHILS # BLD AUTO: 4.33 K/UL (ref 2–7.15)
NEUTROPHILS NFR BLD: 52.8 % (ref 44–72)
NRBC # BLD AUTO: 0 K/UL
NRBC BLD-RTO: 0 /100 WBC
PLATELET # BLD AUTO: 322 K/UL (ref 164–446)
PMV BLD AUTO: 10.1 FL (ref 9–12.9)
POTASSIUM SERPL-SCNC: 3.2 MMOL/L (ref 3.6–5.5)
PROT SERPL-MCNC: 6.2 G/DL (ref 6–8.2)
RBC # BLD AUTO: 4.41 M/UL (ref 4.2–5.4)
SODIUM SERPL-SCNC: 137 MMOL/L (ref 135–145)
TRIGL SERPL-MCNC: 161 MG/DL (ref 0–149)
WBC # BLD AUTO: 8.2 K/UL (ref 4.8–10.8)

## 2017-12-30 PROCEDURE — 96376 TX/PRO/DX INJ SAME DRUG ADON: CPT

## 2017-12-30 PROCEDURE — G0378 HOSPITAL OBSERVATION PER HR: HCPCS

## 2017-12-30 PROCEDURE — 700102 HCHG RX REV CODE 250 W/ 637 OVERRIDE(OP): Performed by: NURSE PRACTITIONER

## 2017-12-30 PROCEDURE — 96375 TX/PRO/DX INJ NEW DRUG ADDON: CPT

## 2017-12-30 PROCEDURE — A9270 NON-COVERED ITEM OR SERVICE: HCPCS | Performed by: HOSPITALIST

## 2017-12-30 PROCEDURE — A9270 NON-COVERED ITEM OR SERVICE: HCPCS | Performed by: NURSE PRACTITIONER

## 2017-12-30 PROCEDURE — 700102 HCHG RX REV CODE 250 W/ 637 OVERRIDE(OP): Performed by: HOSPITALIST

## 2017-12-30 PROCEDURE — 80061 LIPID PANEL: CPT

## 2017-12-30 PROCEDURE — 80053 COMPREHEN METABOLIC PANEL: CPT

## 2017-12-30 PROCEDURE — 99217 PR OBSERVATION CARE DISCHARGE: CPT | Performed by: HOSPITALIST

## 2017-12-30 PROCEDURE — 96366 THER/PROPH/DIAG IV INF ADDON: CPT

## 2017-12-30 PROCEDURE — 85025 COMPLETE CBC W/AUTO DIFF WBC: CPT

## 2017-12-30 PROCEDURE — 36415 COLL VENOUS BLD VENIPUNCTURE: CPT

## 2017-12-30 PROCEDURE — 700105 HCHG RX REV CODE 258: Performed by: HOSPITALIST

## 2017-12-30 PROCEDURE — 700111 HCHG RX REV CODE 636 W/ 250 OVERRIDE (IP): Performed by: HOSPITALIST

## 2017-12-30 RX ORDER — PROMETHAZINE HYDROCHLORIDE 12.5 MG/1
12.5-25 SUPPOSITORY RECTAL EVERY 4 HOURS PRN
Qty: 10 SUPPOSITORY | Refills: 0 | Status: SHIPPED | OUTPATIENT
Start: 2017-12-30 | End: 2018-04-10 | Stop reason: SDUPTHER

## 2017-12-30 RX ORDER — CIPROFLOXACIN 250 MG/1
250 TABLET, FILM COATED ORAL 2 TIMES DAILY
Qty: 10 TAB | Refills: 0 | Status: SHIPPED | OUTPATIENT
Start: 2017-12-30 | End: 2017-12-30

## 2017-12-30 RX ORDER — ONDANSETRON 4 MG/1
4 TABLET, ORALLY DISINTEGRATING ORAL EVERY 4 HOURS PRN
Qty: 10 TAB | Refills: 0 | Status: SHIPPED | OUTPATIENT
Start: 2017-12-30 | End: 2018-04-10

## 2017-12-30 RX ORDER — CIPROFLOXACIN 250 MG/1
250 TABLET, FILM COATED ORAL 2 TIMES DAILY
Qty: 10 TAB | Refills: 0 | Status: SHIPPED | OUTPATIENT
Start: 2017-12-30 | End: 2018-01-04

## 2017-12-30 RX ORDER — POTASSIUM CHLORIDE 20 MEQ/1
40 TABLET, EXTENDED RELEASE ORAL ONCE
Status: COMPLETED | OUTPATIENT
Start: 2017-12-30 | End: 2017-12-30

## 2017-12-30 RX ADMIN — CIPROFLOXACIN 400 MG: 2 INJECTION, SOLUTION INTRAVENOUS at 08:49

## 2017-12-30 RX ADMIN — OMEPRAZOLE 40 MG: 20 CAPSULE, DELAYED RELEASE ORAL at 08:49

## 2017-12-30 RX ADMIN — PROCHLORPERAZINE EDISYLATE 10 MG: 5 INJECTION INTRAMUSCULAR; INTRAVENOUS at 08:51

## 2017-12-30 RX ADMIN — OXYCODONE HYDROCHLORIDE 10 MG: 10 TABLET ORAL at 01:32

## 2017-12-30 RX ADMIN — PROMETHAZINE HYDROCHLORIDE 25 MG: 12.5 SUPPOSITORY RECTAL at 04:57

## 2017-12-30 RX ADMIN — POTASSIUM CHLORIDE 40 MEQ: 1500 TABLET, EXTENDED RELEASE ORAL at 08:50

## 2017-12-30 RX ADMIN — OXYCODONE HYDROCHLORIDE 10 MG: 10 TABLET ORAL at 08:50

## 2017-12-30 RX ADMIN — SODIUM CHLORIDE: 9 INJECTION, SOLUTION INTRAVENOUS at 04:57

## 2017-12-30 RX ADMIN — HYDROMORPHONE HYDROCHLORIDE 0.5 MG: 2 INJECTION INTRAMUSCULAR; INTRAVENOUS; SUBCUTANEOUS at 04:54

## 2017-12-30 RX ADMIN — PROCHLORPERAZINE EDISYLATE 10 MG: 5 INJECTION INTRAMUSCULAR; INTRAVENOUS at 01:33

## 2017-12-30 RX ADMIN — LEVOTHYROXINE SODIUM 25 MCG: 25 TABLET ORAL at 06:17

## 2017-12-30 ASSESSMENT — PAIN SCALES - GENERAL
PAINLEVEL_OUTOF10: 6
PAINLEVEL_OUTOF10: 3

## 2017-12-30 ASSESSMENT — LIFESTYLE VARIABLES: EVER_SMOKED: YES

## 2017-12-30 NOTE — PROGRESS NOTES
A/o,assessment completed per CDU,poc discussed,verbalized understanding,pt still c/o nausea, requesting for phenergan supp,rates right abd'l pain 5/10,will administer prn meds,call button within reach,will continue to monitor.

## 2017-12-30 NOTE — ASSESSMENT & PLAN NOTE
I believe this is 2/2 to PUD vs cyclic vomiting syndrome vs UTI   Treat uti  Will control symptomatically with PPI GI cocktail  Anti emetics, IVF, pain control   If no improvement consider GI consult in am  Advance diet as tolerated

## 2017-12-30 NOTE — PROGRESS NOTES
Received pt from ED.  Pt is A/Ox4.  Respirations are even and unlabored at this time.  Pt states abdominal pain is tolerable at this time. Pt states continued nausea, will medicated per orders.  POC reviewed, verbalized understanding. Call light within reach.

## 2017-12-30 NOTE — PROGRESS NOTES
Nausea better,broth given, Dr. Whittaker notified regarding pt's request for nicotine gum,orders received,pt updated,will continue to monitor.

## 2017-12-30 NOTE — DISCHARGE INSTRUCTIONS
Discharge Instructions    Discharged to home by car with relative. Discharged via wheelchair, hospital escort: Yes.  Special equipment needed: Not Applicable    Be sure to schedule a follow-up appointment with your primary care doctor or any specialists as instructed.     Discharge Plan:   Diet Plan: Discussed  Activity Level: Discussed  Smoking Cessation Offered: Patient Counseled  Confirmed Follow up Appointment: Patient to Call and Schedule Appointment  Confirmed Symptoms Management: Discussed  Medication Reconciliation Updated: Yes  Influenza Vaccine Indication: Indicated: 9 to 64 years of age    I understand that a diet low in cholesterol, fat, and sodium is recommended for good health. Unless I have been given specific instructions below for another diet, I accept this instruction as my diet prescription.   Other diet: Clear liquid, advance as tolerated.    Special Instructions: None    · Is patient discharged on Warfarin / Coumadin?   No     · Is patient Post Blood Transfusion?  No    Depression / Suicide Risk    As you are discharged from this [facility] facility, it is important to learn how to keep safe from harming yourself.    Recognize the warning signs:  · Abrupt changes in personality, positive or negative- including increase in energy   · Giving away possessions  · Change in eating patterns- significant weight changes-  positive or negative  · Change in sleeping patterns- unable to sleep or sleeping all the time   · Unwillingness or inability to communicate  · Depression  · Unusual sadness, discouragement and loneliness  · Talk of wanting to die  · Neglect of personal appearance   · Rebelliousness- reckless behavior  · Withdrawal from people/activities they love  · Confusion- inability to concentrate     If you or a loved one observes any of these behaviors or has concerns about self-harm, here's what you can do:  · Talk about it- your feelings and reasons for harming yourself  · Remove any means  that you might use to hurt yourself (examples: pills, rope, extension cords, firearm)  · Get professional help from the community (Mental Health, Substance Abuse, psychological counseling)  · Do not be alone:Call your Safe Contact- someone whom you trust who will be there for you.  · Call your local CRISIS HOTLINE 014-2872 or 182-371-1835  · Call your local Children's Mobile Crisis Response Team [place] (304) 887-1557 or www.[employer]  · Call the toll free National Suicide Prevention Hotlines   · National Suicide Prevention Lifeline 259-476-URZJ (1103)  · National Hope Line Network 800-SUICIDE (802-7562)

## 2017-12-30 NOTE — H&P
Hospital Medicine History and Physical    Date of Service  12/29/2017    Chief Complaint  Chief Complaint   Patient presents with   • Abdominal Pain   • N/V       History of Presenting Illness  32 y.o. female who presented 12/29/2017 with Abdominal pain and nausea vomiting  patient had nausea and vomiting for over a week. Patient seen 5 days ago underwent evaluation including ultrasound laboratory studies to look cause of the patient's symptoms. She is been having epigastric and suprapubic abdominal pain. She has these bouts a few times a year. This is associated with intractable nausea and vomiting. No diarrhea no constipation. She is also been using marijuana. She also smokes cigarettes.    Primary Care Physician  Dameon Parker ADenisPSELENE.    Consultants  None    Code Status  Full    Review of Systems  Review of Systems   Constitutional: Negative for chills and fever.   HENT: Negative for congestion and hearing loss.    Eyes: Negative for blurred vision, double vision and discharge.   Respiratory: Negative for cough and shortness of breath.    Cardiovascular: Negative for chest pain and palpitations.   Gastrointestinal: Positive for abdominal pain, nausea and vomiting. Negative for constipation, diarrhea and heartburn.   Genitourinary: Negative for dysuria and frequency.   Musculoskeletal: Negative for joint pain and myalgias.   Neurological: Positive for weakness. Negative for dizziness, sensory change, speech change and headaches.   Endo/Heme/Allergies: Does not bruise/bleed easily.   Psychiatric/Behavioral: Negative for depression. The patient is not nervous/anxious.         Past Medical History  Past Medical History:   Diagnosis Date   • Hypothyroidism due to acquired atrophy of thyroid 5/31/2016   • Nausea & vomiting 1/15/2012   • Blood transfusion 2010    Gallbladder removed and had something punctured   • Anxiety and depression    • ASTHMA     since birth, used albuterol as needed   • bipolar    •  Depression     at age 11       Surgical History  Past Surgical History:   Procedure Laterality Date   • STEFAN BY LAPAROSCOPY  12/1/08    Performed by BELLA ROQUE at SURGERY [facility]   • GASTROSCOPY WITH BIOPSY  11/29/08    Performed by GONZALES NAYAK at ENDOSCOPY [facility]   • OTHER  T & A 5 yrs ago   • OTHER ABDOMINAL SURGERY     • TONSILLECTOMY      Age at 17       Medications  No current facility-administered medications on file prior to encounter.      Current Outpatient Prescriptions on File Prior to Encounter   Medication Sig Dispense Refill   • omeprazole (PRILOSEC) 40 MG delayed-release capsule Take 1 Cap by mouth every day. 30 Cap 1   • promethazine (PHENERGAN) 25 MG Suppos Insert 1 Suppository in rectum every 6 hours as needed for Nausea/Vomiting. 10 Suppository 1   • baclofen (LIORESAL) 10 MG Tab Take 1 Tab by mouth 3 times a day. 30 Tab 2   • promethazine (PHENERGAN) 25 MG Tab Take 1 Tab by mouth every 8 hours as needed for Nausea/Vomiting. 30 Tab 0   • ranitidine (ZANTAC) 150 MG Tab Take 1 Tab by mouth 2 times a day. 60 Tab 11   • albuterol 108 (90 Base) MCG/ACT Aero Soln inhalation aerosol Inhale 2 Puffs by mouth every 6 hours as needed for Shortness of Breath. 8.5 g 5   • albuterol (PROVENTIL) 2.5mg/3ml Nebu Soln solution for nebulization 3 mL by Nebulization route every four hours as needed for Shortness of Breath. 75 mL 3   • hydrochlorothiazide (HYDRODIURIL) 25 MG Tab Take 0.5-1 Tabs by mouth every day. 20 Tab 0   • levothyroxine (SYNTHROID) 25 MCG Tab Take 1 Tab by mouth Every morning on an empty stomach. 30 Tab 5   • hydrocodone-acetaminophen (NORCO) 5-325 MG Tab per tablet Take 1-2 Tabs by mouth every four hours as needed. 20 Tab 0   • MethylPREDNISolone (MEDROL DOSEPAK) 4 MG Tablet Therapy Pack Use as directed 1 Kit 0   • diazepam (VALIUM) 5 MG Tab Take 1 Tab by mouth every 6 hours as needed (back spasms). 7 Tab 0   • risperidone (RISPERDAL) 3 MG Tab Take 3 mg by  mouth every day.     • trazodone (DESYREL) 100 MG Tab Take 200 mg by mouth every evening.         Family History  Family History   Problem Relation Age of Onset   • Diabetes Mother      Insulin   • Hypertension Mother    • Alcohol/Drug Mother        Social History  Social History   Substance Use Topics   • Smoking status: Current Every Day Smoker     Packs/day: 0.50     Years: 11.00     Types: Cigarettes   • Smokeless tobacco: Never Used      Comment:  Patient has cut down to less than half a pack a day.   • Alcohol use No      Comment: does not drink       Allergies  Allergies   Allergen Reactions   • Penicillins Anaphylaxis   • Morphine Rash     Rash only        Physical Exam  Laboratory   Hemodynamics  Temp (24hrs), Av.4 °C (97.5 °F), Min:36.4 °C (97.5 °F), Max:36.4 °C (97.5 °F)   Temperature: 36.4 °C (97.5 °F)  Pulse  Av  Min: 76  Max: 92    Blood Pressure: 125/84, NIBP: 127/64      Respiratory      Respiration: 14, Pulse Oximetry: 96 %             Physical Exam   Constitutional: She is oriented to person, place, and time. She appears well-developed and well-nourished. She appears distressed.   HENT:   Head: Normocephalic and atraumatic.   Eyes: Conjunctivae and EOM are normal. Pupils are equal, round, and reactive to light.   Neck: Normal range of motion. Neck supple. No JVD present.   Cardiovascular: Normal rate, regular rhythm, normal heart sounds and intact distal pulses.    No murmur heard.  Pulmonary/Chest: Effort normal and breath sounds normal. No respiratory distress. She has no wheezes.   Abdominal: Soft. Bowel sounds are normal. There is tenderness.   Suprapubic tenderness   Musculoskeletal: Normal range of motion. She exhibits no edema.   Neurological: She is alert and oriented to person, place, and time. No cranial nerve deficit.   Skin: Skin is warm and dry. No erythema.   Psychiatric: She has a normal mood and affect. Her behavior is normal. Judgment and thought content normal.   Nursing  note and vitals reviewed.      Recent Labs      12/29/17   1255   WBC  9.3   RBC  4.73   HEMOGLOBIN  12.2   HEMATOCRIT  38.0   MCV  80.3*   MCH  25.8*   MCHC  32.1*   RDW  47.8   PLATELETCT  350   MPV  10.2     Recent Labs      12/29/17   1255   SODIUM  136   POTASSIUM  3.0*   CHLORIDE  99   CO2  27   GLUCOSE  101*   BUN  7*   CREATININE  0.68   CALCIUM  9.5     Recent Labs      12/29/17   1255   ALTSGPT  23   ASTSGOT  14   ALKPHOSPHAT  56   TBILIRUBIN  0.4   LIPASE  <3*   GLUCOSE  101*                 No results found for: TROPONINI  Urinalysis:    Lab Results  Component Value Date/Time   SPECGRAVITY 1.029 12/29/2017 1358   GLUCOSEUR Negative 12/29/2017 1358   KETONES Trace (A) 12/29/2017 1358   NITRITE Negative 12/29/2017 1358   WBCURINE 10-20 (A) 12/29/2017 1358   RBCURINE 2-5 (A) 12/29/2017 1358   BACTERIA Moderate (A) 12/29/2017 1358   EPITHELCELL Many (A) 12/29/2017 1358        Imaging  CT-ABDOMEN-PELVIS WITH [[id number]] Resulted: 12/29/17 1557   Order Status: Completed Updated: 12/29/17 1559   Narrative:       12/29/2017 3:34 PM    HISTORY/REASON FOR EXAM:  Pain.  Pain and vomiting    TECHNIQUE/EXAM DESCRIPTION:   CT scan of the abdomen and pelvis with contrast.    Contrast-enhanced helical scanning was obtained from the diaphragmatic domes through the pubic symphysis following the bolus administration of nonionic contrast without complication.    100 mL of Omnipaque 350 nonionic contrast was administered without complication.    Low dose optimization technique was utilized for this CT exam including automated exposure control and adjustment of the mA and/or kV according to patient size.    COMPARISON: /22/14    FINDINGS:  CT Abdomen:  Atelectasis within the medial bases of the right middle lobe and lingula. No basilar pleural effusion.    No focal masses within the liver, spleen, pancreas, adrenal glands, or kidneys.  No hydronephrosis.  The gallbladder surgically absent. The common bile duct measures 8.2 mm  in diameter.    Contrast is located within the stomach, small bowel, and colon. No evidence of bowel obstruction or acute appendicitis.  The aorta is opacified and patent.  No retroperitoneal adenopathy.      CT Pelvis:  Urinary bladder is minimally distended.  Uterus and adnexal structures are not well delineated. There appear to be calcifications within the left ovary.  No pelvic free fluid.   Impression:       No evidence of bowel obstruction or acute appendicitis. No free fluid.  No hydronephrosis.  Surgical absence gallbladder. Mild dilatation of the common duct similar to previous findings.        Assessment/Plan     I anticipate this patient is appropriate for observation status at this time.    * Intractable nausea and vomiting   Assessment & Plan    I believe this is 2/2 to PUD vs cyclic vomiting syndrome vs UTI   Treat uti  Will control symptomatically with PPI GI cocktail  Anti emetics, IVF, pain control   If no improvement consider GI consult in am  Advance diet as tolerated        Chronic back pain   Assessment & Plan    W/ spasms   Cont baclofen and valium as needed patients home meds        UTI (urinary tract infection)   Assessment & Plan    Start on ceftriaxone follow culture        Morbid obesity with BMI of 40.0-44.9, adult (HCC)- (present on admission)   Assessment & Plan    Encourage weight loss        Hypothyroidism due to acquired atrophy of thyroid- (present on admission)   Assessment & Plan    Cont levo        Tobacco use- (present on admission)   Assessment & Plan    Counseled on smoking cessation greater than 10 minutes   Patient refused nicotine replacement            VTE prophylaxis: heparin.

## 2017-12-30 NOTE — DISCHARGE SUMMARY
CHIEF COMPLAINT ON ADMISSION:  Abdominal pain, nausea and vomiting.    HISTORY OF PRESENT ILLNESS AND HOSPITAL COURSE:  This is a 32-year-old female   who presented on 12/29/2017 with abdominal pain, nausea and vomiting and has   been complaining of these symptoms for approximately 1 week.  The patient was   also recently seen in the hospital 5 days prior for similar symptoms with a   negative workup.  The patient does report marijuana use.  The patient also   reports having similar episodes of this throughout the year.  On admission, CT   of the abdomen was negative for any acute intra-abdominal abnormalities.  The   patient was found to have urinary tract infection on admission and was   started on antibiotics.  The patient received IV fluids and was monitored   overnight in the hospital.  The patient's symptoms did improve overnight and   the patient was able to tolerate an oral diet.  The patient's symptoms are   likely associated with her UTI with some influence of her continuous marijuana   use at home.  The patient was educated on cessation of marijuana use and   verbalized understanding of this.  The patient at this time is stable and will   be discharged home in good stable condition.    DISCHARGE PROBLEM LIST:  1.  Tobacco abuse.  2.  Hypothyroidism due to acquired atrophy of thyroid.  3.  Morbid obesity.  4.  Intractable nausea and vomiting, resolved.  5.  Urinary tract infection.  6.  Chronic back pain.  7.  Hypokalemia.    DISCHARGE FOLLOWUP:  The patient will follow up with her primary care   physician in 1-2 weeks after discharge.    DISCHARGE MEDICATIONS:  1.  Albuterol 2.5 mg nebulizer solution, inhale 3 mL nebulized inhalation   route every 4 hours as needed for shortness of breath.  2.  Albuterol 108 mcg aerosol solution, inhale 2 puffs by mouth every 6 hours   as needed for shortness of breath.  3.  Cipro 250 mg tab, take 1 tab by mouth 3 times a day for 5 days.  4.  Synthroid 25 mcg tab, take 1  tab by mouth every morning on empty stomach.  5.  Prilosec 40 mg delayed release capsule, take 1 cap by mouth every day.  6.  Zofran 4 mg tab, take 1 tab by mouth every 4 hours as needed for nausea.  7.  Phenergan 12.5 mg suppository, insert 1-2 suppositories in the rectum   every 4 hours as needed for nausea and vomiting.  8.  Risperdal 3 mg tab, take 3 mg by mouth every day.  9.  Desyrel 100 mg tab, take 100 mg by mouth every evening.    DIET:  Regular diet.    ACTIVITY:  As tolerated.    LABORATORY DATA:  Sodium 137, potassium 3.2, chloride 105, CO2 of 24, glucose   97, BUN 4, creatinine 0.78.  White blood cells 8.2, hemoglobin 11.2,   hematocrit 36.4, platelet count 322.    Total time on the discharge process was approximately 35 minutes.       ____________________________________     PIERRE Saucedo    CSF / NTS    DD:  12/30/2017 10:51:33  DT:  12/30/2017 11:07:29    D#:  [id number]  Job#:  [id number]

## 2017-12-30 NOTE — PROGRESS NOTES
Discharge instructions, medications and follow-up reviewed with pt, pt verbalized understanding and denies questions. Discharge paperwork given to pt. PIV removed, armband removed. Pt walk to ER [facility] with hospital escort.

## 2017-12-31 ENCOUNTER — PATIENT OUTREACH (OUTPATIENT)
Dept: HEALTH INFORMATION MANAGEMENT | Facility: OTHER | Age: 32
End: 2017-12-31

## 2017-12-31 LAB
BACTERIA UR CULT: NORMAL
SIGNIFICANT IND 70042: NORMAL
SITE SITE: NORMAL
SOURCE SOURCE: NORMAL

## 2017-12-31 NOTE — PROGRESS NOTES
Placed discharge outreach phone call to patient s/p hospital discharge 12/30/17.  Received recording stating that pt has not set up voicemail.  No answer, no option to leave voicemail.  Discharge outreach letter mailed to patient.

## 2017-12-31 NOTE — LETTER
Kaylie Burciaga  [address]  [place] [address]    December 31, 2017      Dear Kaylie Burciaga,    [facility] wants to ensure your discharge home is safe and you or your loved ones have had all of your questions answered regarding your care after you leave the hospital.    Our discharge team was unsuccessful in our attempts to contact you telephonically and we wanted to be sure that you had a list of resources and contact information should you have any questions regarding your hospital stay, follow-up instructions, or active medical symptoms.    Questions or Concerns Regarding… Contact   Medical Questions Related to Your Discharge  (7 days a week, 8am-5pm) Contact a Nurse Care Coordinator   [phone]   Medical Questions Not Related to Your Discharge  (24 hours a day / 7 days a week)  Contact the Nurse Health Line   [phone]    Medications or Discharge Instructions Refer to your discharge packet   or contact your -[phone]   Follow-up Appointment(s) Schedule your appointment via [employer]   or contact Scheduling [phone]   Billing Review your statement via [employer]  or contact Billing [phone]   Medical Records Review your records via [employer]   or contact Medical Records [phone]     You can also easily access your medical information, test results and upcoming appointments via the [employer] free online health management tool. You can learn more and sign up at [email or web address]. For assistance setting up your [employer] account, please call [phone].    Once again, we want to ensure your discharge home is safe and that you have a clear understanding of any next steps in your care. If you have any questions or concerns, please do not hesitate to contact us, we are here for you. Thank you for choosing [facility] for your healthcare needs.    Sincerely,      Your [facility] Healthcare Team

## 2018-03-27 ENCOUNTER — OFFICE VISIT (OUTPATIENT)
Dept: MEDICAL GROUP | Facility: MEDICAL CENTER | Age: 33
End: 2018-03-27
Attending: NURSE PRACTITIONER
Payer: MEDICAID

## 2018-03-27 VITALS
SYSTOLIC BLOOD PRESSURE: 120 MMHG | DIASTOLIC BLOOD PRESSURE: 70 MMHG | HEART RATE: 88 BPM | BODY MASS INDEX: 31.65 KG/M2 | HEIGHT: 65 IN | WEIGHT: 190 LBS | OXYGEN SATURATION: 95 % | TEMPERATURE: 98.2 F | RESPIRATION RATE: 14 BRPM

## 2018-03-27 DIAGNOSIS — F41.9 ANXIETY AND DEPRESSION: ICD-10-CM

## 2018-03-27 DIAGNOSIS — F32.A ANXIETY AND DEPRESSION: ICD-10-CM

## 2018-03-27 DIAGNOSIS — F99 PSYCHIATRIC DISORDER: ICD-10-CM

## 2018-03-27 DIAGNOSIS — R19.04 LEFT LOWER QUADRANT ABDOMINAL MASS: ICD-10-CM

## 2018-03-27 DIAGNOSIS — K45.8 OTHER SPECIFIED ABDOMINAL HERNIA WITHOUT OBSTRUCTION OR GANGRENE: ICD-10-CM

## 2018-03-27 PROBLEM — K46.9 ABDOMINAL HERNIA: Status: ACTIVE | Noted: 2018-03-27

## 2018-03-27 PROCEDURE — 99214 OFFICE O/P EST MOD 30 MIN: CPT | Performed by: NURSE PRACTITIONER

## 2018-03-27 PROCEDURE — 99212 OFFICE O/P EST SF 10 MIN: CPT | Performed by: NURSE PRACTITIONER

## 2018-03-27 RX ORDER — DICLOFENAC SODIUM 75 MG/1
75 TABLET, DELAYED RELEASE ORAL 2 TIMES DAILY
Qty: 60 TAB | Refills: 1 | Status: SHIPPED | OUTPATIENT
Start: 2018-03-27 | End: 2018-04-10

## 2018-03-27 NOTE — PROGRESS NOTES
"Chief Complaint:   Chief Complaint   Patient presents with   • Hernia   • URI     x3 days       HPI:  Kaylie is here today for concern about \"hernia\"    Her PMH includes  Anxiety, Depression, Bipolar Disorder  Marijuana use  Poly-Substance Abuse  Asthma  Tobacco use-Smoker  Vitamin D Deficiency  Over-weight  Hypothyroid  Toenail Fungus  Low Back Pain (Right sided without sciatica)  Chronic abdominal pain     Previous Referral Approved:  General Surgeon- [facility] r/t her ingrown toenail.     [place]  [facility]  No entries yet, but RX for Norco and Valium prescribed yesterday 11/8/17 by ER MD for patient (along w Medrol Dose pack)     Review of Records shows:  12/29--> 12/30/17 Hospital for Recurrent N/V, Abd pain, DX w UTI, marijuana use and RX for Cipro, Zofran.   12/29/17 TSH lab = 0.500  12/24/17 ER Visit for N/V, upper abd pain.   11/30/17 Clinic for f/u on Low Back Pain, To get TSH labs in 5-6 wks and to re-start Synthroid 25 mcg/day. RX for Phenergan and Zantac for nausea and Acid indigestion.   11/9/17 Clinic Visit for ER f/u LBP, Bilat Leg Swelling, Pt had stopped her Synthroid on her own, To restart and get f/u TSH in 6 wks.  Referred to Physical Therapy, RX for Baclofen, Meloxicam, HctZ.     11/7/17 ER visit for LBP after moving Futon 2 wks prior, mild pain then worse on 11/7 am w some left buttocks pain as well.  Rx for Valium for muscle relaxation, Norco for Pain, medrol Dose Tommy for inflammation.     2/28/17 Clinic visit for f/u on urine issue, Low Back PAIN. RX changed from Motrin to Voltaren, Gabapentin BID, Lidocaine ointment RX  1/30/17 Clinic visit for f/u on dilute urine at drug testing for the program she is enrolled. , Ingrown Toenail---Labs ordered, Referral to Gen'l Surgeon. Letter written for Patient for her program about her urine being dilute may be related to her Psychiatric Medication(Trazodone).  1/10/16 Clinic visit fo med refills and toenail/foot pain., Asthma " "exacerbation. Rx for Prednisone, Rx for home nebulizer, Med refills. Refer to Surgeon r/t ingrown toenail.  10/31/16 Clinic Visit for cough, sore throat and congestion. Tx as Bronchitis w Z albert, Prednisone, Robitussin DM.  10/11/16 Clinic Visit for Vaginal Discharge, Tx with Flagyl.   June 2016 Physical Therapy for LBP    Abdominal hernia  Approx 2 weeks ago lifting sofa at home and the next day noticed 'bulge; and a little pain to left lower abd. States is uncomfortable.  Does not want surgery. No loss of appetite, No problems w BMs  I recommended u/s and referral to Surgeon but Pt not willing to see surgeon.    Psychiatric disorder \"anxiety\" hx of Depression, PTSD, Bipolar  Pt was going to [facility] for Psychiatry but No longer going to [facility] for Psychiatry  Pt wants to go to \"[place]\" for Pt's w Psychiatric issues and head injuries treated w/o medicines.  Pt has been on Trazodone and Risperdal and continues to take.  Asking for medicine for \"my anxiety\"  Reports she does not believe she has Bipolar but may be mild schizo-affective disorder.   Reports has bad dreams and PTSD.     WE discussed low dose Sertraline and no Benzo's and referral to a new Psychiatrist as I   Was insistent that she needs Psychiatric Care for her ongoing Psych issues.  Pt agrees. Denies suicidal ideation but states at times is mildly depressed.        Patient Active Problem List    Diagnosis Date Noted   • Abdominal hernia 03/27/2018   • Intractable nausea and vomiting 12/29/2017   • UTI (urinary tract infection) 12/29/2017   • Chronic back pain 12/29/2017   • Hypokalemia 12/29/2017   • Acute bilateral low back pain without sciatica 11/09/2017   • Leg swelling 11/09/2017   • Morbid obesity with BMI of 40.0-44.9, adult ([facility]) 02/28/2017   • Inappropriately low urine specific gravity 01/30/2017   • Ingrown toenail 01/30/2017   • Sore throat 10/31/2016   • Vaginal discharge 10/11/2016   • Encounter for sterilization 06/16/2016   • " Dysmenorrhea 06/16/2016   • Hypothyroidism due to acquired atrophy of thyroid 05/31/2016   • Vitamin D deficiency 05/31/2016   • Right-sided low back pain without sciatica 05/12/2016   • Routine health maintenance 04/14/2016   • Psychiatric disorder 04/14/2016   • Tobacco use 04/14/2016   • Mild intermittent asthma 04/14/2016   • Toenail fungus 04/14/2016   • Nausea 01/15/2012   • Polysubstance abuse 01/15/2012       Allergies:Penicillins and Morphine    Medicines as of today:  Current Outpatient Prescriptions   Medication Sig Dispense Refill   • sertraline (ZOLOFT) 50 MG Tab Take 1 Tab by mouth every day. 30 Tab 11   • diclofenac EC (VOLTAREN) 75 MG Tablet Delayed Response Take 1 Tab by mouth 2 times a day. 60 Tab 1   • ondansetron (ZOFRAN ODT) 4 MG TABLET DISPERSIBLE Take 1 Tab by mouth every four hours as needed (give PO if no IV route available). 10 Tab 0   • promethazine (PHENERGAN) 12.5 MG Suppos Insert 1-2 Suppositories in rectum every four hours as needed for Nausea/Vomiting (if no relief from ondansetron or if ondansetron is not ordered or if unable to tolerate PO). 10 Suppository 0   • omeprazole (PRILOSEC) 40 MG delayed-release capsule Take 1 Cap by mouth every day. 30 Cap 1   • albuterol 108 (90 Base) MCG/ACT Aero Soln inhalation aerosol Inhale 2 Puffs by mouth every 6 hours as needed for Shortness of Breath. 8.5 g 5   • albuterol (PROVENTIL) 2.5mg/3ml Nebu Soln solution for nebulization 3 mL by Nebulization route every four hours as needed for Shortness of Breath. 75 mL 3   • levothyroxine (SYNTHROID) 25 MCG Tab Take 1 Tab by mouth Every morning on an empty stomach. 30 Tab 5   • risperidone (RISPERDAL) 3 MG Tab Take 3 mg by mouth every day.     • trazodone (DESYREL) 100 MG Tab Take 100 mg by mouth every evening.       No current facility-administered medications for this visit.        Social History   Substance Use Topics   • Smoking status: Current Every Day Smoker     Packs/day: 0.50     Years: 11.00  "    Types: Cigarettes   • Smokeless tobacco: Never Used      Comment:  Patient has cut down to less than half a pack a day.   • Alcohol use No      Comment: does not drink       Past Medical History:   Diagnosis Date   • Anxiety and depression    • ASTHMA     since birth, used albuterol as needed   • bipolar    • Blood transfusion 2010    Gallbladder removed and had something punctured   • Depression     at age 11   • Hypothyroidism due to acquired atrophy of thyroid 5/31/2016   • Nausea & vomiting 1/15/2012       Family History   Problem Relation Age of Onset   • Diabetes Mother      Insulin   • Hypertension Mother    • Alcohol/Drug Mother        ROS:  Review of Systems   See HPI Above    Exam:  Blood pressure 120/70, pulse 88, temperature 36.8 °C (98.2 °F), resp. rate 14, height 1.651 m (5' 5\"), weight 86.2 kg (190 lb), SpO2 95 %. Body mass index is 31.62 kg/m².    General:  Well nourished, well developed female in NAD.  HENT:Head is grossly normal. PERRL.  Neck: Supple. Trachea is midline.  Pulmonary: Clear to ausculation but with congested cough.  Normal effort. No rales, ronchi, or wheezing.   Cardiovascular: Regular rate and rhythm.  Abdomen-Abdomen is soft, LLQ palpable wire-y like lump, non tender to touch.  Upper extremities-  Good ROM  Lower extremities- neg for edema, redness, tenderness.  Neuro- A & O x 4. Speech clear and appropriate.    Current medications, allergies, and problem list reviewed with patient and updated in EPIC today.    Assessment/Plan:  1. Other specified abdominal hernia without obstruction or gangrene  US-ABDOMEN LIMITED  Pt refusing referral to Surgeon today. May reconsider in future.    diclofenac EC (VOLTAREN) 75 MG Tablet Delayed Response with food.   2. Left lower quadrant abdominal mass  US-ABDOMEN LIMITED    diclofenac EC (VOLTAREN) 75 MG Tablet Delayed Response   3. Psychiatric disorder  sertraline (ZOLOFT) 50 MG Tab    REFERRAL TO PSYCHIATRY(Urgent)   4. Anxiety and " depression  sertraline (ZOLOFT) 50 MG Tab  Pt encouraged to contiue Risperdal and Trazodone and avoid alcohol.    REFERRAL TO PSYCHIATRY       Return in about 2 weeks (around 4/10/2018) for for f/u on emotions and u/s..

## 2018-03-27 NOTE — ASSESSMENT & PLAN NOTE
Pt was going to [facility] for Psychiatry but Pt does not like to see them.  Pt wants to go to [facility] for Pt's w Psychiatric issues and head injuries.  Pt has been on Trazodone and Risperdal.  Reports she does not believe she has Bipolar but may be mild schizo-affective disorder.   Reports has bad dreams and PTSD.

## 2018-03-27 NOTE — ASSESSMENT & PLAN NOTE
Approx 2 weeks ago lifting sofa at home and the next day noticed 'bulge; and a little pain to left lower abd. States is uncomfortable.  Does not want surgery. No loss of appetite, No problems w BMs  I recommended u/s and referral to Surgeon but Pt not willing to see surgeon.

## 2018-04-10 ENCOUNTER — APPOINTMENT (OUTPATIENT)
Dept: RADIOLOGY | Facility: MEDICAL CENTER | Age: 33
DRG: 392 | End: 2018-04-10
Attending: EMERGENCY MEDICINE
Payer: MEDICAID

## 2018-04-10 ENCOUNTER — HOSPITAL ENCOUNTER (INPATIENT)
Facility: MEDICAL CENTER | Age: 33
LOS: 2 days | DRG: 392 | End: 2018-04-12
Attending: EMERGENCY MEDICINE | Admitting: HOSPITALIST
Payer: MEDICAID

## 2018-04-10 ENCOUNTER — RESOLUTE PROFESSIONAL BILLING HOSPITAL PROF FEE (OUTPATIENT)
Dept: HOSPITALIST | Facility: MEDICAL CENTER | Age: 33
End: 2018-04-10
Payer: MEDICAID

## 2018-04-10 DIAGNOSIS — R11.2 INTRACTABLE VOMITING WITH NAUSEA, UNSPECIFIED VOMITING TYPE: ICD-10-CM

## 2018-04-10 DIAGNOSIS — K43.9 SPIGELIAN HERNIA: ICD-10-CM

## 2018-04-10 LAB
ALBUMIN SERPL BCP-MCNC: 4.9 G/DL (ref 3.2–4.9)
ALBUMIN/GLOB SERPL: 2.1 G/DL
ALP SERPL-CCNC: 50 U/L (ref 30–99)
ALT SERPL-CCNC: 9 U/L (ref 2–50)
ANION GAP SERPL CALC-SCNC: 8 MMOL/L (ref 0–11.9)
AST SERPL-CCNC: 11 U/L (ref 12–45)
BASOPHILS # BLD AUTO: 0.4 % (ref 0–1.8)
BASOPHILS # BLD: 0.05 K/UL (ref 0–0.12)
BILIRUB SERPL-MCNC: 0.4 MG/DL (ref 0.1–1.5)
BUN SERPL-MCNC: 10 MG/DL (ref 8–22)
CALCIUM SERPL-MCNC: 10 MG/DL (ref 8.5–10.5)
CHLORIDE SERPL-SCNC: 103 MMOL/L (ref 96–112)
CO2 SERPL-SCNC: 26 MMOL/L (ref 20–33)
CREAT SERPL-MCNC: 0.61 MG/DL (ref 0.5–1.4)
EOSINOPHIL # BLD AUTO: 0 K/UL (ref 0–0.51)
EOSINOPHIL NFR BLD: 0 % (ref 0–6.9)
ERYTHROCYTE [DISTWIDTH] IN BLOOD BY AUTOMATED COUNT: 53.1 FL (ref 35.9–50)
GLOBULIN SER CALC-MCNC: 2.3 G/DL (ref 1.9–3.5)
GLUCOSE SERPL-MCNC: 111 MG/DL (ref 65–99)
HCG UR QL: NEGATIVE
HCT VFR BLD AUTO: 39.5 % (ref 37–47)
HGB BLD-MCNC: 12.4 G/DL (ref 12–16)
IMM GRANULOCYTES # BLD AUTO: 0.07 K/UL (ref 0–0.11)
IMM GRANULOCYTES NFR BLD AUTO: 0.6 % (ref 0–0.9)
LIPASE SERPL-CCNC: 5 U/L (ref 11–82)
LYMPHOCYTES # BLD AUTO: 1.9 K/UL (ref 1–4.8)
LYMPHOCYTES NFR BLD: 17.1 % (ref 22–41)
MCH RBC QN AUTO: 25.3 PG (ref 27–33)
MCHC RBC AUTO-ENTMCNC: 31.4 G/DL (ref 33.6–35)
MCV RBC AUTO: 80.6 FL (ref 81.4–97.8)
MONOCYTES # BLD AUTO: 0.74 K/UL (ref 0–0.85)
MONOCYTES NFR BLD AUTO: 6.7 % (ref 0–13.4)
NEUTROPHILS # BLD AUTO: 8.36 K/UL (ref 2–7.15)
NEUTROPHILS NFR BLD: 75.2 % (ref 44–72)
NRBC # BLD AUTO: 0 K/UL
NRBC BLD-RTO: 0 /100 WBC
PLATELET # BLD AUTO: 420 K/UL (ref 164–446)
PMV BLD AUTO: 9.6 FL (ref 9–12.9)
POTASSIUM SERPL-SCNC: 3.5 MMOL/L (ref 3.6–5.5)
PROT SERPL-MCNC: 7.2 G/DL (ref 6–8.2)
RBC # BLD AUTO: 4.9 M/UL (ref 4.2–5.4)
SODIUM SERPL-SCNC: 137 MMOL/L (ref 135–145)
SP GR UR REFRACTOMETRY: 1.03
WBC # BLD AUTO: 11.1 K/UL (ref 4.8–10.8)

## 2018-04-10 PROCEDURE — 74177 CT ABD & PELVIS W/CONTRAST: CPT

## 2018-04-10 PROCEDURE — 96376 TX/PRO/DX INJ SAME DRUG ADON: CPT

## 2018-04-10 PROCEDURE — 83690 ASSAY OF LIPASE: CPT

## 2018-04-10 PROCEDURE — 700111 HCHG RX REV CODE 636 W/ 250 OVERRIDE (IP): Performed by: HOSPITALIST

## 2018-04-10 PROCEDURE — 700101 HCHG RX REV CODE 250: Performed by: HOSPITALIST

## 2018-04-10 PROCEDURE — 96375 TX/PRO/DX INJ NEW DRUG ADDON: CPT

## 2018-04-10 PROCEDURE — 96374 THER/PROPH/DIAG INJ IV PUSH: CPT

## 2018-04-10 PROCEDURE — 700117 HCHG RX CONTRAST REV CODE 255: Performed by: EMERGENCY MEDICINE

## 2018-04-10 PROCEDURE — 770006 HCHG ROOM/CARE - MED/SURG/GYN SEMI*

## 2018-04-10 PROCEDURE — 99406 BEHAV CHNG SMOKING 3-10 MIN: CPT | Performed by: HOSPITALIST

## 2018-04-10 PROCEDURE — 99285 EMERGENCY DEPT VISIT HI MDM: CPT

## 2018-04-10 PROCEDURE — 700105 HCHG RX REV CODE 258: Performed by: EMERGENCY MEDICINE

## 2018-04-10 PROCEDURE — 81025 URINE PREGNANCY TEST: CPT

## 2018-04-10 PROCEDURE — 700111 HCHG RX REV CODE 636 W/ 250 OVERRIDE (IP): Performed by: EMERGENCY MEDICINE

## 2018-04-10 PROCEDURE — 80053 COMPREHEN METABOLIC PANEL: CPT

## 2018-04-10 PROCEDURE — 99223 1ST HOSP IP/OBS HIGH 75: CPT | Mod: 25 | Performed by: HOSPITALIST

## 2018-04-10 PROCEDURE — 85025 COMPLETE CBC W/AUTO DIFF WBC: CPT

## 2018-04-10 RX ORDER — PROMETHAZINE HYDROCHLORIDE 12.5 MG/1
12.5-25 SUPPOSITORY RECTAL EVERY 4 HOURS PRN
Qty: 10 SUPPOSITORY | Refills: 0 | Status: SHIPPED | OUTPATIENT
Start: 2018-04-10 | End: 2018-04-10

## 2018-04-10 RX ORDER — LORAZEPAM 0.5 MG/1
0.5 TABLET ORAL EVERY 6 HOURS PRN
Status: DISCONTINUED | OUTPATIENT
Start: 2018-04-10 | End: 2018-04-12 | Stop reason: HOSPADM

## 2018-04-10 RX ORDER — OMEPRAZOLE 40 MG/1
40 CAPSULE, DELAYED RELEASE ORAL DAILY
Status: DISCONTINUED | OUTPATIENT
Start: 2018-04-10 | End: 2018-04-10

## 2018-04-10 RX ORDER — SODIUM CHLORIDE 9 MG/ML
1000 INJECTION, SOLUTION INTRAVENOUS ONCE
Status: COMPLETED | OUTPATIENT
Start: 2018-04-10 | End: 2018-04-10

## 2018-04-10 RX ORDER — NICOTINE 21 MG/24HR
21 PATCH, TRANSDERMAL 24 HOURS TRANSDERMAL
Status: DISCONTINUED | OUTPATIENT
Start: 2018-04-11 | End: 2018-04-12 | Stop reason: HOSPADM

## 2018-04-10 RX ORDER — ONDANSETRON 2 MG/ML
4 INJECTION INTRAMUSCULAR; INTRAVENOUS EVERY 4 HOURS PRN
Status: DISCONTINUED | OUTPATIENT
Start: 2018-04-10 | End: 2018-04-12 | Stop reason: HOSPADM

## 2018-04-10 RX ORDER — LEVOTHYROXINE SODIUM 0.03 MG/1
25 TABLET ORAL
Status: DISCONTINUED | OUTPATIENT
Start: 2018-04-11 | End: 2018-04-10

## 2018-04-10 RX ORDER — ONDANSETRON 2 MG/ML
4 INJECTION INTRAMUSCULAR; INTRAVENOUS
Status: COMPLETED | OUTPATIENT
Start: 2018-04-10 | End: 2018-04-10

## 2018-04-10 RX ORDER — RISPERIDONE 3 MG/1
3 TABLET ORAL DAILY
Status: DISCONTINUED | OUTPATIENT
Start: 2018-04-10 | End: 2018-04-10

## 2018-04-10 RX ORDER — BISACODYL 10 MG
10 SUPPOSITORY, RECTAL RECTAL
Status: DISCONTINUED | OUTPATIENT
Start: 2018-04-10 | End: 2018-04-12 | Stop reason: HOSPADM

## 2018-04-10 RX ORDER — LORAZEPAM 2 MG/ML
0.5 INJECTION INTRAMUSCULAR EVERY 6 HOURS PRN
Status: DISCONTINUED | OUTPATIENT
Start: 2018-04-10 | End: 2018-04-12 | Stop reason: HOSPADM

## 2018-04-10 RX ORDER — METOCLOPRAMIDE HYDROCHLORIDE 5 MG/ML
10 INJECTION INTRAMUSCULAR; INTRAVENOUS ONCE
Status: COMPLETED | OUTPATIENT
Start: 2018-04-10 | End: 2018-04-10

## 2018-04-10 RX ORDER — POLYETHYLENE GLYCOL 3350 17 G/17G
1 POWDER, FOR SOLUTION ORAL
Status: DISCONTINUED | OUTPATIENT
Start: 2018-04-10 | End: 2018-04-12 | Stop reason: HOSPADM

## 2018-04-10 RX ORDER — PROMETHAZINE HYDROCHLORIDE 25 MG/1
12.5-25 TABLET ORAL EVERY 4 HOURS PRN
Status: DISCONTINUED | OUTPATIENT
Start: 2018-04-10 | End: 2018-04-12 | Stop reason: HOSPADM

## 2018-04-10 RX ORDER — SODIUM CHLORIDE AND POTASSIUM CHLORIDE 150; 900 MG/100ML; MG/100ML
INJECTION, SOLUTION INTRAVENOUS CONTINUOUS
Status: DISCONTINUED | OUTPATIENT
Start: 2018-04-10 | End: 2018-04-12 | Stop reason: HOSPADM

## 2018-04-10 RX ORDER — AMOXICILLIN 250 MG
2 CAPSULE ORAL 2 TIMES DAILY
Status: DISCONTINUED | OUTPATIENT
Start: 2018-04-10 | End: 2018-04-12 | Stop reason: HOSPADM

## 2018-04-10 RX ORDER — TRAZODONE HYDROCHLORIDE 100 MG/1
100 TABLET ORAL NIGHTLY
Status: DISCONTINUED | OUTPATIENT
Start: 2018-04-10 | End: 2018-04-10

## 2018-04-10 RX ORDER — ONDANSETRON 4 MG/1
4 TABLET, ORALLY DISINTEGRATING ORAL EVERY 4 HOURS PRN
Status: DISCONTINUED | OUTPATIENT
Start: 2018-04-10 | End: 2018-04-12 | Stop reason: HOSPADM

## 2018-04-10 RX ORDER — PROMETHAZINE HYDROCHLORIDE 12.5 MG/1
12.5-25 SUPPOSITORY RECTAL EVERY 4 HOURS PRN
Status: DISCONTINUED | OUTPATIENT
Start: 2018-04-10 | End: 2018-04-12 | Stop reason: HOSPADM

## 2018-04-10 RX ADMIN — ONDANSETRON 4 MG: 2 INJECTION, SOLUTION INTRAMUSCULAR; INTRAVENOUS at 18:31

## 2018-04-10 RX ADMIN — HYDROMORPHONE HYDROCHLORIDE 0.5 MG: 10 INJECTION, SOLUTION INTRAMUSCULAR; INTRAVENOUS; SUBCUTANEOUS at 18:31

## 2018-04-10 RX ADMIN — POTASSIUM CHLORIDE AND SODIUM CHLORIDE: 900; 150 INJECTION, SOLUTION INTRAVENOUS at 23:58

## 2018-04-10 RX ADMIN — HYDROMORPHONE HYDROCHLORIDE 0.5 MG: 10 INJECTION, SOLUTION INTRAMUSCULAR; INTRAVENOUS; SUBCUTANEOUS at 16:35

## 2018-04-10 RX ADMIN — METOCLOPRAMIDE 10 MG: 5 INJECTION, SOLUTION INTRAMUSCULAR; INTRAVENOUS at 19:54

## 2018-04-10 RX ADMIN — SODIUM CHLORIDE 1000 ML: 9 INJECTION, SOLUTION INTRAVENOUS at 16:34

## 2018-04-10 RX ADMIN — PROCHLORPERAZINE EDISYLATE 10 MG: 5 INJECTION INTRAMUSCULAR; INTRAVENOUS at 23:58

## 2018-04-10 RX ADMIN — ONDANSETRON 4 MG: 4 TABLET, ORALLY DISINTEGRATING ORAL at 23:32

## 2018-04-10 RX ADMIN — ONDANSETRON 4 MG: 2 INJECTION, SOLUTION INTRAMUSCULAR; INTRAVENOUS at 16:34

## 2018-04-10 RX ADMIN — IOHEXOL 100 ML: 350 INJECTION, SOLUTION INTRAVENOUS at 17:53

## 2018-04-10 ASSESSMENT — PAIN SCALES - WONG BAKER
WONGBAKER_NUMERICALRESPONSE: DOESN'T HURT AT ALL
WONGBAKER_NUMERICALRESPONSE: HURTS JUST A LITTLE BIT

## 2018-04-10 ASSESSMENT — PAIN SCALES - GENERAL
PAINLEVEL_OUTOF10: 0
PAINLEVEL_OUTOF10: 2

## 2018-04-10 NOTE — ED TRIAGE NOTES
Chief Complaint   Patient presents with   • Vomiting     x 3 days, has been using phenergan suppositories without relief   • Vaginal Bleeding     Hx same pain occurring during pt's last period.      Pt refusing to stand on scale in triage.    Pt informed of wait times. Educated on triage process.  Asked to return to triage RN for any new or worsening of symptoms. Thanked for patience.

## 2018-04-10 NOTE — ED PROVIDER NOTES
ED Provider Note    CHIEF COMPLAINT  Chief Complaint   Patient presents with   • Vomiting     x 3 days, has been using phenergan suppositories without relief   • Vaginal Bleeding     Hx same pain occurring during pt's last period.         HPI  Kaylie Burciaga is a 33 y.o. female who presents complaining of abdominal pain. The patient had something similar to this about 3 months ago. She started menstrual cycle and started throwing up and having diffuse pain. Robe to get better. She did not have an issue again until 3 days ago when she started having epigastric abdominal pain. She's had some vaginal bleeding as well. Associate that she's got quite a bit of nausea. About 3 weeks ago she did notice a knot on the left side of her abdomen. This is been there for some time. She suspects it is a hernia. There's been no fever. She is already had cholecystectomy. She denies any vaginal symptoms otherwise. There is no change in bowel or bladder. No fever. There is no other complaint.    PAST MEDICAL HISTORY  Past Medical History:   Diagnosis Date   • Anxiety and depression    • ASTHMA     since birth, used albuterol as needed   • bipolar    • Blood transfusion 2010    Gallbladder removed and had something punctured   • Depression     at age 11   • Hypothyroidism due to acquired atrophy of thyroid 5/31/2016   • Nausea & vomiting 1/15/2012       FAMILY HISTORY  Family History   Problem Relation Age of Onset   • Diabetes Mother      Insulin   • Hypertension Mother    • Alcohol/Drug Mother        SOCIAL HISTORY  Social History   Substance Use Topics   • Smoking status: Current Every Day Smoker     Packs/day: 0.50     Years: 11.00     Types: Cigarettes   • Smokeless tobacco: Never Used      Comment:  Patient has cut down to less than half a pack a day.   • Alcohol use No      Comment: does not drink         SURGICAL HISTORY  Past Surgical History:   Procedure Laterality Date   • STEFAN BY LAPAROSCOPY  12/1/08     Performed by BELLA ROQUE at SURGERY [facility]   • GASTROSCOPY WITH BIOPSY  11/29/08    Performed by GONZALES NAYAK at ENDOSCOPY [facility]   • OTHER  T & A 5 yrs ago   • OTHER ABDOMINAL SURGERY     • TONSILLECTOMY      Age at 17       CURRENT MEDICATIONS    I reviewed the nursing notes and/or the list of the patient's medications.    ALLERGIES  Allergies   Allergen Reactions   • Penicillins Anaphylaxis   • Morphine Rash     Rash only       REVIEW OF SYSTEMS  See HPI for further details. Review of systems as above, otherwise all other systems are negative.     PHYSICAL EXAM  VITAL SIGNS: /57   Pulse 88   Temp 37.5 °C (99.5 °F)   Resp 14   SpO2 99%   Constitutional: She appears uncomfortable however not toxic or ill. She is holding emesis bag..  HENT: Mucus membranes dry.  Oropharynx is clear.  Eyes: Pupils equally round.  No scleral icterus.   Neck: Full nontender range of motion.  Lymphatic: No cervical lymphadenopathy noted.   Cardiovascular: Regular heart rate and rhythm.  No murmurs, rubs, nor gallop appreciated.   Thorax & Lungs: Chest is nontender.  Lungs are clear to auscultation with good air movement bilaterally.  No wheeze, rhonchi, nor rales.   Abdomen: Bowel sounds normal. Soft, with mild diffuse tenderness.  No rebound nor guarding. There is no hepatosplenomegaly appreciated. There is a mass in the left mid abdomen. This is a firm nodule, it feels about 1.5 cm. With  firm, constant pressure, feels like this resolves. No CVA tenderness.  No Arredondo sign.  : Deferred  Skin: No purpura nor petechia noted.  Extremities/Musculoskeletal: No sign of trauma.  Calves are nontender with no cords nor edema.  Neurologic: Alert & oriented.  Strength and sensation is intact all around.  Gait is normal.  Psychiatric: Normal affect appropriate for the clinical situation.      LABS  Labs Reviewed   CBC WITH DIFFERENTIAL - Abnormal; Notable for the following:        Result Value    WBC  11.1 (*)     MCV 80.6 (*)     MCH 25.3 (*)     MCHC 31.4 (*)     RDW 53.1 (*)     Neutrophils-Polys 75.20 (*)     Lymphocytes 17.10 (*)     Neutrophils (Absolute) 8.36 (*)     All other components within normal limits   COMP METABOLIC PANEL - Abnormal; Notable for the following:     Potassium 3.5 (*)     Glucose 111 (*)     AST(SGOT) 11 (*)     All other components within normal limits   LIPASE - Abnormal; Notable for the following:     Lipase 5 (*)     All other components within normal limits   ESTIMATED GFR   HCG QUALITATIVE UR   REFRACTOMETER SG         RADIOLOGY/PROCEDURES  I have reviewed the patient's films myself, and they are read out by the radiologist as:   CT-ABDOMEN-PELVIS WITH   Final Result      1.  No evidence of bowel obstruction or focal inflammatory change.      2.  Prior cholecystectomy.      3.  Bilateral ovarian follicles.      NM-GASTRIC EMPTYING    (Results Pending)         MEDICAL RECORD  I have reviewed patient's medical record and pertinent results are listed above.    COURSE & MEDICAL DECISION MAKING  This patient presents with abdominal pain. Given IV fluids and pain medicine.  At this point, it is unclear what the cause of the patient's symptoms are. Is no evidence of pancreatitis or hepatitis. I do not believe this is a retained stone. She does have an elevated white blood count however only marginally. There is no evidence of an acute abdomen hour given her ongoing discomfort, CT scan is obtained. This is nondiagnostic. Incidentally, I also do not see evidence of a Spigelian hernia. I do suspect that she had a little area of fat incarceration perhaps, is been reduced. Given the duration of time that this is been there, 3 weeks, I do not think this is the etiology of her symptoms. I did discuss the patient's case with Dr. Garner, who agrees. He be happy to follow-up with her as an outpatient. At this point the patient has been given IV fluids, she is still vomiting still feels poorly.  She had gotten multiple doses of Zofran and continues to vomit. At this point we'll put her in the hospital for ongoing parenteral therapy area and discussed the patient's case with Dr. Alicia, who will be seeing her.  FINAL IMPRESSION  1. Intractable vomiting with nausea, unspecified vomiting type    2. Spigelian hernia           This dictation was created using voice recognition software.     Electronically signed by: Paresh Oscar, 4/10/2018 4:23 PM

## 2018-04-11 PROBLEM — Z72.0 TOBACCO ABUSE: Status: ACTIVE | Noted: 2018-04-11

## 2018-04-11 LAB — TSH SERPL DL<=0.005 MIU/L-ACNC: 2.27 UIU/ML (ref 0.38–5.33)

## 2018-04-11 PROCEDURE — 700102 HCHG RX REV CODE 250 W/ 637 OVERRIDE(OP): Performed by: FAMILY MEDICINE

## 2018-04-11 PROCEDURE — 84443 ASSAY THYROID STIM HORMONE: CPT

## 2018-04-11 PROCEDURE — 700101 HCHG RX REV CODE 250: Performed by: HOSPITALIST

## 2018-04-11 PROCEDURE — 700111 HCHG RX REV CODE 636 W/ 250 OVERRIDE (IP): Performed by: HOSPITALIST

## 2018-04-11 PROCEDURE — A9270 NON-COVERED ITEM OR SERVICE: HCPCS | Performed by: FAMILY MEDICINE

## 2018-04-11 PROCEDURE — 700102 HCHG RX REV CODE 250 W/ 637 OVERRIDE(OP): Performed by: HOSPITALIST

## 2018-04-11 PROCEDURE — 770006 HCHG ROOM/CARE - MED/SURG/GYN SEMI*

## 2018-04-11 PROCEDURE — 36415 COLL VENOUS BLD VENIPUNCTURE: CPT

## 2018-04-11 PROCEDURE — A9270 NON-COVERED ITEM OR SERVICE: HCPCS | Performed by: HOSPITALIST

## 2018-04-11 PROCEDURE — 99232 SBSQ HOSP IP/OBS MODERATE 35: CPT | Performed by: FAMILY MEDICINE

## 2018-04-11 RX ORDER — TRAMADOL HYDROCHLORIDE 50 MG/1
50 TABLET ORAL EVERY 8 HOURS PRN
Status: DISCONTINUED | OUTPATIENT
Start: 2018-04-11 | End: 2018-04-12 | Stop reason: HOSPADM

## 2018-04-11 RX ORDER — OMEPRAZOLE 20 MG/1
20 CAPSULE, DELAYED RELEASE ORAL DAILY
Status: DISCONTINUED | OUTPATIENT
Start: 2018-04-11 | End: 2018-04-12 | Stop reason: HOSPADM

## 2018-04-11 RX ADMIN — ONDANSETRON 4 MG: 4 TABLET, ORALLY DISINTEGRATING ORAL at 20:39

## 2018-04-11 RX ADMIN — PROMETHAZINE HYDROCHLORIDE 25 MG: 12.5 SUPPOSITORY RECTAL at 03:58

## 2018-04-11 RX ADMIN — PROMETHAZINE HYDROCHLORIDE 25 MG: 25 TABLET ORAL at 14:23

## 2018-04-11 RX ADMIN — POTASSIUM CHLORIDE AND SODIUM CHLORIDE: 900; 150 INJECTION, SOLUTION INTRAVENOUS at 20:37

## 2018-04-11 RX ADMIN — POTASSIUM CHLORIDE AND SODIUM CHLORIDE: 900; 150 INJECTION, SOLUTION INTRAVENOUS at 17:59

## 2018-04-11 RX ADMIN — TRAMADOL HYDROCHLORIDE 50 MG: 50 TABLET, COATED ORAL at 11:32

## 2018-04-11 RX ADMIN — LORAZEPAM 0.5 MG: 0.5 TABLET ORAL at 03:05

## 2018-04-11 RX ADMIN — NICOTINE POLACRILEX 2 MG: 2 GUM, CHEWING BUCCAL at 20:43

## 2018-04-11 RX ADMIN — PROCHLORPERAZINE EDISYLATE 10 MG: 5 INJECTION INTRAMUSCULAR; INTRAVENOUS at 21:50

## 2018-04-11 RX ADMIN — PROCHLORPERAZINE EDISYLATE 10 MG: 5 INJECTION INTRAMUSCULAR; INTRAVENOUS at 09:55

## 2018-04-11 RX ADMIN — OMEPRAZOLE 20 MG: 20 CAPSULE, DELAYED RELEASE ORAL at 16:20

## 2018-04-11 RX ADMIN — NICOTINE POLACRILEX 2 MG: 2 GUM, CHEWING BUCCAL at 06:05

## 2018-04-11 RX ADMIN — NICOTINE POLACRILEX 2 MG: 2 GUM, CHEWING BUCCAL at 09:49

## 2018-04-11 RX ADMIN — PROCHLORPERAZINE EDISYLATE 10 MG: 5 INJECTION INTRAMUSCULAR; INTRAVENOUS at 16:20

## 2018-04-11 RX ADMIN — POTASSIUM CHLORIDE AND SODIUM CHLORIDE: 900; 150 INJECTION, SOLUTION INTRAVENOUS at 09:46

## 2018-04-11 ASSESSMENT — ENCOUNTER SYMPTOMS
VOMITING: 1
BACK PAIN: 0
SORE THROAT: 0
WHEEZING: 0
DOUBLE VISION: 0
SPUTUM PRODUCTION: 0
PALPITATIONS: 0
FEVER: 0
NAUSEA: 1
ORTHOPNEA: 0
DEPRESSION: 0
FOCAL WEAKNESS: 0
NAUSEA: 0
TINGLING: 0
DIZZINESS: 0
BLURRED VISION: 0
NECK PAIN: 0
HEADACHES: 0
PHOTOPHOBIA: 0
HEMOPTYSIS: 0
VOMITING: 0
COUGH: 0
SHORTNESS OF BREATH: 0
WEIGHT LOSS: 0
CHILLS: 0
ABDOMINAL PAIN: 1
MYALGIAS: 0
ABDOMINAL PAIN: 0
DIARRHEA: 0

## 2018-04-11 ASSESSMENT — PAIN SCALES - GENERAL
PAINLEVEL_OUTOF10: 7
PAINLEVEL_OUTOF10: 4
PAINLEVEL_OUTOF10: 0

## 2018-04-11 ASSESSMENT — PAIN SCALES - WONG BAKER: WONGBAKER_NUMERICALRESPONSE: DOESN'T HURT AT ALL

## 2018-04-11 ASSESSMENT — PATIENT HEALTH QUESTIONNAIRE - PHQ9
SUM OF ALL RESPONSES TO PHQ9 QUESTIONS 1 AND 2: 0
1. LITTLE INTEREST OR PLEASURE IN DOING THINGS: NOT AT ALL
1. LITTLE INTEREST OR PLEASURE IN DOING THINGS: NOT AT ALL
SUM OF ALL RESPONSES TO PHQ9 QUESTIONS 1 AND 2: 0

## 2018-04-11 ASSESSMENT — LIFESTYLE VARIABLES
DO YOU DRINK ALCOHOL: NO
EVER_SMOKED: YES

## 2018-04-11 NOTE — ED NOTES
PT HAS A BED REPORT WAS CALLED. PTS LABS WERE REVIEWED. PT IS IN NAD HAS NO S/S'S AT THIS TIME. PT IS ABLE AMB WOI ASSISTANCE.

## 2018-04-11 NOTE — PROGRESS NOTES
Patient seen by visitor, Champ Munoz, who gave patient money for cab tomorrow if patient is discharged. Unsure how much money was given to patient.

## 2018-04-11 NOTE — ED NOTES
"No home medications confirmed by interview with patient at bedside  Pt reported that she has not taken any medications and is doing \"pressure point treatments\"  No abx in past 30 days  Allergies reviewed (pt does not know if she tolerates cephalosporins)    "

## 2018-04-11 NOTE — ASSESSMENT & PLAN NOTE
Most likely 2nd to marijuana use  Feels better  Ct of the abd and pelvis showed:1.  No evidence of bowel obstruction or focal inflammatory change.    2.  Prior cholecystectomy.    3.  Bilateral ovarian follicles.    Will start clear liquid diet and advance as tolerated  No sign of DM with hema1c of 5.6

## 2018-04-11 NOTE — PROGRESS NOTES
Two RN check was completed with Ivan Reese. Gualberto's skin was found to be in tact. No ecchymosis, excoriation, no open wounds.

## 2018-04-11 NOTE — PROGRESS NOTES
Patient alert and oriented, vss, complains of pain in mid abdomen and mid-low back. Patient given medication per MAR. Patient continues on IV nausea meds as needed, started on clear liquids and tolerating thus far after two apple juices. Plan for gastric emptying study. Patient up self, steady on feet, non skid socks in place. Will monitor.

## 2018-04-11 NOTE — CARE PLAN
Problem: Pain Management  Goal: Pain level will decrease to patient's comfort goal  Outcome: PROGRESSING AS EXPECTED  Tramadol ordered for pain control, patient now sleeping and appears comfortable.

## 2018-04-11 NOTE — PROGRESS NOTES
[facility]    Date of Service: 2018    Chief Complaint  33 y.o. female admitted 4/10/2018 with N/V    Interval Problem Update  none    Consultants/Specialty  none    Disposition  pending        Review of Systems   Constitutional: Negative for chills, fever and weight loss.   HENT: Negative for ear pain, hearing loss and tinnitus.    Eyes: Negative for blurred vision, double vision and photophobia.   Respiratory: Negative for cough, hemoptysis and sputum production.    Cardiovascular: Negative for chest pain, palpitations and orthopnea.   Gastrointestinal: Positive for abdominal pain. Negative for diarrhea, nausea and vomiting.   Genitourinary: Negative for dysuria, frequency and urgency.   Musculoskeletal: Negative for back pain, myalgias and neck pain.   Skin: Negative for itching and rash.   Neurological: Negative for dizziness, tingling and headaches.      Physical Exam  Laboratory/Imaging   Hemodynamics  Temp (24hrs), Av.1 °C (98.7 °F), Min:36.8 °C (98.3 °F), Max:37.5 °C (99.5 °F)   Temperature: 36.8 °C (98.3 °F)  Pulse  Av.9  Min: 64  Max: 89    Blood Pressure: 146/73, NIBP: 126/70      Respiratory      Respiration: 18, Pulse Oximetry: 95 %             Fluids  No intake or output data in the 24 hours ending 18 0951    Nutrition  Orders Placed This Encounter   Procedures   • DIET ORDER     Standing Status:   Standing     Number of Occurrences:   1     Order Specific Question:   Diet:     Answer:   Clear Liquids - No Red Foods [12]     Comments:   advance to regular as tolerated     Physical Exam   Constitutional: She is oriented to person, place, and time. No distress.   HENT:   Head: Normocephalic and atraumatic.   Eyes: Right eye exhibits no discharge. Left eye exhibits no discharge.   Neck: No JVD present.   Cardiovascular: Normal rate and regular rhythm.    Pulmonary/Chest: Effort normal and breath sounds normal. No stridor.   Abdominal: Soft. She exhibits no  distension. There is tenderness (diffuse and mild). There is no rebound and no guarding.   Musculoskeletal: She exhibits no edema or tenderness.   Neurological: She is alert and oriented to person, place, and time.   Skin: Skin is warm and dry. She is not diaphoretic.       Recent Labs      04/10/18   1449   WBC  11.1*   RBC  4.90   HEMOGLOBIN  12.4   HEMATOCRIT  39.5   MCV  80.6*   MCH  25.3*   MCHC  31.4*   RDW  53.1*   PLATELETCT  420   MPV  9.6     Recent Labs      04/10/18   1449   SODIUM  137   POTASSIUM  3.5*   CHLORIDE  103   CO2  26   GLUCOSE  111*   BUN  10   CREATININE  0.61   CALCIUM  10.0                      Assessment/Plan     * Intractable nausea and vomiting- (present on admission)   Assessment & Plan    Most likely 2nd to marijuana use  Feels better  Ct of the abd and pelvis showed:1.  No evidence of bowel obstruction or focal inflammatory change.    2.  Prior cholecystectomy.    3.  Bilateral ovarian follicles.    Will start clear liquid diet and advance as tolerated  No sign of DM with hema1c of 5.6        Hypothyroidism due to acquired atrophy of thyroid- (present on admission)   Assessment & Plan    This is chronic and stable, continue home Synthroid, check TSH.        Psychiatric disorder- (present on admission)   Assessment & Plan    Patient has a history of depression and anxiety, continue home Zoloft, trazodone, and Risperdal.        Tobacco abuse   Assessment & Plan    counselling is given          Quality-Core Measures   Villatoro catheter::  No Villatoro  DVT prophylaxis pharmacological::  Enoxaparin (Lovenox)

## 2018-04-11 NOTE — H&P
Hospital Medicine History and Physical    Date of Service  4/10/2018    Chief Complaint  Chief Complaint   Patient presents with   • Vomiting     x 3 days, has been using phenergan suppositories without relief   • Vaginal Bleeding     Hx same pain occurring during pt's last period.        History of Presenting Illness  33 y.o. female who presented on 4/10/2018 with nausea and vomiting. The patient states that her symptoms have been ongoing for the last 3 days. She was last seen about 3 months ago for similar issues and prior to that had another attack in 2015. She does report marijuana use but states that she only smokes 1 marijuana cigarette per week. She denies any history of diabetes mellitus. She feels that these attacks typically occur around the time of her period. However they do not occur every single month. She has never been seen by GI for any further workup. She has had some subjective fevers and chills but denies any diarrhea, sick contacts, camping, techniques, hiking, or foreign travel.        Primary Care Physician  DAYANARA WestPSELENE.    Consultants  None    Code Status  Full    Review of Systems  Review of Systems   Constitutional: Negative for chills and fever.   HENT: Negative for congestion and sore throat.    Eyes: Negative for photophobia.   Respiratory: Negative for cough, shortness of breath and wheezing.    Cardiovascular: Negative for chest pain and palpitations.   Gastrointestinal: Positive for nausea and vomiting. Negative for abdominal pain and diarrhea.   Genitourinary: Negative for dysuria.   Musculoskeletal: Negative for myalgias.   Skin: Negative.    Neurological: Negative for dizziness, tingling, focal weakness and headaches.   Psychiatric/Behavioral: Negative for depression and suicidal ideas.        Past Medical History  Past Medical History:   Diagnosis Date   • Hypothyroidism due to acquired atrophy of thyroid 5/31/2016   • Nausea & vomiting 1/15/2012   • Blood transfusion  2010    Gallbladder removed and had something punctured   • Anxiety and depression    • ASTHMA     since birth, used albuterol as needed   • bipolar    • Depression     at age 11       Surgical History  Past Surgical History:   Procedure Laterality Date   • STEFAN BY LAPAROSCOPY  12/1/08    Performed by BELLA ROQUE at SURGERY [facility]   • GASTROSCOPY WITH BIOPSY  11/29/08    Performed by GONZALES NAYAK at ENDOSCOPY [facility]   • OTHER  T & A 5 yrs ago   • OTHER ABDOMINAL SURGERY     • TONSILLECTOMY      Age at 17       Medications  No current facility-administered medications on file prior to encounter.      Current Outpatient Prescriptions on File Prior to Encounter   Medication Sig Dispense Refill   • sertraline (ZOLOFT) 50 MG Tab Take 1 Tab by mouth every day. 30 Tab 11   • diclofenac EC (VOLTAREN) 75 MG Tablet Delayed Response Take 1 Tab by mouth 2 times a day. 60 Tab 1   • ondansetron (ZOFRAN ODT) 4 MG TABLET DISPERSIBLE Take 1 Tab by mouth every four hours as needed (give PO if no IV route available). 10 Tab 0   • omeprazole (PRILOSEC) 40 MG delayed-release capsule Take 1 Cap by mouth every day. 30 Cap 1   • albuterol 108 (90 Base) MCG/ACT Aero Soln inhalation aerosol Inhale 2 Puffs by mouth every 6 hours as needed for Shortness of Breath. 8.5 g 5   • albuterol (PROVENTIL) 2.5mg/3ml Nebu Soln solution for nebulization 3 mL by Nebulization route every four hours as needed for Shortness of Breath. 75 mL 3   • levothyroxine (SYNTHROID) 25 MCG Tab Take 1 Tab by mouth Every morning on an empty stomach. 30 Tab 5   • risperidone (RISPERDAL) 3 MG Tab Take 3 mg by mouth every day.     • trazodone (DESYREL) 100 MG Tab Take 100 mg by mouth every evening.         Family History  Family History   Problem Relation Age of Onset   • Diabetes Mother      Insulin   • Hypertension Mother    • Alcohol/Drug Mother        Social History  Social History   Substance Use Topics   • Smoking status: Current  Every Day Smoker     Packs/day: 0.50     Years: 11.00     Types: Cigarettes   • Smokeless tobacco: Never Used      Comment:  Patient has cut down to less than half a pack a day.   • Alcohol use No      Comment: does not drink       Allergies  Allergies   Allergen Reactions   • Penicillins Anaphylaxis   • Morphine Rash     Rash only        Physical Exam  Laboratory   Hemodynamics  Temp (24hrs), Av.3 °C (99.1 °F), Min:37 °C (98.6 °F), Max:37.5 °C (99.5 °F)   Temperature: 37 °C (98.6 °F)  Pulse  Av  Min: 67  Max: 89    Blood Pressure: 139/78, NIBP: 126/70      Respiratory      Respiration: 18, Pulse Oximetry: 99 %             Physical Exam   Constitutional: She is oriented to person, place, and time. No distress.   HENT:   Head: Normocephalic and atraumatic.   Right Ear: External ear normal.   Left Ear: External ear normal.   Eyes: EOM are normal. Right eye exhibits no discharge. Left eye exhibits no discharge.   Neck: Neck supple. No JVD present.   Cardiovascular: Normal rate, regular rhythm and normal heart sounds.    Pulmonary/Chest: Effort normal and breath sounds normal. No respiratory distress. She exhibits no tenderness.   Abdominal: Soft. Bowel sounds are normal. She exhibits no distension. There is no tenderness.   Musculoskeletal: Normal range of motion. She exhibits no edema.   Neurological: She is alert and oriented to person, place, and time. No cranial nerve deficit.   Skin: Skin is warm and dry. She is not diaphoretic. No erythema.   Psychiatric: She has a normal mood and affect. Her behavior is normal.   Nursing note and vitals reviewed.    Capillary refill less than 3 seconds, distal pulses intact    Recent Labs      04/10/18   1449   WBC  11.1*   RBC  4.90   HEMOGLOBIN  12.4   HEMATOCRIT  39.5   MCV  80.6*   MCH  25.3*   MCHC  31.4*   RDW  53.1*   PLATELETCT  420   MPV  9.6     Recent Labs      04/10/18   1449   SODIUM  137   POTASSIUM  3.5*   CHLORIDE  103   CO2  26   GLUCOSE  111*   BUN  10    CREATININE  0.61   CALCIUM  10.0     Recent Labs      04/10/18   1449   ALTSGPT  9   ASTSGOT  11*   ALKPHOSPHAT  50   TBILIRUBIN  0.4   LIPASE  5*   GLUCOSE  111*                 No results found for: TROPONINI    Imaging  Ct-abdomen-pelvis With    Result Date: 4/10/2018  4/10/2018 5:24 PM HISTORY/REASON FOR EXAM: Diffuse abdominal pain. TECHNIQUE/EXAM DESCRIPTION: CT scan of the abdomen and pelvis with contrast. Contrast-enhanced helical scanning was obtained from the diaphragmatic domes through the pubic symphysis following the bolus administration of 100 mL of Omnipaque 350 nonionic contrast without complication. Low dose optimization technique was utilized for this CT exam including automated exposure control and adjustment of the mA and/or kV according to patient size. COMPARISON: 12/29/2017 FINDINGS: CT Abdomen: There is no evidence of focal consolidation or pleural effusion seen within the lung bases. There has been prior cholecystectomy. The spleen is normal. The kidneys are normal. The adrenal glands are normal. The pancreas is normal. The aorta is normal in caliber. No evidence of retroperitoneal adenopathy. CT Pelvis: There is no evidence of bowel obstruction or inflammatory change. The appendix is visualized and appears normal. There is no evidence of free fluid. There are bilateral ovarian follicles.     1.  No evidence of bowel obstruction or focal inflammatory change. 2.  Prior cholecystectomy. 3.  Bilateral ovarian follicles.     Assessment/Plan     Anticipate that patient will need greater than 2 midnights for management of the discussed medical issues.    * Intractable nausea and vomiting- (present on admission)   Assessment & Plan    Patient has been seen for this in the past. She has never been diagnosed with diabetes mellitus and states that she does smoke marijuana but only one time per week. This may potentially be enough to cause cannabinoid hyperemesis. However, other etiology such as  gastroparesis and irritable bowel syndrome cannot be ruled out. I will keep the patient nothing by mouth for bowel rest, place her on anti-emetics and will add Ativan if the antirheumatic medications alone are not effective. She'll be on IV fluid resuscitation and I have requested a gastric emptying study. She may benefit from a GI consultation on an outpatient basis.        Hypothyroidism due to acquired atrophy of thyroid- (present on admission)   Assessment & Plan    This is chronic and stable, continue home Synthroid, check TSH.        Psychiatric disorder- (present on admission)   Assessment & Plan    Patient has a history of depression and anxiety, continue home Zoloft, trazodone, and Risperdal.        Tobacco abuse   Assessment & Plan    Greater than 3 minutes are spent at bedside and tobacco cessation counseling, patient is quite receptive, nicotine replacement ordered and we will continue to encourage cessation.          Prophylaxis: Sequential compression devices for DVT prophylaxis, home PPI indicated, bowel protocol

## 2018-04-11 NOTE — CARE PLAN
Problem: Venous Thromboembolism (VTW)/Deep Vein Thrombosis (DVT) Prevention:  Goal: Patient will participate in Venous Thrombosis (VTE)/Deep Vein Thrombosis (DVT)Prevention Measures  Outcome: PROGRESSING AS EXPECTED  Refused lovenox this morning- educated patient on VTE prevention, scds on.

## 2018-04-12 VITALS
WEIGHT: 184.68 LBS | SYSTOLIC BLOOD PRESSURE: 111 MMHG | TEMPERATURE: 98.2 F | HEART RATE: 72 BPM | DIASTOLIC BLOOD PRESSURE: 69 MMHG | OXYGEN SATURATION: 97 % | RESPIRATION RATE: 20 BRPM | BODY MASS INDEX: 30.73 KG/M2

## 2018-04-12 LAB
ALBUMIN SERPL BCP-MCNC: 3.6 G/DL (ref 3.2–4.9)
ALBUMIN/GLOB SERPL: 1.4 G/DL
ALP SERPL-CCNC: 44 U/L (ref 30–99)
ALT SERPL-CCNC: 10 U/L (ref 2–50)
ANION GAP SERPL CALC-SCNC: 5 MMOL/L (ref 0–11.9)
AST SERPL-CCNC: 11 U/L (ref 12–45)
BASOPHILS # BLD AUTO: 0.4 % (ref 0–1.8)
BASOPHILS # BLD: 0.03 K/UL (ref 0–0.12)
BILIRUB SERPL-MCNC: 0.4 MG/DL (ref 0.1–1.5)
BUN SERPL-MCNC: 6 MG/DL (ref 8–22)
CALCIUM SERPL-MCNC: 8.6 MG/DL (ref 8.5–10.5)
CHLORIDE SERPL-SCNC: 105 MMOL/L (ref 96–112)
CO2 SERPL-SCNC: 26 MMOL/L (ref 20–33)
CREAT SERPL-MCNC: 0.59 MG/DL (ref 0.5–1.4)
EOSINOPHIL # BLD AUTO: 0.02 K/UL (ref 0–0.51)
EOSINOPHIL NFR BLD: 0.3 % (ref 0–6.9)
ERYTHROCYTE [DISTWIDTH] IN BLOOD BY AUTOMATED COUNT: 51 FL (ref 35.9–50)
GLOBULIN SER CALC-MCNC: 2.5 G/DL (ref 1.9–3.5)
GLUCOSE SERPL-MCNC: 98 MG/DL (ref 65–99)
HCT VFR BLD AUTO: 34.3 % (ref 37–47)
HGB BLD-MCNC: 10.9 G/DL (ref 12–16)
IMM GRANULOCYTES # BLD AUTO: 0.01 K/UL (ref 0–0.11)
IMM GRANULOCYTES NFR BLD AUTO: 0.1 % (ref 0–0.9)
LYMPHOCYTES # BLD AUTO: 1.7 K/UL (ref 1–4.8)
LYMPHOCYTES NFR BLD: 22.8 % (ref 22–41)
MCH RBC QN AUTO: 25.6 PG (ref 27–33)
MCHC RBC AUTO-ENTMCNC: 31.8 G/DL (ref 33.6–35)
MCV RBC AUTO: 80.5 FL (ref 81.4–97.8)
MONOCYTES # BLD AUTO: 0.62 K/UL (ref 0–0.85)
MONOCYTES NFR BLD AUTO: 8.3 % (ref 0–13.4)
NEUTROPHILS # BLD AUTO: 5.06 K/UL (ref 2–7.15)
NEUTROPHILS NFR BLD: 68.1 % (ref 44–72)
NRBC # BLD AUTO: 0 K/UL
NRBC BLD-RTO: 0 /100 WBC
PLATELET # BLD AUTO: 329 K/UL (ref 164–446)
PMV BLD AUTO: 9.7 FL (ref 9–12.9)
POTASSIUM SERPL-SCNC: 3.8 MMOL/L (ref 3.6–5.5)
PROT SERPL-MCNC: 6.1 G/DL (ref 6–8.2)
RBC # BLD AUTO: 4.26 M/UL (ref 4.2–5.4)
SODIUM SERPL-SCNC: 136 MMOL/L (ref 135–145)
WBC # BLD AUTO: 7.4 K/UL (ref 4.8–10.8)

## 2018-04-12 PROCEDURE — 700111 HCHG RX REV CODE 636 W/ 250 OVERRIDE (IP): Performed by: HOSPITALIST

## 2018-04-12 PROCEDURE — 36415 COLL VENOUS BLD VENIPUNCTURE: CPT

## 2018-04-12 PROCEDURE — 700102 HCHG RX REV CODE 250 W/ 637 OVERRIDE(OP): Performed by: FAMILY MEDICINE

## 2018-04-12 PROCEDURE — A9270 NON-COVERED ITEM OR SERVICE: HCPCS | Performed by: FAMILY MEDICINE

## 2018-04-12 PROCEDURE — 700101 HCHG RX REV CODE 250: Performed by: HOSPITALIST

## 2018-04-12 PROCEDURE — 700102 HCHG RX REV CODE 250 W/ 637 OVERRIDE(OP): Performed by: HOSPITALIST

## 2018-04-12 PROCEDURE — 85025 COMPLETE CBC W/AUTO DIFF WBC: CPT

## 2018-04-12 PROCEDURE — 99239 HOSP IP/OBS DSCHRG MGMT >30: CPT | Performed by: FAMILY MEDICINE

## 2018-04-12 PROCEDURE — A9270 NON-COVERED ITEM OR SERVICE: HCPCS | Performed by: HOSPITALIST

## 2018-04-12 PROCEDURE — 80053 COMPREHEN METABOLIC PANEL: CPT

## 2018-04-12 RX ORDER — CALCIUM CARBONATE 500 MG/1
500 TABLET, CHEWABLE ORAL 2 TIMES DAILY PRN
Status: DISCONTINUED | OUTPATIENT
Start: 2018-04-12 | End: 2018-04-12 | Stop reason: HOSPADM

## 2018-04-12 RX ADMIN — OMEPRAZOLE 20 MG: 20 CAPSULE, DELAYED RELEASE ORAL at 09:27

## 2018-04-12 RX ADMIN — ANTACID TABLETS 500 MG: 500 TABLET, CHEWABLE ORAL at 12:21

## 2018-04-12 RX ADMIN — TRAMADOL HYDROCHLORIDE 50 MG: 50 TABLET, COATED ORAL at 06:34

## 2018-04-12 RX ADMIN — PROCHLORPERAZINE EDISYLATE 10 MG: 5 INJECTION INTRAMUSCULAR; INTRAVENOUS at 06:48

## 2018-04-12 RX ADMIN — NICOTINE POLACRILEX 2 MG: 2 GUM, CHEWING BUCCAL at 05:42

## 2018-04-12 RX ADMIN — POTASSIUM CHLORIDE AND SODIUM CHLORIDE: 900; 150 INJECTION, SOLUTION INTRAVENOUS at 05:42

## 2018-04-12 NOTE — PROGRESS NOTES
Noticed pt laying in bed w/ emesis bag close to face, pt says she is only nauseous because of the procedure being done on her roommates foot (blood and smell). No nausea medications given, pt says it is not from her lunch and really wants to go home. 30-40% of lunch eaten, beginning discharge paperwork.

## 2018-04-12 NOTE — PROGRESS NOTES
Pt a&Ox4, expresses some nausea, requests zofran. Refuses senna as she has not eaten much for a few days. Pt takes shower, steady gait. Pt back to bed, around 2140 pt attempts to drink 7up, states it is too sweet and starts to vomit. Pt medicated with compazine per MD order.

## 2018-04-12 NOTE — PROGRESS NOTES
Pt A&O x4, no c/o pain, no current c/o n/v, diet upgraded to GI soft, ate approximately 35% of breakfast. No nausea or vomiting after eating, however did begin to have heartburn, tums given. Up to shower before lunch, sleeping most of the day. Pt also reports diarrhea this afternoon. Will evaluate pt's tolerance of lunch if no issues Dr. Merrill comfortable w/ pt discharging.     Katherine Will: No fall risk, call light always w/in reach, rounding hourly

## 2018-04-12 NOTE — CARE PLAN
Problem: Bowel/Gastric:  Goal: Will not experience complications related to bowel motility  Outcome: PROGRESSING AS EXPECTED  Pt has had some diarrhea this morning, has not been taking scheduled Senna, however, pt's diet increased to GI soft, thinking the start of eating solid foods may be causing the diarrhea. No nausea and vomiting from eating reported    Problem: Discharge Barriers/Planning  Goal: Patient's continuum of care needs will be met    Intervention: Explain discharge instructions and medication reconcilliation to patient and significant other/support system  Pt signed and given discharge instructions, aware of f/u w/ PCP outpt. and what s/s to look for in the future

## 2018-04-12 NOTE — CARE PLAN
Problem: Venous Thromboembolism (VTW)/Deep Vein Thrombosis (DVT) Prevention:  Goal: Patient will participate in Venous Thrombosis (VTE)/Deep Vein Thrombosis (DVT)Prevention Measures  Outcome: PROGRESSING AS EXPECTED  Pt wears SCDs while resting in bed     Problem: Bowel/Gastric:  Goal: Will not experience complications related to bowel motility  Outcome: PROGRESSING SLOWER THAN EXPECTED  Pt has one episode of emesis, still experiencing nausea

## 2018-04-12 NOTE — PROGRESS NOTES
Pt signed and given discharge orders. Aware of her f/u appt w/ PCP in two weeks. Pt PIV removed, dressed self in street clothes. Walked off of unit w/ all belongings in hand.

## 2018-04-12 NOTE — DISCHARGE SUMMARY
CHIEF COMPLAINT ON ADMISSION  Chief Complaint   Patient presents with   • Vomiting     x 3 days, has been using phenergan suppositories without relief   • Vaginal Bleeding     Hx same pain occurring during pt's last period.        CODE STATUS  Full Code    HPI & HOSPITAL COURSE  This is a 33 y.o. female here with  nausea and vomiting. The patient stated that her symptoms have been ongoing for the last 3 days.  She was placed on iv fluid and was given zofran.she was noted to be dehydrated as well.she had abd ct that showed:1.  No evidence of bowel obstruction or focal inflammatory change.    2.  Prior cholecystectomy.    3.  Bilateral ovarian follicles.  She feels better and if she tolerates lunch she will be discharged home.pt stated that she felt better this morning.    The patient recovered much more quickly than anticipated on admission.    Therefore, she is discharged in good and stable condition with close outpatient follow-up.    SPECIFIC OUTPATIENT FOLLOW-UP  pcp this coming week    DISCHARGE PROBLEM LIST  Principal Problem:    Intractable nausea and vomiting POA: Yes  Active Problems:    Hypothyroidism due to acquired atrophy of thyroid POA: Yes    Tobacco abuse POA: Unknown    Psychiatric disorder POA: Yes  Resolved Problems:    * No resolved hospital problems. *      FOLLOW UP  No future appointments.  No follow-up provider specified.    MEDICATIONS ON DISCHARGE  There are no discharge medications for this patient.       DIET  Orders Placed This Encounter   Procedures   • DIET ORDER     Standing Status:   Standing     Number of Occurrences:   1     Order Specific Question:   Diet:     Answer:   Low Fiber(GI Soft) [2]       ACTIVITY  As tolerated.  Weight bearing as tolerated      CONSULTATIONS  none    PROCEDURES  none    LABORATORY  Lab Results   Component Value Date/Time    SODIUM 136 04/12/2018 03:39 AM    POTASSIUM 3.8 04/12/2018 03:39 AM    CHLORIDE 105 04/12/2018 03:39 AM    CO2 26 04/12/2018 03:39  AM    GLUCOSE 98 04/12/2018 03:39 AM    BUN 6 (L) 04/12/2018 03:39 AM    CREATININE 0.59 04/12/2018 03:39 AM    CREATININE 0.9 03/08/2009 12:07 PM        Lab Results   Component Value Date/Time    WBC 7.4 04/12/2018 03:39 AM    HEMOGLOBIN 10.9 (L) 04/12/2018 03:39 AM    HEMATOCRIT 34.3 (L) 04/12/2018 03:39 AM    PLATELETCT 329 04/12/2018 03:39 AM        Total time of the discharge process exceeds 36 minutes

## 2018-04-12 NOTE — DISCHARGE INSTRUCTIONS
Discharge Instructions    Discharged to home by car with friend. Discharged via walking, hospital escort: Refused.  Special equipment needed: Not Applicable    Be sure to schedule a follow-up appointment with your primary care doctor or any specialists as instructed.     Discharge Plan:   Influenza Vaccine Indication: Patient Refuses    I understand that a diet low in cholesterol, fat, and sodium is recommended for good health. Unless I have been given specific instructions below for another diet, I accept this instruction as my diet prescription.   Other diet: GI SOft    Special Instructions: None    · Is patient discharged on Warfarin / Coumadin?   No     Depression / Suicide Risk    As you are discharged from this [facility] facility, it is important to learn how to keep safe from harming yourself.    Recognize the warning signs:  · Abrupt changes in personality, positive or negative- including increase in energy   · Giving away possessions  · Change in eating patterns- significant weight changes-  positive or negative  · Change in sleeping patterns- unable to sleep or sleeping all the time   · Unwillingness or inability to communicate  · Depression  · Unusual sadness, discouragement and loneliness  · Talk of wanting to die  · Neglect of personal appearance   · Rebelliousness- reckless behavior  · Withdrawal from people/activities they love  · Confusion- inability to concentrate     If you or a loved one observes any of these behaviors or has concerns about self-harm, here's what you can do:  · Talk about it- your feelings and reasons for harming yourself  · Remove any means that you might use to hurt yourself (examples: pills, rope, extension cords, firearm)  · Get professional help from the community (Mental Health, Substance Abuse, psychological counseling)  · Do not be alone:Call your Safe Contact- someone whom you trust who will be there for you.  · Call your local CRISIS HOTLINE 057-2806 or  329.679.4739  · Call your local Children's Mobile Crisis Response Team [place] (691) 893-6749 or www.Shoppable  · Call the toll free National Suicide Prevention Hotlines   · National Suicide Prevention Lifeline 481-377-XNJH (6405)  · National Hope Line Network 800-SUICIDE (679-7159)    Nausea and Vomiting, Adult  Introduction  Feeling sick to your stomach (nausea) means that your stomach is upset or you feel like you have to throw up (vomit). Feeling more and more sick to your stomach can lead to throwing up. Throwing up happens when food and liquid from your stomach are thrown up and out the mouth. Throwing up can make you feel weak and cause you to get dehydrated. Dehydration can make you tired and thirsty, make you have a dry mouth, and make it so you pee (urinate) less often. Older adults and people with other diseases or a weak defense system (immune system) are at higher risk for dehydration. If you feel sick to your stomach or if you throw up, it is important to follow instructions from your doctor about how to take care of yourself.  Follow these instructions at home:  Eating and drinking  Follow these instructions as told by your doctor:  · Take an oral rehydration solution (ORS). This is a drink that is sold at pharmacies and stores.  · Drink clear fluids in small amounts as you are able, such as:  ¨ Water.  ¨ Ice chips.  ¨ Diluted fruit juice.  ¨ Low-calorie sports drinks.  · Eat bland, easy-to-digest foods in small amounts as you are able, such as:  ¨ Bananas.  ¨ Applesauce.  ¨ Rice.  ¨ Low-fat (lean) meats.  ¨ Toast.  ¨ Crackers.  · Avoid fluids that have a lot of sugar or caffeine in them.  · Avoid alcohol.  · Avoid spicy or fatty foods.  General instructions  · Drink enough fluid to keep your pee (urine) clear or pale yellow.  · Wash your hands often. If you cannot use soap and water, use hand .  · Make sure that all people in your home wash their hands well and often.  · Take  over-the-counter and prescription medicines only as told by your doctor.  · Rest at home while you get better.  · Watch your condition for any changes.  · Breathe slowly and deeply when you feel sick to your stomach.  · Keep all follow-up visits as told by your doctor. This is important.  Contact a doctor if:  · You have a fever.  · You cannot keep fluids down.  · Your symptoms get worse.  · You have new symptoms.  · You feel sick to your stomach for more than two days.  · You feel light-headed or dizzy.  · You have a headache.  · You have muscle cramps.  Get help right away if:  · You have pain in your chest, neck, arm, or jaw.  · You feel very weak or you pass out (faint).  · You throw up again and again.  · You see blood in your throw-up.  · Your throw-up looks like black coffee grounds.  · You have bloody or black poop (stools) or poop that look like tar.  · You have a very bad headache, a stiff neck, or both.  · You have a rash.  · You have very bad pain, cramping, or bloating in your belly (abdomen).  · You have trouble breathing.  · You are breathing very quickly.  · Your heart is beating very quickly.  · Your skin feels cold and clammy.  · You feel confused.  · You have pain when you pee.  · You have signs of dehydration, such as:  ¨ Dark pee, hardly any pee, or no pee.  ¨ Cracked lips.  ¨ Dry mouth.  ¨ Sunken eyes.  ¨ Sleepiness.  ¨ Weakness.  These symptoms may be an emergency. Do not wait to see if the symptoms will go away. Get medical help right away. Call your local emergency services (911 in the [place] Do not drive yourself to the hospital.   This information is not intended to replace advice given to you by your health care provider. Make sure you discuss any questions you have with your health care provider.  Document Released: 06/05/2009 Document Revised: 07/07/2017 Document Reviewed: 08/23/2016  © 2017 [employer]

## 2018-08-03 ENCOUNTER — HOSPITAL ENCOUNTER (EMERGENCY)
Facility: MEDICAL CENTER | Age: 33
End: 2018-08-04
Attending: EMERGENCY MEDICINE

## 2018-08-03 DIAGNOSIS — F15.10 METHAMPHETAMINE ABUSE (HCC): ICD-10-CM

## 2018-08-03 DIAGNOSIS — F23 ACUTE PSYCHOSIS (HCC): ICD-10-CM

## 2018-08-03 LAB
AMPHET UR QL SCN: POSITIVE
BARBITURATES UR QL SCN: NEGATIVE
BENZODIAZ UR QL SCN: NEGATIVE
BZE UR QL SCN: NEGATIVE
CANNABINOIDS UR QL SCN: POSITIVE
HCG UR QL: NEGATIVE
METHADONE UR QL SCN: NEGATIVE
OPIATES UR QL SCN: NEGATIVE
OXYCODONE UR QL SCN: NEGATIVE
PCP UR QL SCN: NEGATIVE
POC BREATHALIZER: 0.01 PERCENT (ref 0–0.01)
PROPOXYPH UR QL SCN: NEGATIVE
SP GR UR REFRACTOMETRY: 1.02

## 2018-08-03 PROCEDURE — 80307 DRUG TEST PRSMV CHEM ANLYZR: CPT

## 2018-08-03 PROCEDURE — 96372 THER/PROPH/DIAG INJ SC/IM: CPT

## 2018-08-03 PROCEDURE — 302970 POC BREATHALIZER: Performed by: EMERGENCY MEDICINE

## 2018-08-03 PROCEDURE — 81025 URINE PREGNANCY TEST: CPT

## 2018-08-03 PROCEDURE — 700111 HCHG RX REV CODE 636 W/ 250 OVERRIDE (IP): Performed by: EMERGENCY MEDICINE

## 2018-08-03 PROCEDURE — 99285 EMERGENCY DEPT VISIT HI MDM: CPT

## 2018-08-03 RX ORDER — HALOPERIDOL 5 MG/ML
5 INJECTION INTRAMUSCULAR EVERY 4 HOURS PRN
Status: DISCONTINUED | OUTPATIENT
Start: 2018-08-03 | End: 2018-08-04 | Stop reason: HOSPADM

## 2018-08-03 RX ORDER — LORAZEPAM 2 MG/ML
2 INJECTION INTRAMUSCULAR EVERY 4 HOURS PRN
Status: DISCONTINUED | OUTPATIENT
Start: 2018-08-03 | End: 2018-08-04 | Stop reason: HOSPADM

## 2018-08-03 RX ADMIN — HALOPERIDOL LACTATE 5 MG: 5 INJECTION, SOLUTION INTRAMUSCULAR at 20:25

## 2018-08-03 ASSESSMENT — PAIN SCALES - GENERAL: PAINLEVEL_OUTOF10: 0

## 2018-08-04 VITALS
DIASTOLIC BLOOD PRESSURE: 68 MMHG | BODY MASS INDEX: 30.66 KG/M2 | OXYGEN SATURATION: 95 % | RESPIRATION RATE: 17 BRPM | TEMPERATURE: 98.3 F | SYSTOLIC BLOOD PRESSURE: 118 MMHG | WEIGHT: 184 LBS | HEART RATE: 68 BPM | HEIGHT: 65 IN

## 2018-08-04 PROCEDURE — 90791 PSYCH DIAGNOSTIC EVALUATION: CPT

## 2018-08-04 NOTE — ED PROVIDER NOTES
ED Provider Note Addendum    Scribed for MAVERICK REECE by Prince Ellis on 2018 at 9:15 AM.     This is an addendum to the note on this patient.  For further details and full chart information, see the previously signed ED Provider Note written by Dr. Hernandez ([facility]).      MEDICAL DECISION MAKIN:15 AM - Patient was transferred into my care from Dr. Hernandez (ERP).    9:16 AM - Reviewed patient's past medical records which show the patient has a history of bipolar anxiety and methamphetamine abuse. Patient was agitated at approximately 6PM yesterday night. Patient received Haldol and Ativan. Patient has slept for approximately 14 hours and is now awake and alert. Urine Tox screen results are positive for methamphetamines.     9:23 AM - Reevaluated patient at this time. Patient appears disheveled and does not appear psychotic. She denies suicidal or homicidal ideations.      Patient apparently got intoxicated and then took methamphetamines which resulted in erratic behavior.  The patient had to be given medications to calm the patient down, the patient slept for approximately 14-15 hours, the patient is now awake and alert acting appropriate.  The patient denies any suicidal homicidal ideation, is able to take care of herself, patient was seen and evaluated by the alert team, we believe the patient is safe for discharge home.  We will discharge the patient home.      FINAL IMPRESSION  1. Acute psychosis    2. Methamphetamine abuse        The note accurately reflects work and decisions made by me.  Maverick Reece  2018  1:36 PM

## 2018-08-04 NOTE — DISCHARGE INSTRUCTIONS
Amphetamine Abuse  Meth and other amphetamines over-stimulate the nervous system. This gives you a false feeling of power and confidence. Amphetamines once came in the form of diet pills. This is no longer considered a valid reason to use the drug. More often they are bought as the illegal drug, methamphetamine. It is also known as crank, crystal, speed, or ice. Meth and similar drugs can cause a variety of problems. They can cause severe physical and psychological problems.  SYMPTOMS   · Reduced appetite.  · Dry mouth.  · Erectile dysfunction.  · Headache.  · Fever and sweating.  · Diarrhea or constipation.  · Blurred vision and impaired speech.  · Dizziness, uncontrollable movements or shaking.  · Restlessness.  · Palpitations and irregular heartbeat.  · Anxiety and/or general nervousness.  Long-term problems:  · Convulsions.  · Heart attack.  · Poor skin color.  · A mental state that mimics serious psychiatric illness.  · Emotional instability.  · Aggression.  · Dry or itchy skin.  · Acne.  RISKS AND COMPLICATIONS  Risks associated with needle use and inhaling include:  · Infection.  · Vein damage.  · Overdose.  · Skin abscesses.  · Hepatitis.  · AIDS.  TREATMENT   There are 2 types:   · Short-term medical treatment. This helps to preserve life and prevent or minimize damage from the problems described above.  · Long-term substance abuse treatment. This helps to achieve recovery from drug abuse or addiction.  HOME CARE INSTRUCTIONS   After treatment discharge, it is essential to do the following:  · Follow all instructions from your caregiver very carefully.  · Take all medications as prescribed. If you cannot, contact your caregiver right away.  · Keep all appointments for further evaluation and counseling.  · Do not use drugs, alcohol or any other mind and mood altering drugs unless prescribed by your doctor.  · Do not operate a motor vehicle or machinery until your caregiver says it is OK.  SEEK IMMEDIATE  MEDICAL CARE IF:   · You have a seizure (convulsions).  · You become very shaky or tense.  · You become light headed or faint.  · You notice sudden or gradual weakness on one side of the body or an arm or leg, or are unable to walk.  · You have a sudden, severe headache, blurred vision or high fever.  · You develop chest pain, nausea or vomiting.  If you have any of the above symptoms, DO NOT DRIVE. Have someone else drive you or call your local emergency services (911 in [place] for help.  Document Released: 01/25/2006 Document Revised: 03/11/2013 Document Reviewed: 03/15/2011  [employer] (PICA)® Patient Information ©2014 [employer] (PICA), [employer].

## 2018-08-04 NOTE — ED NOTES
Urine collected by juliocesar cath as pt cannot follow commands.   Restriants down to 2 points.   1 bag of belonging obtained. Pt now in hospital gown

## 2018-08-04 NOTE — ED NOTES
Community  at bedside to serve her with paperwork. The order has been placed in patient personal belongings. Per community , documents are private and should not be placed in patient chart.

## 2018-08-04 NOTE — ED PROVIDER NOTES
ED Provider Note    CHIEF COMPLAINT  Chief Complaint   Patient presents with   • Psych Eval   • Hallucinations       HPI  Kaylie Burciaga is a 33 y.o. female who presents for evaluation of altered mental status auditory and visual hallucinations erratic behavior. The patient has a history of psychosis with bipolar. Her mother checked on her and she was apparently acting quite erratically talking to people that were not there. She denies drugs or alcohol recently. She was quite altered and required 4. restraints on arrival. There is no report of any trauma. She denies pregnancy. History is challenging obtained because the patient was quite erratic REVIEW OF SYSTEMS  See HPI for further details. Unavailable All other systems are negative.     PAST MEDICAL HISTORY  Past Medical History:   Diagnosis Date   • Hypothyroidism due to acquired atrophy of thyroid 5/31/2016   • Nausea & vomiting 1/15/2012   • Blood transfusion 2010    Gallbladder removed and had something punctured   • Anxiety and depression    • ASTHMA     since birth, used albuterol as needed   • bipolar    • Depression     at age 11       FAMILY HISTORY  Unknown    SOCIAL HISTORY  Social History     Social History   • Marital status: Single     Spouse name: N/A   • Number of children: N/A   • Years of education: N/A     Social History Main Topics   • Smoking status: Current Every Day Smoker     Packs/day: 0.50     Years: 11.00     Types: Cigarettes   • Smokeless tobacco: Never Used      Comment:  Patient has cut down to less than half a pack a day.   • Alcohol use No      Comment: does not drink   • Drug use: No      Comment: Last smoked 1 year ago.   • Sexual activity: No      Comment: None     Other Topics Concern   • Not on file     Social History Narrative   • No narrative on file       SURGICAL HISTORY  Past Surgical History:   Procedure Laterality Date   • STEFAN BY LAPAROSCOPY  12/1/08    Performed by BELLA ROQUE at SURGERY [facility]  "ORS   • GASTROSCOPY WITH BIOPSY  11/29/08    Performed by GONZALES NAYAK at ENDOSCOPY [facility] ORS   • OTHER  T & A 5 yrs ago   • OTHER ABDOMINAL SURGERY     • TONSILLECTOMY      Age at 17       CURRENT MEDICATIONS  Home Medications    **Home medications have not yet been reviewed for this encounter**     No regular meds    ALLERGIES  Allergies   Allergen Reactions   • Penicillins Anaphylaxis   • Morphine Rash     Rash only       PHYSICAL EXAM  VITAL SIGNS: /52   Pulse 97   Temp 36.4 °C (97.6 °F)   Resp (!) 22   Ht 1.651 m (5' 5\")   Wt 83.5 kg (184 lb)   BMI 30.62 kg/m²  Room air O2: 99    Constitutional: Agitated disheveled  HENT: Normocephalic, Atraumatic, Bilateral external ears normal, Oropharynx moist, No oral exudates, Nose normal.   Eyes: PERRLA, EOMI, Conjunctiva normal, No discharge.   Neck: Normal range of motion, No tenderness, Supple, No stridor.   Lymphatic: No lymphadenopathy noted.   Cardiovascular: Normal heart rate, Normal rhythm, No murmurs, No rubs, No gallops.   Thorax & Lungs: Normal breath sounds, No respiratory distress, No wheezing, No chest tenderness.   Abdomen: Bowel sounds normal, Soft, No tenderness, No masses, No pulsatile masses.   Skin: Warm, Dry, No erythema, No rash.   Back: No tenderness, No CVA tenderness.   Extremities: Intact distal pulses, No edema, No tenderness, No cyanosis, No clubbing.   Musculoskeletal: Good range of motion in all major joints. No tenderness to palpation or major deformities noted.   Neurologic: Oriented to self. Patient is in 4. restraints extremities no seizure activity Psychiatric: Agitated patient appears to be actively hallucinating, tangential thought processes cannot assess for suicidality    Results for orders placed or performed during the hospital encounter of 08/03/18   BETA-HCG QUALITATIVE URINE   Result Value Ref Range    Beta-Hcg Urine Negative Negative   REFRACTOMETER SG   Result Value Ref Range    Specific Gravity 1.024 "    POC BREATHALIZER   Result Value Ref Range    POC Breathalizer 0.01 0.00 - 0.01 Percent        COURSE & MEDICAL DECISION MAKING  Pertinent Labs & Imaging studies reviewed. (See chart for details)  I feel that the patient is medically clear. She appears to be acutely psychotic we were able to get her out of 4 point restraints down to 2 point restraints. I placed her on a legalhold. I will write for when necessary intramuscular Haldol and Ativan as needed    FINAL IMPRESSION  1. Acute psychosis       Electronically signed by: Philip Hernandez, 8/3/2018 6:34 PM
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
## 2018-08-04 NOTE — ED NOTES
Patient verbalizes understanding of dc and follow up, no acute distress noted, patient stable for discharge. Resource list given to patient.

## 2018-08-04 NOTE — ED NOTES
Patient sleeping in bed. Repositioning self as needed. NAD. Respirations even and unlabored. Sitter in hallway.
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
## 2018-08-26 ENCOUNTER — HOSPITAL ENCOUNTER (EMERGENCY)
Facility: MEDICAL CENTER | Age: 33
End: 2018-08-26
Attending: EMERGENCY MEDICINE

## 2018-08-26 VITALS
RESPIRATION RATE: 18 BRPM | OXYGEN SATURATION: 96 % | BODY MASS INDEX: 30.73 KG/M2 | HEIGHT: 64 IN | HEART RATE: 72 BPM | TEMPERATURE: 98.4 F | WEIGHT: 180 LBS | DIASTOLIC BLOOD PRESSURE: 74 MMHG | SYSTOLIC BLOOD PRESSURE: 125 MMHG

## 2018-08-26 DIAGNOSIS — F16.10 LYSERGIC ACID DIETHYLAMIDE (LSD) ABUSE (HCC): ICD-10-CM

## 2018-08-26 DIAGNOSIS — R44.3 HALLUCINATIONS: ICD-10-CM

## 2018-08-26 DIAGNOSIS — F15.10 METHAMPHETAMINE ABUSE (HCC): ICD-10-CM

## 2018-08-26 LAB
ALBUMIN SERPL BCP-MCNC: 3.8 G/DL (ref 3.2–4.9)
ALBUMIN/GLOB SERPL: 1.1 G/DL
ALP SERPL-CCNC: 71 U/L (ref 30–99)
ALT SERPL-CCNC: 17 U/L (ref 2–50)
AMPHET UR QL SCN: POSITIVE
ANION GAP SERPL CALC-SCNC: 9 MMOL/L (ref 0–11.9)
ANISOCYTOSIS BLD QL SMEAR: ABNORMAL
APAP SERPL-MCNC: <10 UG/ML (ref 10–30)
AST SERPL-CCNC: 21 U/L (ref 12–45)
BARBITURATES UR QL SCN: NEGATIVE
BASOPHILS # BLD AUTO: 1.7 % (ref 0–1.8)
BASOPHILS # BLD: 0.11 K/UL (ref 0–0.12)
BENZODIAZ UR QL SCN: NEGATIVE
BILIRUB SERPL-MCNC: 0.5 MG/DL (ref 0.1–1.5)
BUN SERPL-MCNC: 7 MG/DL (ref 8–22)
BZE UR QL SCN: NEGATIVE
CALCIUM SERPL-MCNC: 9.7 MG/DL (ref 8.5–10.5)
CANNABINOIDS UR QL SCN: POSITIVE
CHLORIDE SERPL-SCNC: 102 MMOL/L (ref 96–112)
CO2 SERPL-SCNC: 25 MMOL/L (ref 20–33)
CREAT SERPL-MCNC: 0.73 MG/DL (ref 0.5–1.4)
EOSINOPHIL # BLD AUTO: 0.11 K/UL (ref 0–0.51)
EOSINOPHIL NFR BLD: 1.7 % (ref 0–6.9)
ERYTHROCYTE [DISTWIDTH] IN BLOOD BY AUTOMATED COUNT: 53.8 FL (ref 35.9–50)
ETHANOL BLD-MCNC: 0.01 G/DL
GLOBULIN SER CALC-MCNC: 3.4 G/DL (ref 1.9–3.5)
GLUCOSE SERPL-MCNC: 96 MG/DL (ref 65–99)
HCT VFR BLD AUTO: 32.9 % (ref 37–47)
HGB BLD-MCNC: 9.8 G/DL (ref 12–16)
LIPASE SERPL-CCNC: 7 U/L (ref 11–82)
LYMPHOCYTES # BLD AUTO: 1.23 K/UL (ref 1–4.8)
LYMPHOCYTES NFR BLD: 18.3 % (ref 22–41)
MANUAL DIFF BLD: NORMAL
MCH RBC QN AUTO: 23.2 PG (ref 27–33)
MCHC RBC AUTO-ENTMCNC: 29.8 G/DL (ref 33.6–35)
MCV RBC AUTO: 78 FL (ref 81.4–97.8)
METHADONE UR QL SCN: NEGATIVE
MICROCYTES BLD QL SMEAR: ABNORMAL
MONOCYTES # BLD AUTO: 0.41 K/UL (ref 0–0.85)
MONOCYTES NFR BLD AUTO: 6.1 % (ref 0–13.4)
MORPHOLOGY BLD-IMP: NORMAL
NEUTROPHILS # BLD AUTO: 4.84 K/UL (ref 2–7.15)
NEUTROPHILS NFR BLD: 72.2 % (ref 44–72)
NRBC # BLD AUTO: 0 K/UL
NRBC BLD-RTO: 0 /100 WBC
OPIATES UR QL SCN: NEGATIVE
OXYCODONE UR QL SCN: NEGATIVE
PCP UR QL SCN: NEGATIVE
PLATELET # BLD AUTO: 470 K/UL (ref 164–446)
PMV BLD AUTO: 9.6 FL (ref 9–12.9)
POIKILOCYTOSIS BLD QL SMEAR: NORMAL
POLYCHROMASIA BLD QL SMEAR: NORMAL
POTASSIUM SERPL-SCNC: 3.8 MMOL/L (ref 3.6–5.5)
PROPOXYPH UR QL SCN: NEGATIVE
PROT SERPL-MCNC: 7.2 G/DL (ref 6–8.2)
RBC # BLD AUTO: 4.22 M/UL (ref 4.2–5.4)
RBC BLD AUTO: PRESENT
SALICYLATES SERPL-MCNC: 0 MG/DL (ref 15–25)
SODIUM SERPL-SCNC: 136 MMOL/L (ref 135–145)
STOMATOCYTES BLD QL SMEAR: NORMAL
WBC # BLD AUTO: 6.7 K/UL (ref 4.8–10.8)

## 2018-08-26 PROCEDURE — 80053 COMPREHEN METABOLIC PANEL: CPT

## 2018-08-26 PROCEDURE — 700111 HCHG RX REV CODE 636 W/ 250 OVERRIDE (IP): Performed by: EMERGENCY MEDICINE

## 2018-08-26 PROCEDURE — 99285 EMERGENCY DEPT VISIT HI MDM: CPT

## 2018-08-26 PROCEDURE — 83690 ASSAY OF LIPASE: CPT

## 2018-08-26 PROCEDURE — 93005 ELECTROCARDIOGRAM TRACING: CPT | Performed by: EMERGENCY MEDICINE

## 2018-08-26 PROCEDURE — 85007 BL SMEAR W/DIFF WBC COUNT: CPT

## 2018-08-26 PROCEDURE — 85027 COMPLETE CBC AUTOMATED: CPT

## 2018-08-26 PROCEDURE — 80307 DRUG TEST PRSMV CHEM ANLYZR: CPT

## 2018-08-26 PROCEDURE — 96372 THER/PROPH/DIAG INJ SC/IM: CPT

## 2018-08-26 RX ORDER — LORAZEPAM 2 MG/ML
1 INJECTION INTRAMUSCULAR ONCE
Status: COMPLETED | OUTPATIENT
Start: 2018-08-26 | End: 2018-08-26

## 2018-08-26 RX ADMIN — LORAZEPAM 1 MG: 2 INJECTION INTRAMUSCULAR; INTRAVENOUS at 11:59
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
## 2018-08-26 NOTE — ED NOTES
Pt given bus pass and fresh underwear. Pt verbalized understanding of discharge instructions. Escorted to lobby by Eliel ARNOLD

## 2018-08-26 NOTE — DISCHARGE INSTRUCTIONS
Return if you have any thoughts of hurting youreself or others. Stop using drugs.   Substance Abuse  Your exam indicates that you have a problem with substance abuse. Substance abuse is the misuse of alcohol or drugs that causes problems in family life, friendships, and work relationships. Substance abuse is the most important cause of premature illness, disability, and death in our society. It is also the greatest threat to a person's mental and spiritual well being.  Substance abuse can start out in an innocent way, such as social drinking or taking a little extra medication prescribed by your doctor. No one starts out with the intention of becoming an alcoholic or an addict. Substance abuse victims cannot control their use of alcohol or drugs. They may become intoxicated daily or go on weekend binges. Often there is a strong desire to quit, but attempts to stop using often fail. Encounters with law enforcement or conflicts with family members, friends, and work associates are signs of a potential problem.  Recovery is always possible, although the craving for some drugs makes it difficult to quit without assistance. Many treatment programs are available to help people stop abusing alcohol or drugs. The first step in treatment is to admit you have a problem. This is a major jet because denial is a powerful force with substance abuse.  Alcoholics Anonymous, Narcotics Anonymous, Cocaine Anonymous, and other recovery groups and programs can be very useful in helping people to quit. If you do not feel okay about your drug or alcohol use and if it is causing you trouble, we want to encourage you to talk about it with your doctor or with someone from a recovery group who can help you. You could also call the National [place] on Drug Abuse at 3-473-518-HELP. It is up to you to take the first step.  AL-ANON and ALA-TEEN are support groups for friends and family members of an alcohol or drug dependent person. The  people who love and care for the alcoholic or addicted person often need help, too. For information about these organizations, check your phone directory or call a local alcohol or drug treatment center.  Document Released: 01/25/2006 Document Revised: 03/11/2013 Document Reviewed: 12/19/2009  ExitCare® Patient Information ©2014 [employer].      Finding Treatment for Addiction  Introduction  WHAT IS ADDICTION?  Addiction is a complex disease of the brain. It causes an uncontrollable (compulsive) need for a substance. You can be addicted to alcohol, illegal drugs, or prescription medicines such as painkillers. Addiction can also be a behavior, like gambling or shopping. The need for the drug or activity can become so strong that you think about it all the time. You can also become physically dependent on a substance.  Addiction can change the way your brain works. Because of these changes, getting more of whatever you are addicted to becomes the most important thing to you and feels better than other activities or relationships. Addiction can lead to changes in health, behavior, emotions, relationships, and choices that affect you and everyone around you.  HOW DO I KNOW IF I NEED TREATMENT FOR ADDICTION?  Addiction is a progressive disease. Without treatment, addiction can get worse. Living with addiction puts you at higher risk for injury, poor health, lost employment, loss of money, and even death.  You might need treatment for addiction if:  · You have tried to stop or cut down, but you cannot.  · Your addiction is causing physical health problems.  · You find it annoying that your friends and family are concerned about your alcohol or substance use.  · You feel guilty about substance abuse or a compulsive behavior.  · You have lied or tried to hide your addiction.  · You need a particular substance or activity to start your day or to calm down.  · You are getting in trouble at school, work, home, or with the  police.  · You have done something illegal to support your addiction.  · You are running out of money because of your addiction.  · You have no time for anything other than your addiction.  WHAT TYPES OF TREATMENT ARE AVAILABLE?  The treatment program that is right for you will depend on many factors, including the type of addiction you have. Treatment programs can be outpatient or inpatient. In an outpatient program, you live at home and go to work or school, but you also go to a clinic for treatment. With an inpatient program, you live and sleep at the program facility during treatment. After treatment, you might need a plan for support during recovery. Other treatment options include:  · Medicine.  ¨ Some addictions may be treated with prescription medicines.  ¨ You might also need medicine to treat anxiety or depression.  · Counseling and behavior therapy. Therapy can help individuals and families behave in healthier ways and relate more effectively.  · Support groups. Confidential group therapy, such as a 12-step program, can help individuals and families during treatment and recovery.  No single type of program is right for everyone. Many treatment programs involve a combination of education, counseling, and a 12-step, spiritually-based approach. Some treatment programs are government sponsored. They are geared for patients who do not have private insurance. Treatment programs can vary in many respects, such as:  · Cost and types of insurance that are accepted.  · Types of on-site medical services that are offered.  · Length of stay, setting, and size.  · Overall philosophy of treatment.  WHAT SHOULD I CONSIDER WHEN SELECTING A TREATMENT PROGRAM?  It is important to think about your individual requirements when selecting a treatment program. There are a number of things to consider, such as:  · If the program is certified by the appropriate government agency. Even private programs must be certified and employ  certified professionals.  · If the program is covered by your insurance. If finances are a concern, the first call you should make is to your insurance company, if you have health insurance. Ask for a list of treatment programs that are in your network, and confirm any copayments and deductibles that you may have to pay.  ¨ If you do not have insurance, or if you choose to attend a program that does not accept your insurance, discuss whether a payment plan can be set up.  · If treatment is available in languages other than English, if needed.  · If the program offers detoxification treatment, if needed.  · If 12-step meetings are held at the center or if transport is available for patients to attend meetings at other locations.  · If the program is professional, organized, and clean.  · If the program meets all of your needs, including physical and cultural needs.  · If the facility offers specific treatment for your particular addiction.  · If support continues to be offered after you have left the program.  · If your treatment plan is continually looked at to make sure you are receiving the right treatment at the right time.  · If mental health counseling is part of your treatment.  · If medicine is included in treatment, if needed.  · If your family is included in your treatment plan and if support is offered to them throughout the treatment process.  · How the treatment works to prevent relapse.  WHERE ELSE CAN I GET HELP?  · Your health care provider. Ask him or her to help you find addiction treatment. These discussions are confidential.  · The National [place] on Alcoholism and Drug Dependence (NCADD). This group has information about treatment centers and programs for people who have an addiction and for family members.  ¨ The telephone number is 6-878-JVU-call (1-323.235.2420).  ¨ The website is https://ncadd.org/about-ncadd/our-affiliates  · The Substance Abuse and Mental Health Services Administration  ([facility]). This group will help you find publicly funded treatment centers, help hotlines, and counseling services near you.  ¨ The telephone number is 1-800-662-help (1-927.546.4253).  ¨ The website is www.findtreatment.[facility].gov  In countries outside of the [place] and [place], look in local directories for contact information for services in your area.  This information is not intended to replace advice given to you by your health care provider. Make sure you discuss any questions you have with your health care provider.  Document Released: 11/16/2006 Document Revised: 05/25/2017 Document Reviewed: 10/06/2015  © 2017 Elsevier

## 2018-08-26 NOTE — ED PROVIDER NOTES
"ED Provider Note    Scribed for Ryland Diez M.D. by Karlos Bell. 8/26/2018, 11:34 AM.    Primary care provider: [facility]  Means of arrival: Ambulance  History obtained from: Patient  History limited by: Patient is a poor historian secondary to hallucinations    CHIEF COMPLAINT  Chief Complaint   Patient presents with   • Drug Ingestion     BIB EMS after pt was found wandering shirtless in street. reports someone gave her something to like out of her hands. pt hyperactive with flight of ideas, seemingly hallucinating. answers A&O questions with direction.    • Hallucinations       HPI  Kaylie Burciaga is a 33 y.o. female who has a medical history of asthma presents to the Emergency Department for evaluation of visual hallucinations onset today. She describes her hallucinations as seeing flowers and colors. She has associated finger pain described as \"shocks.\" She reports she was at [employer] [place] and [place] drugs, states might have been LSD. She normally uses drugs such as amphetamines.     HPI limited to patient is a poor historian secondary to hallucinations    REVIEW OF SYSTEMS  Pertinent positives include visual hallucinations and finger pain. All other systems negative. C.    ROS limited to patient is a poor historian secondary to hallucinations    PAST MEDICAL HISTORY   has a past medical history of Anxiety and depression; ASTHMA; bipolar; Blood transfusion (2010); Depression; Hypothyroidism due to acquired atrophy of thyroid (5/31/2016); and Nausea & vomiting (1/15/2012).    SURGICAL HISTORY   has a past surgical history that includes gastroscopy with biopsy (11/29/08); other (T & A 5 yrs ago); other abdominal surgery; tonsillectomy; and marco by laparoscopy (12/1/08).    SOCIAL HISTORY  Social History   Substance Use Topics   • Smoking status: Current Every Day Smoker     Packs/day: 0.50     Years: 11.00     Types: Cigarettes   • Smokeless tobacco: Never Used      Comment:  " "Patient has cut down to less than half a pack a day.   • Alcohol use No      Comment: does not drink      History   Drug Use No     Comment: Last smoked 1 year ago.       FAMILY HISTORY  Family History   Problem Relation Age of Onset   • Diabetes Mother         Insulin   • Hypertension Mother    • Alcohol/Drug Mother        CURRENT MEDICATIONS  No current facility-administered medications on file prior to encounter.      No current outpatient prescriptions on file prior to encounter.      ALLERGIES  Allergies   Allergen Reactions   • Penicillins Anaphylaxis   • Morphine Rash     Rash only       PHYSICAL EXAM  VITAL SIGNS: /60   Pulse 82   Temp 36.9 °C (98.4 °F)   Resp 18   Ht 1.626 m (5' 4\")   Wt 81.6 kg (180 lb)   SpO2 96%   BMI 30.90 kg/m²     Constitutional: Well developed, Well nourished, mild distress.   HENT: Poor dentition, Normocephalic, Atraumatic, Oropharynx moist.   Eyes: Conjunctiva normal, No discharge.   Cardiovascular: Normal heart rate, Normal rhythm, No murmurs, equal pulses.   Pulmonary: Normal breath sounds, No respiratory distress, No wheezing, No rales, No rhonchi.  Chest: No chest wall tenderness or deformity.   Abdomen:Soft, No tenderness, No masses, no rebound, no guarding.   Back: No CVA tenderness.   Musculoskeletal: No major deformities noted, No tenderness.   Skin: Scabbing of fingers secondary to picking, Warm, Dry, No erythema, No rash.   Neurologic: Alert & oriented x 3, Normal motor function,  No focal deficits noted.   Psychiatric: Rambling thoughts with stating hallucinations of flowers and colors, thinks it might have been LSD, denies suicidal or homicidal ideations    LABS  Results for orders placed or performed during the hospital encounter of 08/26/18   URINE DRUG SCREEN (TRIAGE)   Result Value Ref Range    Amphetamines Urine Positive (A) Negative    Barbiturates Negative Negative    Benzodiazepines Negative Negative    Cocaine Metabolite Negative Negative    " Methadone Negative Negative    Opiates Negative Negative    Oxycodone Negative Negative    Phencyclidine -Pcp Negative Negative    Propoxyphene Negative Negative    Cannabinoid Metab Positive (A) Negative   Blood Alcohol   Result Value Ref Range    Diagnostic Alcohol 0.01 (H) 0.00 g/dL   CBC WITH DIFFERENTIAL   Result Value Ref Range    WBC 6.7 4.8 - 10.8 K/uL    RBC 4.22 4.20 - 5.40 M/uL    Hemoglobin 9.8 (L) 12.0 - 16.0 g/dL    Hematocrit 32.9 (L) 37.0 - 47.0 %    MCV 78.0 (L) 81.4 - 97.8 fL    MCH 23.2 (L) 27.0 - 33.0 pg    MCHC 29.8 (L) 33.6 - 35.0 g/dL    RDW 53.8 (H) 35.9 - 50.0 fL    Platelet Count 470 (H) 164 - 446 K/uL    MPV 9.6 9.0 - 12.9 fL    Neutrophils-Polys 72.20 (H) 44.00 - 72.00 %    Lymphocytes 18.30 (L) 22.00 - 41.00 %    Monocytes 6.10 0.00 - 13.40 %    Eosinophils 1.70 0.00 - 6.90 %    Basophils 1.70 0.00 - 1.80 %    Nucleated RBC 0.00 /100 WBC    Neutrophils (Absolute) 4.84 2.00 - 7.15 K/uL    Lymphs (Absolute) 1.23 1.00 - 4.80 K/uL    Monos (Absolute) 0.41 0.00 - 0.85 K/uL    Eos (Absolute) 0.11 0.00 - 0.51 K/uL    Baso (Absolute) 0.11 0.00 - 0.12 K/uL    NRBC (Absolute) 0.00 K/uL    Anisocytosis 1+     Microcytosis 1+    COMP METABOLIC PANEL   Result Value Ref Range    Sodium 136 135 - 145 mmol/L    Potassium 3.8 3.6 - 5.5 mmol/L    Chloride 102 96 - 112 mmol/L    Co2 25 20 - 33 mmol/L    Anion Gap 9.0 0.0 - 11.9    Glucose 96 65 - 99 mg/dL    Bun 7 (L) 8 - 22 mg/dL    Creatinine 0.73 0.50 - 1.40 mg/dL    Calcium 9.7 8.5 - 10.5 mg/dL    AST(SGOT) 21 12 - 45 U/L    ALT(SGPT) 17 2 - 50 U/L    Alkaline Phosphatase 71 30 - 99 U/L    Total Bilirubin 0.5 0.1 - 1.5 mg/dL    Albumin 3.8 3.2 - 4.9 g/dL    Total Protein 7.2 6.0 - 8.2 g/dL    Globulin 3.4 1.9 - 3.5 g/dL    A-G Ratio 1.1 g/dL   LIPASE   Result Value Ref Range    Lipase 7 (L) 11 - 82 U/L   Acetaminophen Level   Result Value Ref Range    Acetaminophen -Tylenol <10 10 - 30 ug/mL   SALICYLATE LEVEL   Result Value Ref Range    Salicylates,  Quant. 0 (L) 15 - 25 mg/dL   DIFFERENTIAL MANUAL   Result Value Ref Range    Manual Diff Status PERFORMED    PERIPHERAL SMEAR REVIEW   Result Value Ref Range    Peripheral Smear Review see below    MORPHOLOGY   Result Value Ref Range    RBC Morphology Present     Polychromia 1+     Poikilocytosis 1+     Stomatocytes 1+    ESTIMATED GFR   Result Value Ref Range    GFR If African American >60 >60 mL/min/1.73 m 2    GFR If Non African American >60 >60 mL/min/1.73 m 2   EKG (NOW)   Result Value Ref Range    Report       [facility] Emergency Dept.    Test Date:  2018  Pt Name:    SHUN WHITFIELD              Department: ER  MRN:        [id number]                      Room:       BL 15  Gender:     Female                       Technician: [id number]  :        1985                   Requested By:ELMER SANCHEZ  Order #:    [id number]                    Reading MD:    Measurements  Intervals                                Axis  Rate:       62                           P:          73  IN:         154                          QRS:        47  QRSD:       92                           T:          36  QT:         428  QTc:        435    Interpretive Statements  SINUS RHYTHM  Compared to ECG 12/10/2007 01:32:50  Sinus bradycardia no longer present        All labs reviewed by me.    EKG  12 Lead EKG interpreted by me shows a normal sinus rhythm at a rate of 62 axis normal. No ST elevations. No T wave inversions.  IN interval of 154, QTC of 435 old EKG from 12/10/2007 shows no significant changes. Final impression: Sinus rhythm no longer bradycardic nonspecific EKG.    COURSE & MEDICAL DECISION MAKING  Pertinent Labs & Imaging studies reviewed. (See chart for details)    11:34 AM - Patient seen and examined at bedside. Patient will be treated with ativan 1 mg. Ordered differential manual, peripheral smear review, morphology, estimated GFR, urine drug screen, blood alcohol, CBC, CMP, lipase,  acetaminophen level, salicylate level, and EKG to evaluate her symptoms. The differential diagnoses include but are not limited to: Drug abuse, electrolyte abnormality    11:39 AM Obtained and reviewed past medical records which indicated a history of bipolar disorder and methamphetamine use.    12:41 PM Reviewed the patient's lab results.     1:59 PM On recheck, the patient is in a calmer state. Patient will be road tested.         Medical Decision Making: At this point time I think the patient most likely took meth amphetamines as well as LSD causing hallucinations.  Her electrolytes are unremarkable EKG is unremarkable.  She is gradually improving as she metabolizes.    The patient will return for new or worsening symptoms and is stable at the time of discharge.    The patient is referred to a primary physician for blood pressure management, diabetic screening, and for all other preventative health concerns.    DISPOSITION:  Patient will be discharged home in stable condition.    FOLLOW UP:  [facility]IANA West  [address] [place] [id number]  [phone]    Schedule an appointment as soon as possible for a visit  As needed      OUTPATIENT MEDICATIONS:  New Prescriptions    No medications on file         FINAL IMPRESSION  1. Methamphetamine abuse    2. Lysergic acid diethylamide (LSD) abuse    3. Hallucinations          Karlos SUMMERS ([place]), am scribing for, and in the presence of, Ryland Diez M.D.    Electronically signed by: Karlos Bell ([place]), 8/26/2018    Ryland SUMMERS M.D. personally performed the services described in this documentation, as scribed by Karlos Bell in my presence, and it is both accurate and complete.    The note accurately reflects work and decisions made by me.  Ryland Diez  8/26/2018  4:05 PM

## 2018-08-27 ENCOUNTER — PATIENT OUTREACH (OUTPATIENT)
Dept: HEALTH INFORMATION MANAGEMENT | Facility: OTHER | Age: 33
End: 2018-08-27

## 2018-08-27 NOTE — LETTER
Kaylie Burciaga  [address] [address]  [place] [address]    August 27, 2018      Dear Kaylie Burciaga,    [facility] wants to ensure your discharge home is safe and you or your loved ones have had all of your questions answered regarding your care after you leave the hospital.    Our discharge team was unsuccessful in our attempts to contact you telephonically and we wanted to be sure that you had a list of resources and contact information should you have any questions regarding your hospital stay, follow-up instructions, or active medical symptoms.    Questions or Concerns Regarding… Contact   Medical Questions Related to Your Discharge  (7 days a week, 8am-5pm) Contact a Nurse Care Coordinator   [phone]   Medical Questions Not Related to Your Discharge  (24 hours a day / 7 days a week)  Contact the Nurse Health Line   [phone]    Medications or Discharge Instructions Refer to your discharge packet   or contact your -[phone]   Follow-up Appointment(s) Schedule your appointment via Calligo   or contact Scheduling [phone]   Billing Review your statement via Calligo  or contact Billing [phone]   Medical Records Review your records via Calligo   or contact Medical Records [phone]     You can also easily access your medical information, test results and upcoming appointments via the Calligo free online health management tool. You can learn more and sign up at [employer]Calligo. For assistance setting up your Calligo account, please call [phone].    Once again, we want to ensure your discharge home is safe and that you have a clear understanding of any next steps in your care. If you have any questions or concerns, please do not hesitate to contact us, we are here for you. Thank you for choosing [facility] for your healthcare needs.    Sincerely,      Your [facility] Healthcare Team

## 2018-08-27 NOTE — PROGRESS NOTES
Chart reviewed.  Pt was discharged from [facility] ER 8/26/18 and does not have a contact phone number listed in Epic record.  Discharge outreach letter mailed to pt.

## 2018-08-31 LAB — EKG IMPRESSION: NORMAL

## 2018-09-07 ENCOUNTER — HOSPITAL ENCOUNTER (EMERGENCY)
Facility: MEDICAL CENTER | Age: 33
End: 2018-09-07
Attending: EMERGENCY MEDICINE

## 2018-09-07 VITALS
BODY MASS INDEX: 24.24 KG/M2 | RESPIRATION RATE: 18 BRPM | HEIGHT: 64 IN | WEIGHT: 141.98 LBS | TEMPERATURE: 97 F | HEART RATE: 89 BPM | SYSTOLIC BLOOD PRESSURE: 125 MMHG | DIASTOLIC BLOOD PRESSURE: 76 MMHG

## 2018-09-07 DIAGNOSIS — L98.9 SKIN LESIONS: ICD-10-CM

## 2018-09-07 DIAGNOSIS — F15.10 METHAMPHETAMINE ABUSE (HCC): ICD-10-CM

## 2018-09-07 PROCEDURE — 99283 EMERGENCY DEPT VISIT LOW MDM: CPT

## 2018-09-07 ASSESSMENT — LIFESTYLE VARIABLES: DO YOU DRINK ALCOHOL: YES

## 2018-09-07 NOTE — DISCHARGE INSTRUCTIONS
Cellulitis, Adult  Introduction  Cellulitis is a skin infection. The infected area is usually red and sore. This condition occurs most often in the arms and lower legs. It is very important to get treated for this condition.  Follow these instructions at home:  · Take over-the-counter and prescription medicines only as told by your doctor.  · If you were prescribed an antibiotic medicine, take it as told by your doctor. Do not stop taking the antibiotic even if you start to feel better.  · Drink enough fluid to keep your pee (urine) clear or pale yellow.  · Do not touch or rub the infected area.  · Raise (elevate) the infected area above the level of your heart while you are sitting or lying down.  · Place warm or cold wet cloths (warm or cold compresses) on the infected area. Do this as told by your doctor.  · Keep all follow-up visits as told by your doctor. This is important. These visits let your doctor make sure your infection is not getting worse.  Contact a doctor if:  · You have a fever.  · Your symptoms do not get better after 1-2 days of treatment.  · Your bone or joint under the infected area starts to hurt after the skin has healed.  · Your infection comes back. This can happen in the same area or another area.  · You have a swollen bump in the infected area.  · You have new symptoms.  · You feel ill and also have muscle aches and pains.  Get help right away if:  · Your symptoms get worse.  · You feel very sleepy.  · You throw up (vomit) or have watery poop (diarrhea) for a long time.  · There are red streaks coming from the infected area.  · Your red area gets larger.  · Your red area turns darker.  This information is not intended to replace advice given to you by your health care provider. Make sure you discuss any questions you have with your health care provider.  Document Released: 06/05/2009 Document Revised: 05/25/2017 Document Reviewed: 10/26/2016  © 2017 Elsevier          Stimulant Use  Disorder-Methamphetamines  Methamphetamines belong to a group of powerful drugs known as stimulants. Common street names for methamphetamine are meth, speed, crystal, ice, glass, and chalk. Methamphetamines have some medical uses, but they are often misused because of the effects that they produce. These effects include:  · A feeling of extreme pleasure (euphoria).  · Alertness.  · A high energy level.  · Increased sexuality.  Stimulant use disorder is when your stimulant use disrupts your daily life. It may disrupt your relationships and how you do your job. Stimulant use disorder can be dangerous. Methamphetamines increase your blood pressure and heart rate. Using them can lead to a heart attack or stroke. Methamphetamines can also make your heart rate irregular and cause seizures. These problems can lead to death.  What are the causes?  This condition is caused by misusing methamphetamines for a period of time, such as by taking them for reasons other than to treat a diagnosed problem. Many people start using methamphetamine because they make them feel good. Over time, they get addicted to them. When they try to stop using it, they feel sick.  Methamphetamines work fast, and the good feelings that they produce go away quickly. As a result, people often binge on the drug and take multiple doses over short periods of time.  What increases the risk?  This condition is more likely to develop in people who:  · Misuse other drugs.  · Have problems with mood or behavior.  What are the signs or symptoms?  Symptoms of this condition include:  · Using greater amounts of a methamphetamine than you want to, or using a methamphetamine for longer than you want to.  · Trying several times to use less of a methamphetamine or to control your methamphetamine use.  · Craving methamphetamines.  · Spending a lot of time getting methamphetamines, using them, or recovering from their effects.  · Having problems at work, at school, at  home, or in relationships because of methamphetamine use.  · Giving up or cutting down on important life activities because of methamphetamine use.  · Using methamphetamines when it is dangerous, such as when driving a car.  · Continuing to use methamphetamines even though they are causing or have led to a physical problem, such as:  ¨ Extreme weight loss.  ¨ Malnutrition.  ¨ Jaw clenching.  ¨ Severe dental problems.  ¨ Lung problems.  ¨ Skin sores.  ¨ An infection, such as hepatitis or HIV (human immunodeficiency virus).  · Continuing to use methamphetamines even though they are causing a mental problem, such as:  ¨ Memory problems.  ¨ Seeing or hearing things that are not really there (having hallucinations).  ¨ Violent behavior.  ¨ Anxiety.  ¨ Sleep problems.  · Needing more and more of a methamphetamine to get the same effect that you want (building up a tolerance).  · Having symptoms of withdrawal when you stop using a methamphetamine. Symptoms of withdrawal include:  ¨ Inability to feel pleasure (anhedonia).  ¨ Irritability.  ¨ Low energy.  ¨ Restlessness.  ¨ Bad dreams.  ¨ Too little or too much sleep.  ¨ Increased appetite.  How is this diagnosed?  This condition is diagnosed with an assessment. During the assessment, your health care provider will ask about your methamphetamine use and about how it affects your life. You will be diagnosed with the condition if you have had at least two symptoms of this condition within a 12-month period. How severe the condition is depends on how many symptoms you have:  · If you have 2 or 3 symptoms, your condition is mild.  · If you have 4 or 5 symptoms, your condition is moderate.  · If you have 6 or more symptoms, your condition is severe.  Your health care provider may perform a physical exam or do lab tests to see if you have physical problems resulting from methamphetamine use. Your health care provider may also screen for drug use and refer you to a mental health  professional for evaluation.  How is this treated?  Treatment for this condition may involve:  · Immediate care. This treatment addresses your symptoms and immediate needs. It helps prevent or minimize damage from any physical or mental problems that are related to your methamphetamine use.  · Medicines to treat related disorders.  · Long-term substance abuse treatment. This type helps you stop using methamphetamines. It is usually provided by mental health professionals with training in substance use disorders. It usually involves a combination of the following:  ¨ Counseling. This treatment is also called talk therapy. It is provided by substance use treatment counselors. A counselor can address the reasons you use methamphetamines and suggest ways to keep you from using methamphetamines again. The goals of talk therapy are to find healthy activities and ways to cope with stress, identify and avoid what triggers your methamphetamine use, and help you learn how to handle cravings.  ¨ Support groups. Support groups are run by people who have quit using stimulants. They provide emotional support, advice, and guidance.  Follow these instructions at home:  · Take over-the-counter and prescription medicines only as told by your health care provider.  · Check with your health care provider before starting any new medicines.  · Keep all follow-up visits as told by your health care provider. This is important.  · Take care of your teeth by:  ¨ Brushing and flossing your teeth. Do this two times a day.  ¨ Using an antibacterial mouthwash.  ¨ Avoiding sugary drinks.  ·  Do not use any products that contain nicotine or tobacco, such as cigarettes and e-cigarettes. If you need help quitting, ask your health care provider  Where to find more information:  · [place] on Drug Abuse: www.drugabuse.gov  · Substance Abuse and Mental Health Services Administration: www.samhsa.gov  Contact a health care provider if:  · Your  symptoms get worse.  · You use methamphetamines again.  · You are not able to take medicines as told.  Get help right away if:  · You have serious thoughts about hurting yourself or others.  · You have a seizure.  · You have chest pain.  · You have sudden weakness.  · You lose some of your vision.  · You lose some of your speech.  If you ever feel like you may hurt yourself or others, or have thoughts about taking your own life, get help right away. You can go to your nearest emergency department or call:  · Your local emergency services (911 in the [place]  · A suicide crisis helpline, such as the National Suicide Prevention Lifeline at 1-812.290.9251. This is open 24 hours a day.  This information is not intended to replace advice given to you by your health care provider. Make sure you discuss any questions you have with your health care provider.  Document Released: 08/23/2005 Document Revised: 09/29/2017 Document Reviewed: 09/29/2017  Elsevier Interactive Patient Education © 2017 Elsevier Inc.

## 2018-09-07 NOTE — ED PROVIDER NOTES
ED Provider Note    Scribed for Ervin Beyer M.D. by Karlos Bell. 9/7/2018, 5:49 AM.    Primary care provider: [facility]  Means of arrival: Walk in  History obtained from: Patient  History limited by: None    CHIEF COMPLAINT  Chief Complaint   Patient presents with   • Bug Bite     pt [place] and bites on right ankle and right thumb   • Ankle Swelling     right ankle is swollen compared to left.        HPI  Kaylie Burciaga is a 33 y.o. female who presents to the Emergency Department for persistent right ankle swelling with associated right ankle pain and right thumb pain onset 1 week ago. She has a history of methamphetamine use. The patient reports the landlord changed the locks and she has not been able to get into her apartment. No complaints of fever or other injury at this time.     REVIEW OF SYSTEMS  Pertinent positives include right ankle swelling with pain and right thumb pain.   Pertinent negatives include no fever or other injury.    E.      PAST MEDICAL HISTORY   has a past medical history of Anxiety and depression; ASTHMA; bipolar; Blood transfusion (2010); Depression; Hypothyroidism due to acquired atrophy of thyroid (5/31/2016); and Nausea & vomiting (1/15/2012).    SURGICAL HISTORY   has a past surgical history that includes gastroscopy with biopsy (11/29/08); other (T & A 5 yrs ago); other abdominal surgery; tonsillectomy; and marco by laparoscopy (12/1/08).    SOCIAL HISTORY  Social History   Substance Use Topics   • Smoking status: Current Every Day Smoker     Packs/day: 0.50     Years: 11.00     Types: Cigarettes   • Smokeless tobacco: Never Used      Comment:  Patient has cut down to less than half a pack a day.   • Alcohol use No      Comment: does not drink      History   Drug Use No     Comment: Last smoked 1 year ago.       FAMILY HISTORY  Family History   Problem Relation Age of Onset   • Diabetes Mother         Insulin   •  "Hypertension Mother    • Alcohol/Drug Mother        CURRENT MEDICATIONS  No current facility-administered medications on file prior to encounter.      No current outpatient prescriptions on file prior to encounter.      ALLERGIES  Allergies   Allergen Reactions   • Penicillins Anaphylaxis   • Morphine Rash     Rash only       PHYSICAL EXAM  VITAL SIGNS: /76   Pulse 89   Temp 36.1 °C (97 °F)   Resp 18   Ht 1.626 m (5' 4\")   Wt 64.4 kg (141 lb 15.6 oz)   BMI 24.37 kg/m²     Nursing note and vitals reviewed.  Constitutional: Dirty and disheveled, No distress.   HENT: Head is atraumatic. Oropharynx is moist.   Eyes: Conjunctivae are normal. Pupils are equal, round, and reactive to light.   Cardiovascular: Normal peripheral perfusion  Respiratory: No respiratory distress.   Musculoskeletal: Normal range of motion.   Neurological: Alert. No focal deficits noted.    Skin: Scattered skin lesions, small eschar on post aspect on right leg with no surrounding erythema, other nonspecific scattered excoriations noted  Psych: Appropriate for clinical situation     COURSE & MEDICAL DECISION MAKING  Nursing notes, VS, PMSFHx reviewed in chart.    Review of past medical records shows the patient was last here in the ED on 8/26 for methamphetamine use.     5:49 AM - Patient seen and examined at bedside. Discussed with the patient I will discharge her with antibiotics to treat her symptoms, patient agrees with plan of care.     HTN/IDDM FOLLOW UP:  The patient is referred to a primary physician for blood pressure management, diabetic screening, and for all other preventive health concerns    DISPOSITION:  Patient will be discharged home in stable condition.    FOLLOW UP:  [facility], Emergency Dept  [address] [address] [phone]    If symptoms worsen    [facility]IA[facility] West  [address] [id number] [phone]    Schedule an appointment as soon as possible for " a visit        OUTPATIENT MEDICATIONS:  New Prescriptions    MUPIROCIN (BACTROBAN) 2 % OINTMENT    Apply to affected area twice daily as needed.       The patient was discharged home with an information sheet on cellulitis and stimulant use disorder and told to return immediately for any signs or symptoms listed.  The patient agreed to the discharge precautions and follow-up plan which is documented in EPIC.    FINAL IMPRESSION  1. Skin lesions    2. Methamphetamine abuse          Karlos SUMMERS (Chitoibe), am scribing for, and in the presence of, Ervin Beyer M.D..    Electronically signed by: Karlos Bell (Scribe), 9/7/2018    Ervin SUMMERS M.D. personally performed the services described in this documentation, as scribed by Karlos Bell in my presence, and it is both accurate and complete.    The note accurately reflects work and decisions made by me.  Ervin Beyer  9/7/2018  11:23 AM

## 2018-09-07 NOTE — ED TRIAGE NOTES
"Chief Complaint   Patient presents with   • Bug Bite     pt states she [place] and bites on right ankle and right thumb   • Ankle Swelling     right ankle is swollen compared to left.      /76   Pulse 89   Temp 36.1 °C (97 °F)   Resp 18   Ht 1.626 m (5' 4\")   Wt 64.4 kg (141 lb 15.6 oz)   BMI 24.37 kg/m²     Pt placed back out in lobby, requested that she notify staff of any changes.   "

## 2018-09-07 NOTE — ED NOTES
"Pt requesting to have a male nurse and male MD. Pt refusing to answer questions for primary nurse. Pt becoming angry, raising her voice\", told rn she was done talking to her and she needed to leave room. \" Rn informed CLAYTON White of pt request.   "

## 2018-09-07 NOTE — ED NOTES
rn educated pt on discharge instructions pt very upset that methamphetamine abuse is on her discharge paperwork, rn tried to educated pt but pt started to take out all over her belongings out of her bag and being verbally abusive to rn. Security called to bedside to help escort pt due to her being medically cleared.

## 2018-09-08 ENCOUNTER — PATIENT OUTREACH (OUTPATIENT)
Dept: HEALTH INFORMATION MANAGEMENT | Facility: OTHER | Age: 33
End: 2018-09-08

## 2018-09-08 NOTE — LETTER
Kaylie Burciaga  [address] [address]  [place] [address]    September 8, 2018      Dear Kaylie Burciaga,    [facility] wants to ensure your discharge home is safe and you or your loved ones have had all of your questions answered regarding your care after you leave the hospital.    Our discharge team was unsuccessful in our attempts to contact you telephonically and we wanted to be sure that you had a list of resources and contact information should you have any questions regarding your hospital stay, follow-up instructions, or active medical symptoms.    Questions or Concerns Regarding… Contact   Medical Questions Related to Your Discharge  (7 days a week, 8am-5pm) Contact a Nurse Care Coordinator   [phone]   Medical Questions Not Related to Your Discharge  (24 hours a day / 7 days a week)  Contact the Nurse Health Line   [phone]    Medications or Discharge Instructions Refer to your discharge packet   or contact your -[phone]   Follow-up Appointment(s) Schedule your appointment via [employer]   or contact Scheduling [phone]   Billing Review your statement via [employer]  or contact Billing [phone]   Medical Records Review your records via [employer]   or contact Medical Records [phone]     You can also easily access your medical information, test results and upcoming appointments via the [employer] free online health management tool. You can learn more and sign up at [employer]. For assistance setting up your [employer] account, please call [phone].    Once again, we want to ensure your discharge home is safe and that you have a clear understanding of any next steps in your care. If you have any questions or concerns, please do not hesitate to contact us, we are here for you. Thank you for choosing [facility] for your healthcare needs.    Sincerely,      Your [facility] Healthcare Team

## 2018-09-08 NOTE — PROGRESS NOTES
Pt was discharged from [facility] ER 9/7/18 and does not have a contact phone number listed in Epic record.  Discharge outreach letter mailed to pt.

## 2018-10-15 ENCOUNTER — HOSPITAL ENCOUNTER (EMERGENCY)
Facility: MEDICAL CENTER | Age: 33
End: 2018-10-15
Attending: EMERGENCY MEDICINE

## 2018-10-15 ENCOUNTER — APPOINTMENT (OUTPATIENT)
Dept: RADIOLOGY | Facility: MEDICAL CENTER | Age: 33
End: 2018-10-15
Attending: EMERGENCY MEDICINE

## 2018-10-15 VITALS
HEART RATE: 68 BPM | RESPIRATION RATE: 18 BRPM | TEMPERATURE: 98 F | WEIGHT: 140 LBS | HEIGHT: 65 IN | BODY MASS INDEX: 23.32 KG/M2 | DIASTOLIC BLOOD PRESSURE: 78 MMHG | SYSTOLIC BLOOD PRESSURE: 122 MMHG | OXYGEN SATURATION: 98 %

## 2018-10-15 DIAGNOSIS — S50.02XA CONTUSION OF LEFT ELBOW, INITIAL ENCOUNTER: ICD-10-CM

## 2018-10-15 PROCEDURE — A9270 NON-COVERED ITEM OR SERVICE: HCPCS | Performed by: EMERGENCY MEDICINE

## 2018-10-15 PROCEDURE — 73080 X-RAY EXAM OF ELBOW: CPT | Mod: LT

## 2018-10-15 PROCEDURE — 700102 HCHG RX REV CODE 250 W/ 637 OVERRIDE(OP): Performed by: EMERGENCY MEDICINE

## 2018-10-15 PROCEDURE — 99284 EMERGENCY DEPT VISIT MOD MDM: CPT

## 2018-10-15 PROCEDURE — 71045 X-RAY EXAM CHEST 1 VIEW: CPT

## 2018-10-15 RX ORDER — HYDROCODONE BITARTRATE AND ACETAMINOPHEN 5; 325 MG/1; MG/1
1 TABLET ORAL ONCE
Status: COMPLETED | OUTPATIENT
Start: 2018-10-15 | End: 2018-10-15

## 2018-10-15 RX ADMIN — HYDROCODONE BITARTRATE AND ACETAMINOPHEN 1 TABLET: 5; 325 TABLET ORAL at 02:51

## 2018-10-15 ASSESSMENT — PAIN SCALES - GENERAL
PAINLEVEL_OUTOF10: 5
PAINLEVEL_OUTOF10: 7

## 2018-10-15 ASSESSMENT — PAIN DESCRIPTION - DESCRIPTORS: DESCRIPTORS: ACHING
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
## 2018-10-15 NOTE — ED NOTES
"Attempting to discharge patient. Patient states \"I need to talk to social work about my prescriptions.\" Patient informed that she was not being prescribed any medications from the ER. Nadira in social work contacted and instructed this nurse to inform the patient that she was not being prescribed any medications. Patient verbalizes understanding of the situation. Patient signed DC paperwork and walked out of the ER with a steady gait carrying her belongings. Patient calm and cooperative. Patient A&O x4 and in no apparent distress, VSS.   "
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
## 2018-10-16 ENCOUNTER — PATIENT OUTREACH (OUTPATIENT)
Dept: HEALTH INFORMATION MANAGEMENT | Facility: OTHER | Age: 33
End: 2018-10-16

## 2018-10-16 NOTE — LETTER
Kaylie Burciaga  No Address  [place] [address]    October 16, 2018      Dear Kaylie Burciaga,    [facility] wants to ensure your discharge home is safe and you or your loved ones have had all of your questions answered regarding your care after you leave the hospital.    Our discharge team was unsuccessful in our attempts to contact you telephonically and we wanted to be sure that you had a list of resources and contact information should you have any questions regarding your hospital stay, follow-up instructions, or active medical symptoms.    Questions or Concerns Regarding… Contact   Medical Questions Related to Your Discharge  (7 days a week, 8am-5pm) Contact a Nurse Care Coordinator   [phone]   Medical Questions Not Related to Your Discharge  (24 hours a day / 7 days a week)  Contact the Nurse Health Line   [phone]    Medications or Discharge Instructions Refer to your discharge packet   or contact your -[phone]   Follow-up Appointment(s) Schedule your appointment via [employer]   or contact Scheduling [phone]   Billing Review your statement via [employer]  or contact Billing [phone]   Medical Records Review your records via [employer]   or contact Medical Records [phone]     You can also easily access your medical information, test results and upcoming appointments via the [employer] free online health management tool. You can learn more and sign up at [employer]. For assistance setting up your [employer] account, please call [phone].    Once again, we want to ensure your discharge home is safe and that you have a clear understanding of any next steps in your care. If you have any questions or concerns, please do not hesitate to contact us, we are here for you. Thank you for choosing [facility] for your healthcare needs.    Sincerely,      Your [facility] Healthcare Team

## 2018-12-18 ENCOUNTER — HOSPITAL ENCOUNTER (EMERGENCY)
Dept: HOSPITAL 8 - ED | Age: 33
Discharge: HOME | End: 2018-12-18
Payer: COMMERCIAL

## 2018-12-18 VITALS — WEIGHT: 144.4 LBS | HEIGHT: 64 IN | BODY MASS INDEX: 24.65 KG/M2

## 2018-12-18 VITALS — SYSTOLIC BLOOD PRESSURE: 118 MMHG | DIASTOLIC BLOOD PRESSURE: 76 MMHG

## 2018-12-18 DIAGNOSIS — H65.01: ICD-10-CM

## 2018-12-18 DIAGNOSIS — S39.012A: ICD-10-CM

## 2018-12-18 DIAGNOSIS — S06.0X0A: Primary | ICD-10-CM

## 2018-12-18 DIAGNOSIS — H72.91: ICD-10-CM

## 2018-12-18 DIAGNOSIS — R55: ICD-10-CM

## 2018-12-18 DIAGNOSIS — S01.01XA: ICD-10-CM

## 2018-12-18 LAB
ALBUMIN SERPL-MCNC: 3.5 G/DL (ref 3.4–5)
ANION GAP SERPL CALC-SCNC: 7 MMOL/L (ref 5–15)
BASOPHILS # BLD AUTO: 0.02 X10^3/UL (ref 0–0.1)
BASOPHILS NFR BLD AUTO: 0 % (ref 0–1)
CALCIUM SERPL-MCNC: 8.6 MG/DL (ref 8.5–10.1)
CHLORIDE SERPL-SCNC: 108 MMOL/L (ref 98–107)
CREAT SERPL-MCNC: 0.9 MG/DL (ref 0.55–1.02)
EOSINOPHIL # BLD AUTO: 0.07 X10^3/UL (ref 0–0.4)
EOSINOPHIL NFR BLD AUTO: 2 % (ref 1–7)
ERYTHROCYTE [DISTWIDTH] IN BLOOD BY AUTOMATED COUNT: 27.4 % (ref 9.6–15.2)
LYMPHOCYTES # BLD AUTO: 1.28 X10^3/UL (ref 1–3.4)
LYMPHOCYTES NFR BLD AUTO: 26 % (ref 22–44)
MCH RBC QN AUTO: 26.4 PG (ref 27–34.8)
MCHC RBC AUTO-ENTMCNC: 33 G/DL (ref 32.4–35.8)
MCV RBC AUTO: 79.9 FL (ref 80–100)
MD: (no result)
MONOCYTES # BLD AUTO: 0.34 X10^3/UL (ref 0.2–0.8)
MONOCYTES NFR BLD AUTO: 7 % (ref 2–9)
NEUTROPHILS # BLD AUTO: 3.19 X10^3/UL (ref 1.8–6.8)
NEUTROPHILS NFR BLD AUTO: 65 % (ref 42–75)
PLATELET # BLD AUTO: 228 X10^3/UL (ref 130–400)
PMV BLD AUTO: 8.4 FL (ref 7.4–10.4)
RBC # BLD AUTO: 4.4 X10^6/UL (ref 3.82–5.3)

## 2018-12-18 PROCEDURE — 72125 CT NECK SPINE W/O DYE: CPT

## 2018-12-18 PROCEDURE — 36415 COLL VENOUS BLD VENIPUNCTURE: CPT

## 2018-12-18 PROCEDURE — 84703 CHORIONIC GONADOTROPIN ASSAY: CPT

## 2018-12-18 PROCEDURE — 99284 EMERGENCY DEPT VISIT MOD MDM: CPT

## 2018-12-18 PROCEDURE — 85025 COMPLETE CBC W/AUTO DIFF WBC: CPT

## 2018-12-18 PROCEDURE — 90715 TDAP VACCINE 7 YRS/> IM: CPT

## 2018-12-18 PROCEDURE — 90471 IMMUNIZATION ADMIN: CPT

## 2018-12-18 PROCEDURE — 70450 CT HEAD/BRAIN W/O DYE: CPT

## 2018-12-18 PROCEDURE — 72110 X-RAY EXAM L-2 SPINE 4/>VWS: CPT

## 2018-12-18 PROCEDURE — 80048 BASIC METABOLIC PNL TOTAL CA: CPT

## 2018-12-18 PROCEDURE — 82040 ASSAY OF SERUM ALBUMIN: CPT

## 2019-04-23 ENCOUNTER — OFFICE VISIT (OUTPATIENT)
Dept: MEDICAL GROUP | Facility: MEDICAL CENTER | Age: 34
End: 2019-04-23
Attending: FAMILY MEDICINE
Payer: MEDICAID

## 2019-04-23 VITALS
WEIGHT: 204 LBS | RESPIRATION RATE: 14 BRPM | HEART RATE: 80 BPM | DIASTOLIC BLOOD PRESSURE: 80 MMHG | SYSTOLIC BLOOD PRESSURE: 120 MMHG | OXYGEN SATURATION: 97 % | BODY MASS INDEX: 34.83 KG/M2 | TEMPERATURE: 98 F | HEIGHT: 64 IN

## 2019-04-23 DIAGNOSIS — Z72.0 TOBACCO ABUSE: ICD-10-CM

## 2019-04-23 DIAGNOSIS — M54.5 MIDLINE LOW BACK PAIN, UNSPECIFIED CHRONICITY, WITH SCIATICA PRESENCE UNSPECIFIED: ICD-10-CM

## 2019-04-23 DIAGNOSIS — J45.20 MILD INTERMITTENT ASTHMA WITHOUT COMPLICATION: ICD-10-CM

## 2019-04-23 DIAGNOSIS — F43.10 PTSD (POST-TRAUMATIC STRESS DISORDER): ICD-10-CM

## 2019-04-23 DIAGNOSIS — F31.81 BIPOLAR 2 DISORDER, MAJOR DEPRESSIVE EPISODE (HCC): ICD-10-CM

## 2019-04-23 PROCEDURE — 99213 OFFICE O/P EST LOW 20 MIN: CPT | Performed by: FAMILY MEDICINE

## 2019-04-23 PROCEDURE — 99214 OFFICE O/P EST MOD 30 MIN: CPT | Performed by: FAMILY MEDICINE

## 2019-04-23 RX ORDER — ALBUTEROL SULFATE 90 UG/1
2 AEROSOL, METERED RESPIRATORY (INHALATION) EVERY 6 HOURS PRN
Qty: 8.5 G | Refills: 5 | Status: SHIPPED | OUTPATIENT
Start: 2019-04-23 | End: 2019-12-16 | Stop reason: SDUPTHER

## 2019-04-23 RX ORDER — TRAZODONE HYDROCHLORIDE 100 MG/1
100 TABLET ORAL NIGHTLY PRN
Qty: 30 TAB | Refills: 3 | Status: SHIPPED | OUTPATIENT
Start: 2019-04-23 | End: 2019-12-16

## 2019-04-23 RX ORDER — RISPERIDONE 2 MG/1
2 TABLET ORAL DAILY
Qty: 30 TAB | Refills: 1 | Status: SHIPPED | OUTPATIENT
Start: 2019-04-23 | End: 2019-12-16

## 2019-04-23 RX ORDER — MELOXICAM 7.5 MG/1
7.5 TABLET ORAL DAILY
Qty: 30 TAB | Refills: 3 | Status: SHIPPED | OUTPATIENT
Start: 2019-04-23 | End: 2019-12-16

## 2019-04-23 RX ORDER — RISPERIDONE 3 MG/1
3 TABLET ORAL
Qty: 30 TAB | Refills: 1 | Status: SHIPPED | OUTPATIENT
Start: 2019-04-23 | End: 2019-12-16

## 2019-04-23 RX ORDER — CYCLOBENZAPRINE HCL 10 MG
5-10 TABLET ORAL 3 TIMES DAILY PRN
Qty: 60 TAB | Refills: 1 | Status: SHIPPED | OUTPATIENT
Start: 2019-04-23 | End: 2019-12-16

## 2019-04-23 ASSESSMENT — ENCOUNTER SYMPTOMS
HALLUCINATIONS: 0
ABDOMINAL PAIN: 0
NEUROLOGICAL NEGATIVE: 1
NECK PAIN: 0
BACK PAIN: 1
FEVER: 0
INSOMNIA: 0
SHORTNESS OF BREATH: 0
DEPRESSION: 1
NAUSEA: 0
PALPITATIONS: 0
NERVOUS/ANXIOUS: 1
SPUTUM PRODUCTION: 0
CHILLS: 0
COUGH: 0
VOMITING: 0

## 2019-04-23 ASSESSMENT — PATIENT HEALTH QUESTIONNAIRE - PHQ9: CLINICAL INTERPRETATION OF PHQ2 SCORE: 0

## 2019-04-23 ASSESSMENT — LIFESTYLE VARIABLES: SUBSTANCE_ABUSE: 0

## 2019-04-23 NOTE — PROGRESS NOTES
Subjective:      Kaylie Burciaga is a 34 y.o. female who presents with [facility] and Back Pain            Patient 34-year-old female here to reestablish with the clinic today.    She has a past history of chronic back pain, posttraumatic stress disorder, bipolar disorder, asthma and tobacco use and a history of substance abuse.    Patient states she was recently from [facility] after being incarcerated.  After being released from [facility] she initially got her medications refilled by a private physician in [facility], but now that she has Medicaid she has established with this clinic.  We will refill her psychiatric medications today until she is able to establish with a psychiatrist.  Also a psychology referral was made as well for counseling.  She had initially been a patient at [facility], but since she has Medicaid she is not able to reestablish there.  She is not having any urges to harm himself or others.  If she has any sudden worsening of her current symptoms or urge to harm self or others she has been advised to go to the emergency room for further assistance and management.    She is currently working as a reason for, and after bending for a period of time she has a significant amount of pain in her lower to mid back.  We will have her continue to use her Flexeril as directed.  Patient cautioned of the sedating effects of the medication.  We will also have her try Mobic since it is a once daily medication.  We will also get an x-ray of her lower back and refer to physical therapy as well.  If those things do not work will put in an order for an MRI of her lower back.  In the meantime we will also refer to pain management since this is a chronic problem that has been treated in the past, but is still persistent.  We will continue to follow.    She has history of asthma for which she uses her inhaler every few weeks.  She smokes tobacco, and has thought of quitting but is  "not interested in quitting at this time.  Discussed the effects of tobacco smoking on her lungs.  Discussed starting nicotine patches, and also smoking cessation programs in the community.  We will continue to follow.    Blood work will also be ordered to check her general health maintenance.  We will continue to follow.     Current medications, allergies, and problem list reviewed with patient and updated in EPIC.          Review of Systems   Constitutional: Negative for chills and fever.   HENT: Negative for hearing loss and tinnitus.    Respiratory: Negative for cough, sputum production and shortness of breath.    Cardiovascular: Negative for chest pain and palpitations.   Gastrointestinal: Negative for abdominal pain, nausea and vomiting.   Genitourinary: Negative.    Musculoskeletal: Positive for back pain. Negative for joint pain and neck pain.   Skin: Negative for rash.   Neurological: Negative.    Endo/Heme/Allergies: Negative.    Psychiatric/Behavioral: Positive for depression. Negative for hallucinations, substance abuse and suicidal ideas. The patient is nervous/anxious. The patient does not have insomnia.           Objective:     /80 (BP Location: Left arm, Patient Position: Sitting)   Pulse 80   Temp 36.7 °C (98 °F)   Resp 14   Ht 1.626 m (5' 4\")   Wt 92.5 kg (204 lb)   SpO2 97%   BMI 35.02 kg/m²      Physical Exam   Constitutional: She is oriented to person, place, and time.   BMI 35.02   HENT:   Head: Normocephalic and atraumatic.   Nose: Nose normal.   Mouth/Throat: Oropharynx is clear and moist.   Eyes: Pupils are equal, round, and reactive to light. Conjunctivae and EOM are normal.   Neck: Normal range of motion. Neck supple. No thyromegaly present.   Cardiovascular: Normal rate, regular rhythm and normal heart sounds.  Exam reveals no friction rub.    No murmur heard.  Pulmonary/Chest: Effort normal. No respiratory distress. She has no wheezes. She has no rales.   Slight coarse breath " sounds   Abdominal: Soft. Bowel sounds are normal. She exhibits no distension. There is no tenderness.   Musculoskeletal:   Slight decreased range of motion of back in flexion   Lymphadenopathy:     She has no cervical adenopathy.   Neurological: She is alert and oriented to person, place, and time.   Skin: Skin is warm and dry.   Psychiatric: She has a normal mood and affect. Her behavior is normal.   Nursing note and vitals reviewed.              Assessment/Plan:     1. Bipolar 2 disorder, major depressive episode (HCC)  We will have patient continue to use her medications as directed.  Refills of her medications will be sent to her pharmacy.  We will also refer patient to psychiatry as well as psychology for additional assistance and management.  We will continue to follow.  - REFERRAL TO PSYCHIATRY  - REFERRAL TO PSYCHOLOGY  - risperiDONE (RISPERDAL) 2 MG Tab; Take 1 Tab by mouth every day.  Dispense: 30 Tab; Refill: 1  - risperiDONE (RISPERDAL) 3 MG Tab; Take 1 Tab by mouth every bedtime.  Dispense: 30 Tab; Refill: 1  - traZODone (DESYREL) 100 MG Tab; Take 1 Tab by mouth at bedtime as needed for Sleep.  Dispense: 30 Tab; Refill: 3  - TSH WITH REFLEX TO FT4; Future  - Comp Metabolic Panel; Future  - CBC WITH DIFFERENTIAL; Future  - VITAMIN D,25 HYDROXY; Future  - Lipid Profile; Future    2. PTSD (post-traumatic stress disorder)  See above plan.  - REFERRAL TO PSYCHIATRY  - REFERRAL TO PSYCHOLOGY  - risperiDONE (RISPERDAL) 2 MG Tab; Take 1 Tab by mouth every day.  Dispense: 30 Tab; Refill: 1  - risperiDONE (RISPERDAL) 3 MG Tab; Take 1 Tab by mouth every bedtime.  Dispense: 30 Tab; Refill: 1  - traZODone (DESYREL) 100 MG Tab; Take 1 Tab by mouth at bedtime as needed for Sleep.  Dispense: 30 Tab; Refill: 3  - TSH WITH REFLEX TO FT4; Future  - Comp Metabolic Panel; Future  - CBC WITH DIFFERENTIAL; Future  - VITAMIN D,25 HYDROXY; Future  - Lipid Profile; Future    3. Midline low back pain, unspecified chronicity,  with sciatica presence unspecified  A referral to pain management as well as physical therapy will be made for patient today.  She will continue to use her medications as previously directed.  Instead of Motrin we will be substituting Mobic at 7.5 mg daily as needed.  X-rays of her back will be ordered today.  We will continue to follow.  - REFERRAL TO PHYSICAL THERAPY Reason for Therapy: Eval/Treat/Report  - DX-LUMBAR SPINE-2 OR 3 VIEWS; Future  - DX-LUMBAR SPINE-2 OR 3 VIEWS; Future  - REFERRAL TO PAIN CLINIC  - TSH WITH REFLEX TO FT4; Future  - Comp Metabolic Panel; Future  - CBC WITH DIFFERENTIAL; Future  - VITAMIN D,25 HYDROXY; Future  - Lipid Profile; Future    4. Mild intermittent asthma without complication  We will have her continue using her inhalers as directed.  Discussed smoking cessation with patient.  We will continue to follow.  - albuterol 108 (90 Base) MCG/ACT Aero Soln inhalation aerosol; Inhale 2 Puffs by mouth every 6 hours as needed for Shortness of Breath.  Dispense: 8.5 g; Refill: 5    5. Tobacco abuse  See above plan.

## 2019-04-25 ENCOUNTER — PHYSICAL THERAPY (OUTPATIENT)
Dept: PHYSICAL THERAPY | Facility: REHABILITATION | Age: 34
End: 2019-04-25
Attending: FAMILY MEDICINE
Payer: MEDICAID

## 2019-04-25 DIAGNOSIS — M54.5 MIDLINE LOW BACK PAIN, UNSPECIFIED CHRONICITY, WITH SCIATICA PRESENCE UNSPECIFIED: ICD-10-CM

## 2019-04-25 PROCEDURE — 97162 PT EVAL MOD COMPLEX 30 MIN: CPT

## 2019-04-25 PROCEDURE — 97110 THERAPEUTIC EXERCISES: CPT

## 2019-04-25 ASSESSMENT — ENCOUNTER SYMPTOMS
PAIN SCALE: 4
PAIN LOCATION: MIDLINE LOW BACK
ALLEVIATING FACTORS: HEAT APPLICATION
QUALITY: ACHING
EXACERBATED BY: LIFTING
EXACERBATED BY: WALKING
EXACERBATED BY: BENDING
EXACERBATED BY: PROLONGED SITTING
PAIN SCALE AT LOWEST: 0
QUALITY: THROBBING
PAIN TIMING: INTERMITTENT
ALLEVIATING FACTORS: PHARMACOTHERAPY

## 2019-04-25 NOTE — OP THERAPY EVALUATION
Outpatient Physical Therapy  INITIAL EVALUATION    [facility] Physical [place] [address]  [address]  [place] [address]  Phone:  [phone]  Fax:  [phone]    Date of Evaluation: 2019    Patient: Kaylie Burciaga  YOB: 1985  MRN: [id number]     Referring Provider: Brent Randolph M.D.  [address]  [address]  [place] [address]   Referring Diagnosis Midline low back pain, unspecified chronicity, with sciatica presence unspecified [M54.5]     Time Calculation  Start time: 1033  Stop time: 1113 Time Calculation (min): 40 minutes     Physical Therapy Occurrence Codes    Date of onset of impairment:  1/15/19   Date physical therapy care plan established or reviewed:  19   Date physical therapy treatment started:  19          Chief Complaint: Back Problem    Visit Diagnoses     ICD-10-CM   1. Midline low back pain, unspecified chronicity, with sciatica presence unspecified M54.5         Subjective:   History of Present Illness:     Date of onset:  1/15/2019    Mechanism of injury:  Midline low back pain, since fall in January.  Passed out and fell, hit her head.  Required stitches. A few days later, back pain came on.  Seems to have worsened since then.  15 to 20 min walk causes 4/10 pain.  Works [employer], 4 days a week, a few hours a day.  Requires bending and lifting about half the time at work.  Swims at gym for exercise.  Elliptical and weight machines limited due to back pain.    Sleep disturbance:  Interrupted sleep and difficulty falling asleep  Pain:     Current pain ratin    At best pain ratin    Location:  Midline low back    Quality:  Aching and throbbing    Pain timing:  Intermittent    Relieving factors:  Heat application and pharmacotherapy (swimming, ibuprofen and flexeril)    Aggravating factors:  Walking, bending, prolonged sitting and lifting (prolonged position in bed, walking > 15 min.  sitting > 15 min)    Progression:   Worsening  Patient Goals:     Patient goals for therapy:  Decreased pain      Past Medical History:   Diagnosis Date   • Anxiety and depression    • ASTHMA     since birth, used albuterol as needed   • bipolar    • Blood transfusion 2010    Gallbladder removed and had something punctured   • Depression     at age 11   • Hypothyroidism due to acquired atrophy of thyroid 5/31/2016   • Nausea & vomiting 1/15/2012     Past Surgical History:   Procedure Laterality Date   • STEFAN BY LAPAROSCOPY  12/1/08    Performed by BELLA ROQUE at SURGERY [facility]   • GASTROSCOPY WITH BIOPSY  11/29/08    Performed by GONZALES NAYAK at ENDOSCOPY [facility]   • OTHER  T & A 5 yrs ago   • OTHER ABDOMINAL SURGERY     • TONSILLECTOMY      Age at 17     Social History   Substance Use Topics   • Smoking status: Current Every Day Smoker     Packs/day: 0.50     Years: 11.00     Types: Cigarettes   • Smokeless tobacco: Never Used      Comment:  Patient has cut down to less than half a pack a day.   • Alcohol use Yes     Family and Occupational History     Social History   • Marital status: Single     Spouse name: N/A   • Number of children: N/A   • Years of education: N/A       Objective     Hip Screen   Hip range of motion within functional limits with the following exceptions: R hip IR PROM limited by pain  L SKC mild back pain.  R FADIR positive for low back pain  Hip strength within functional limits    Tenderness     Left Hip   Tenderness in the sacroiliac joint.      Right Hip   Tenderness in the sacroiliac joint.     Active Range of Motion     Lumbar   Flexion: decreased (25%, pain)  Extension: within functional limits (hinge at mid L/S)  Left lateral flexion: within functional limits (right pain)  Right lateral flexion: within functional limits  Left rotation: within functional limits  Right rotation: within functional limits (left pain)    Joint Play   Spine     Central PA Avis        L1: painful       L2:  "painful       L3: WFL       L4: WFL       L5: painful       S1: painful    Unilateral PA Glide (left)        S1: painful    Unilateral PA Glide (right)        S1: painful    Left Hip     Long axis distraction: within functional limits    Right Hip     Long axis distraction: painful    Strength:      Lower extremities   Normal left lower extremity strength  Normal right lower extremity strength    Left Hip   Planes of Motion   Left hip flexors strength: pulling.  Left hip extension strength: pulling.    Right Hip   Planes of Motion   Right hip flexors strength: pulling.    Tests     Left Pelvic Girdle/Sacrum   Positive: sacral rotation.   Negative: thigh thrust.     Right Pelvic Girdle/Sacrum   Positive: sacral rotation.   Negative: thigh thrust.     Left Hip   Positive SI compression.   Negative SI distraction.   SLR: Negative.     Right Hip   SLR: Negative.     Additional Tests Details  SI compression and distraction positive for LBP  Left SI more prominent in prone    Lisa LumbarTest   Static tests   Lying prone: no change  Lying prone on elbows:  \"strain\" in low back    Lying repeated motions:   Flexion in lying     Symptoms during testing: decreases  Extension in lying     Symptoms during testing: increases        Therapeutic Exercises (CPT 87710):     1. Hooklying hip add squeeze    2. Bridges    3. Hooklying LTR    4. Hooklying TA brace    5. SKC    6. Chair squat      Therapeutic Exercise Summary: Provided handout with these exercises for daily HEP.      Time-based treatments/modalities:  Therapeutic exercise minutes (CPT 79945): 8 minutes       Assessment, Response and Plan:   Impairments: lacks appropriate home exercise program, limited mobility and pain with function    Assessment details:  34 y.o. female presents with lumbosacral pain s/p fall.  Pt demonstrates some symptoms of SIJ dysfunction and limited lumbar stabilization strength.  Pt will benefit from skilled PT services to improve SIJ mobility, " core strength, and increase functional mobility tolerance.  Prognosis: fair    Goals:   Short Term Goals:   1. Pt able to walk > 30 min with < 4/10 pain.  2. Pt will demonstrate standing lumbar flexion AROM greater than 50% without increased symptoms.  3. Pt will be independent with daily HEP.  Short term goal time span:  2-4 weeks      Long Term Goals:    1. Pt will marbella work tasks through full shift with < 3/10 pain.  2. Pt able to walk 1 hour with < 4/10 pain.  3. B hip MMTs 5/5 without increased symptoms.  4. Pt will report increased function via improved scores on Rahul Richar Low Back Pain and Disability Questionnaire.  Long term goal time span:  6-8 weeks    Plan:   Therapy options:  Physical therapy treatment to continue  Planned therapy interventions:  Neuromuscular Re-education (CPT 98448), Therapeutic Exercise (CPT 98812) and E Stim Unattended (CPT 86504)  Frequency:  2x week  Duration in weeks:  8  Discussed with:  Patient      Functional Limitations and Severity Modifiers  Rahul Richar Low Back Pain and Disability Score: 37.5   Current:     Goal:       Referring provider co-signature:  I have reviewed this plan of care and my co-signature certifies the need for services.  Certification Dates:   From 4/25/19     To 7/10/19    Physician Signature: ________________________________ Date: ______________

## 2019-05-28 ENCOUNTER — PHYSICAL THERAPY (OUTPATIENT)
Dept: PHYSICAL THERAPY | Facility: REHABILITATION | Age: 34
End: 2019-05-28
Attending: FAMILY MEDICINE
Payer: MEDICAID

## 2019-05-28 DIAGNOSIS — M54.5 MIDLINE LOW BACK PAIN, UNSPECIFIED CHRONICITY, WITH SCIATICA PRESENCE UNSPECIFIED: ICD-10-CM

## 2019-05-28 PROCEDURE — 97110 THERAPEUTIC EXERCISES: CPT

## 2019-05-28 PROCEDURE — 97014 ELECTRIC STIMULATION THERAPY: CPT

## 2019-05-28 NOTE — OP THERAPY DAILY TREATMENT
Outpatient Physical Therapy  DAILY TREATMENT     [facility] Physical Therapy [address]  [address]  [place] [address]  Phone:  [phone]  Fax:  [phone]    Date: 05/28/2019    Patient: Kaylei Burciaga  YOB: 1985  MRN: [id number]     Time Calculation  Start time: 1033  Stop time: 1115 Time Calculation (min): 42 minutes     Chief Complaint: Back Injury    Visit #: 2    SUBJECTIVE:  20 min walk here today, currently 4/10, low back pain.  Bending and walking still aggravates symptoms.  Doing HEP, it helps some, but not as much as she had hoped it would.    OBJECTIVE:        Therapeutic Exercises (CPT 94398):     1. Tera stretch, 2x 30sec B, lumbar pain with R Tera position    2. Passive knee to opp shoulder stretch, x1 min B    3. SLR, 2x 10B    4. LTR at 90/90 with exercise ball, x10    5. L Tera and R SKC stretch, x30 sec x4    6. Hooklying hip add squeeze, 5sec x5, pt reports provides relief.      Therapeutic Exercise Summary: Add Tera with SKC stretch to HEP.    Therapeutic Treatments and Modalities:     1. E Stim Unattended (CPT 11828), lumbosacral IFC with MH in hooklying, x15 min    Time-based treatments/modalities:  Therapeutic exercise minutes (CPT 66179): 23 minutes       ASSESSMENT:   Response to treatment: Improved symptoms following treatment.    PLAN/RECOMMENDATIONS:   Plan for treatment: therapy treatment to continue next visit.  Planned interventions for next visit: continue with current treatment.

## 2019-05-30 ENCOUNTER — PHYSICAL THERAPY (OUTPATIENT)
Dept: PHYSICAL THERAPY | Facility: REHABILITATION | Age: 34
End: 2019-05-30
Attending: FAMILY MEDICINE
Payer: MEDICAID

## 2019-05-30 DIAGNOSIS — M54.5 MIDLINE LOW BACK PAIN, UNSPECIFIED CHRONICITY, WITH SCIATICA PRESENCE UNSPECIFIED: ICD-10-CM

## 2019-05-30 PROCEDURE — 97014 ELECTRIC STIMULATION THERAPY: CPT

## 2019-05-30 PROCEDURE — 97110 THERAPEUTIC EXERCISES: CPT

## 2019-05-30 NOTE — OP THERAPY DAILY TREATMENT
Outpatient Physical Therapy  DAILY TREATMENT     [facility] Physical [address]  [address]  [place] [address]  Phone:  [phone]  Fax:  [phone]    Date: 05/30/2019    Patient: Kaylie Burciaga  YOB: 1985  MRN: [id number]     Time Calculation  Start time: 1030  Stop time: 1110 Time Calculation (min): 40 minutes     Chief Complaint: Back Injury    Visit #: 3    SUBJECTIVE:  Walked 90 minutes yesterday, mid low back pain following walking. Still sore today.  Felt ok following last PT session.    OBJECTIVE:        Therapeutic Exercises (CPT 65155):     1. Prone hip IR/ER , x10B    2. Passive knee to opp shoulder stretch, x1 min B    3. SLR, 2x 10B    4. LTR at 90/90 with exercise ball, x10    5. L Tera and R SKC stretch, x15 sec x5    6. Hooklying hip add squeeze, 5sec x5    7. DKC with exercise ball, x10    8. Prone hip ext, x10B    9. Bridges, x10      Therapeutic Exercise Summary: STM B low lumbar paraspinals, PAs Gr II L 4-S1.    Therapeutic Treatments and Modalities:     1. E Stim Unattended (CPT 96636), lumbosacral IFC with MH in hooklying, x15 min    Time-based treatments/modalities:  Therapeutic exercise minutes (CPT 00190): 23 minutes       ASSESSMENT:   Response to treatment: Increased activity.    PLAN/RECOMMENDATIONS:   Plan for treatment: therapy treatment to continue next visit.  Planned interventions for next visit: continue with current treatment.

## 2019-06-04 ENCOUNTER — PHYSICAL THERAPY (OUTPATIENT)
Dept: PHYSICAL THERAPY | Facility: REHABILITATION | Age: 34
End: 2019-06-04
Attending: FAMILY MEDICINE
Payer: MEDICAID

## 2019-06-04 DIAGNOSIS — M54.5 MIDLINE LOW BACK PAIN, UNSPECIFIED CHRONICITY, WITH SCIATICA PRESENCE UNSPECIFIED: ICD-10-CM

## 2019-06-04 PROCEDURE — 97110 THERAPEUTIC EXERCISES: CPT

## 2019-06-04 PROCEDURE — 97014 ELECTRIC STIMULATION THERAPY: CPT

## 2019-06-04 NOTE — OP THERAPY DAILY TREATMENT
Outpatient Physical Therapy  DAILY TREATMENT     [facility] Physical [place] [address] [address]  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  It was stuck for a couple minutes. Then I had to bend forwards and loosen it up.       OBJECTIVE:  Current objective measures:       Patient reports 7/10 pain this day    Therapeutic Exercises (CPT 39915):     1. Cat/ camel     2. Butt to heels stretch     3. Supine gentle trunk rotation     4. Manual traction     5. Standing repeated extension 1 x6    6. Pelvic tilts with clocks     Therapeutic Treatments and Modalities:     1. E Stim Unattended (CPT 25231), IFC with contrast prone lying x 15 min    Time-based treatments/modalities:  Therapeutic exercise minutes (CPT 22106): 25 minutes       ASSESSMENT:   Response to treatment: Patient reports increased pain today due to painting and leaning over for extended periods.      PLAN/RECOMMENDATIONS:   Plan for treatment: therapy treatment to continue next visit.  Planned interventions for next visit: continue with current treatment.

## 2019-07-01 ENCOUNTER — TELEPHONE (OUTPATIENT)
Dept: PHYSICAL THERAPY | Facility: REHABILITATION | Age: 34
End: 2019-07-01

## 2019-07-01 NOTE — OP THERAPY DISCHARGE SUMMARY
Outpatient Physical Therapy  DISCHARGE SUMMARY NOTE      [facility] Physical Therapy [facility]  [address]  [address]  [place] [address]  Phone:  [phone]  Fax:  [phone]    Date of Visit: 07/01/2019    Patient: Kaylie Burciaga  YOB: 1985  MRN: [id number]     Referring Provider: Brent Randolph M.D.  [address]  [address]  [place] [address]   Referring Diagnosis Midline low back pain, unspecified chronicity, with sciatica presence unspecified [M54.5]     Physical Therapy Occurrence Codes    Date of onset of impairment:  1/15/19   Date physical therapy care plan established or reviewed:  4/25/19   Date physical therapy treatment started:  4/25/19          Your patient is being discharged from Physical Therapy with the following comments:   · Goals partially met, has not returned for follow up visits since 6/4.    Comments:  Some improvement in symptoms, continued exacerbation with some work activities.      Madina Perez, PT    Date: 7/1/2019

## 2019-12-16 ENCOUNTER — OFFICE VISIT (OUTPATIENT)
Dept: MEDICAL GROUP | Facility: MEDICAL CENTER | Age: 34
End: 2019-12-16
Attending: NURSE PRACTITIONER
Payer: MEDICAID

## 2019-12-16 VITALS
BODY MASS INDEX: 43.02 KG/M2 | HEIGHT: 64 IN | SYSTOLIC BLOOD PRESSURE: 122 MMHG | HEART RATE: 104 BPM | RESPIRATION RATE: 20 BRPM | DIASTOLIC BLOOD PRESSURE: 76 MMHG | OXYGEN SATURATION: 95 % | TEMPERATURE: 96.1 F | WEIGHT: 252 LBS

## 2019-12-16 DIAGNOSIS — B35.1 TOENAIL FUNGUS: ICD-10-CM

## 2019-12-16 DIAGNOSIS — J45.20 MILD INTERMITTENT ASTHMA WITHOUT COMPLICATION: ICD-10-CM

## 2019-12-16 DIAGNOSIS — Z13.228 SCREENING FOR METABOLIC DISORDER: ICD-10-CM

## 2019-12-16 DIAGNOSIS — Z13.0 SCREENING FOR DEFICIENCY ANEMIA: ICD-10-CM

## 2019-12-16 DIAGNOSIS — Z11.4 ENCOUNTER FOR SCREENING FOR HIV: ICD-10-CM

## 2019-12-16 DIAGNOSIS — Z11.3 ROUTINE SCREENING FOR STI (SEXUALLY TRANSMITTED INFECTION): ICD-10-CM

## 2019-12-16 DIAGNOSIS — F19.11 HISTORY OF SUBSTANCE ABUSE (HCC): ICD-10-CM

## 2019-12-16 DIAGNOSIS — G89.29 CHRONIC BILATERAL LOW BACK PAIN WITHOUT SCIATICA: ICD-10-CM

## 2019-12-16 DIAGNOSIS — M54.50 CHRONIC BILATERAL LOW BACK PAIN WITHOUT SCIATICA: ICD-10-CM

## 2019-12-16 DIAGNOSIS — Z13.21 ENCOUNTER FOR VITAMIN DEFICIENCY SCREENING: ICD-10-CM

## 2019-12-16 DIAGNOSIS — Z76.89 ENCOUNTER TO ESTABLISH CARE: ICD-10-CM

## 2019-12-16 DIAGNOSIS — Z13.6 SCREENING FOR CARDIOVASCULAR CONDITION: ICD-10-CM

## 2019-12-16 DIAGNOSIS — Z23 NEED FOR VACCINATION: ICD-10-CM

## 2019-12-16 PROBLEM — E87.6 HYPOKALEMIA: Status: RESOLVED | Noted: 2017-12-29 | Resolved: 2019-12-16

## 2019-12-16 PROBLEM — L60.0 INGROWN TOENAIL: Status: RESOLVED | Noted: 2017-01-30 | Resolved: 2019-12-16

## 2019-12-16 PROBLEM — R11.2 INTRACTABLE NAUSEA AND VOMITING: Status: RESOLVED | Noted: 2017-12-29 | Resolved: 2019-12-16

## 2019-12-16 PROBLEM — N39.0 UTI (URINARY TRACT INFECTION): Status: RESOLVED | Noted: 2017-12-29 | Resolved: 2019-12-16

## 2019-12-16 PROCEDURE — 99214 OFFICE O/P EST MOD 30 MIN: CPT | Performed by: NURSE PRACTITIONER

## 2019-12-16 PROCEDURE — 90715 TDAP VACCINE 7 YRS/> IM: CPT

## 2019-12-16 PROCEDURE — 90686 IIV4 VACC NO PRSV 0.5 ML IM: CPT

## 2019-12-16 PROCEDURE — 99213 OFFICE O/P EST LOW 20 MIN: CPT | Performed by: NURSE PRACTITIONER

## 2019-12-16 RX ORDER — LIDOCAINE 50 MG/G
1 OINTMENT TOPICAL PRN
Qty: 2 TUBE | Refills: 11 | Status: SHIPPED
Start: 2019-12-16 | End: 2020-01-23

## 2019-12-16 RX ORDER — BUPROPION HYDROCHLORIDE 300 MG/1
450 TABLET ORAL EVERY MORNING
COMMUNITY

## 2019-12-16 RX ORDER — DEXTROAMPHETAMINE SACCHARATE, AMPHETAMINE ASPARTATE MONOHYDRATE, DEXTROAMPHETAMINE SULFATE AND AMPHETAMINE SULFATE 2.5; 2.5; 2.5; 2.5 MG/1; MG/1; MG/1; MG/1
10 CAPSULE, EXTENDED RELEASE ORAL EVERY MORNING
COMMUNITY
End: 2020-08-24

## 2019-12-16 RX ORDER — ALBUTEROL SULFATE 90 UG/1
2 AEROSOL, METERED RESPIRATORY (INHALATION) EVERY 6 HOURS PRN
Qty: 8.5 G | Refills: 11 | Status: SHIPPED | OUTPATIENT
Start: 2019-12-16

## 2019-12-16 RX ORDER — BUPROPION HYDROCHLORIDE 150 MG/1
150 TABLET, EXTENDED RELEASE ORAL DAILY
COMMUNITY
End: 2020-02-07

## 2019-12-16 RX ORDER — QUETIAPINE FUMARATE 50 MG/1
50 TABLET, FILM COATED ORAL NIGHTLY PRN
COMMUNITY
End: 2020-01-23

## 2019-12-16 ASSESSMENT — ENCOUNTER SYMPTOMS
COUGH: 1
FEVER: 0
ABDOMINAL PAIN: 0
SHORTNESS OF BREATH: 1
WEIGHT LOSS: 0
CHILLS: 0
WHEEZING: 1
SPUTUM PRODUCTION: 0
CONSTIPATION: 0
DIARRHEA: 0
PALPITATIONS: 0
BLOOD IN STOOL: 0

## 2019-12-16 NOTE — ASSESSMENT & PLAN NOTE
Prior PCP: Agustín/ [facility]    Other Providers:  Psych-  Helena Mccullough at [facility]  PT- [facility]- completed x 2  GYN- Eder Shelton- plans for tubal, will get pap

## 2019-12-16 NOTE — ASSESSMENT & PLAN NOTE
Present for 4-5 years.  She has tried the ointment and soaking her nails in listerine.  She has not tried oral treatment.  Her right great and 4th toes are impacted by the fungus.  She is having pain with ambulation in her right great toe.  Her previous provider recommended removal of the toenail.  She was previously apprehensive, but believes she has gotten to the point where the toenail needs to be removed.  Denies fever and drainage.  Nuys trauma to the region.

## 2019-12-16 NOTE — ASSESSMENT & PLAN NOTE
Works as a  so this exacerbates her back pain.  She is currently in a substance abuse treatment program so her medication options are limited.  Ibuprofen, mobic, and flexeril were not working, so she stopped taking them.  The only thing that helps is lidocaine cream.  Denies incontinence and saddle anesthesia.  She reports taht her legs fall asleep quickly.

## 2019-12-16 NOTE — ASSESSMENT & PLAN NOTE
Dx as a baby.  She has run out of her inhaler, but if she had it she would use it several times per day.  She gets SOB and wheezy.  She wakes several times a week with SOB.  She is able to participate in ADLs.

## 2019-12-16 NOTE — PROGRESS NOTES
Chief Complaint   Patient presents with   • Establish Care   • Back Pain   • Abnormal Labs     creatinin   • Nail Problem       Subjective:     HPI:   Kaylie Burciaga is a 34 y.o. female here to establish care, low back pain,  and to discuss the evaluation and management of:      Chronic back pain  Works as a  so this exacerbates her back pain.  She is currently in a substance abuse treatment program so her medication options are limited.  Ibuprofen, mobic, and flexeril were not working, so she stopped taking them.  The only thing that helps is lidocaine cream.  Denies incontinence and saddle anesthesia.  She reports taht her legs fall asleep quickly.     Encounter to establish care  Prior PCP: Agustín/ [facility]    Other Providers:  Psych-  Helena Mccullough at [facility]  PT- [facility]- completed x 2  GYN- Eder Shelton- plans for tubal, will get pap    Toenail fungus  Present for 4-5 years.  She has tried the ointment and soaking her nails in listerine.  She has not tried oral treatment.  Her right great and 4th toes are impacted by the fungus.  She is having pain with ambulation in her right great toe.  Her previous provider recommended removal of the toenail.  She was previously apprehensive, but believes she has gotten to the point where the toenail needs to be removed.  Denies fever and drainage.  Nuys trauma to the region.    Mild intermittent asthma  Dx as a baby.  She has run out of her inhaler, but if she had it she would use it several times per day.  She gets SOB and wheezy.  She wakes several times a week with SOB.  She is able to participate in ADLs.        ROS  Review of Systems   Constitutional: Negative for chills, fever, malaise/fatigue and weight loss.   Respiratory: Positive for cough, shortness of breath and wheezing. Negative for sputum production.    Cardiovascular: Negative for chest pain, palpitations and leg swelling.   Gastrointestinal: Negative for abdominal pain, blood in  stool, constipation and diarrhea.         Allergies   Allergen Reactions   • Penicillins Anaphylaxis   • Morphine Rash     Rash only       Current medicines (including changes today)  Current Outpatient Medications   Medication Sig Dispense Refill   • buPROPion (WELLBUTRIN XL) 300 MG XL tablet Take 300 mg by mouth every morning.     • buPROPion SR (WELLBUTRIN-SR) 150 MG TABLET SR 12 HR sustained-release tablet Take 150 mg by mouth every day.     • amphetamine-dextroamphetamine XR (ADDERALL XR) 10 MG CAPSULE SR 24 HR Take 10 mg by mouth every morning.     • Amphetamine-Dextroamphetamine (AMPHETAMINE SALT COMBO PO) Take 5 mg by mouth 1 time daily as needed.     • quetiapine (SEROQUEL) 50 MG tablet Take 50 mg by mouth at bedtime as needed.     • lidocaine (XYLOCAINE) 5 % Ointment Apply 1 Each to affected area(s) as needed (back pain). 2 Tube 11   • albuterol 108 (90 Base) MCG/ACT Aero Soln inhalation aerosol Inhale 2 Puffs by mouth every 6 hours as needed for Shortness of Breath. 8.5 g 11   • Fluticasone Furoate (ARNUITY ELLIPTA) 100 MCG/ACT AEROSOL POWDER, BREATH ACTIVATED Inhale 1 Inhalation by mouth every day. 30 Each 11     No current facility-administered medications for this visit.      She  has a past medical history of Anxiety and depression, ASTHMA, bipolar, Blood transfusion (2010), Depression, Hypothyroidism due to acquired atrophy of thyroid (5/31/2016), and Nausea & vomiting (1/15/2012). She also has no past medical history of Addisons disease ([facility]), Adrenal disorder ([facility]), Allergy, Anemia, Arrhythmia, Arthritis, CATARACT, Clotting disorder ([facility]), Cushings syndrome ([facility]), Diabetic neuropathy ([facility]), EMPHYSEMA, Glaucoma, Goiter, Headache(784.0), Heart attack ([facility]), Heart murmur, HIV (human immunodeficiency virus infection), Hyperlipidemia, IBD (inflammatory bowel disease), Meningitis, Migraine, Muscle disorder, OSTEOPOROSIS, Parathyroid disorder ([facility]), Pituitary disease ([facility]), Seizure ([facility]), Sickle cell  "disease (HCC), Tuberculosis, Ulcer, or Urinary tract infection, site not specified.  She  has a past surgical history that includes gastroscopy with biopsy (11/29/08); other (T & A 5 yrs ago); other abdominal surgery; tonsillectomy; and marco by laparoscopy (12/1/08).  Social History     Tobacco Use   • Smoking status: Current Every Day Smoker     Packs/day: 0.50     Years: 11.00     Pack years: 5.50     Types: Cigarettes   • Smokeless tobacco: Never Used   • Tobacco comment:  Patient has cut down to less than half a pack a day.   Substance Use Topics   • Alcohol use: Not Currently   • Drug use: No       Family History   Problem Relation Age of Onset   • Diabetes Mother         Insulin   • Hypertension Mother    • Alcohol/Drug Mother      Family Status   Relation Name Status   • Mo  Alive   • Fa  Other        Father had restraining order from family   • MGMo  Other   • MGFa  Other   • PGMo  Other   • PGFa  Other       Patient Active Problem List    Diagnosis Date Noted   • Hypothyroidism due to acquired atrophy of thyroid 05/31/2016     Priority: Medium   • Tobacco abuse 04/11/2018     Priority: Low   • Encounter to establish care 12/16/2019   • Abdominal hernia 03/27/2018   • Chronic back pain 12/29/2017   • Leg swelling 11/09/2017   • Morbid obesity with BMI of 40.0-44.9, adult ([facility]) 02/28/2017   • Inappropriately low urine specific gravity 01/30/2017   • Sore throat 10/31/2016   • Dysmenorrhea 06/16/2016   • Vitamin D deficiency 05/31/2016   • Right-sided low back pain without sciatica 05/12/2016   • Psychiatric disorder 04/14/2016   • Mild intermittent asthma 04/14/2016   • Toenail fungus 04/14/2016   • Polysubstance abuse ([facility]) 01/15/2012          Objective:     /76 (BP Location: Left arm, Patient Position: Sitting, BP Cuff Size: Large adult)   Pulse (!) 104   Temp (!) 35.6 °C (96.1 °F) (Temporal)   Resp 20   Ht 1.626 m (5' 4\")   Wt 114.3 kg (252 lb)   SpO2 95%  Body mass index is 43.26 " kg/m².    Physical Exam:  Physical Exam   Constitutional: She is oriented to person, place, and time and well-developed, well-nourished, and in no distress. No distress.   HENT:   Head: Normocephalic.   Right Ear: Tympanic membrane and external ear normal.   Left Ear: Tympanic membrane and external ear normal.   Eyes: Pupils are equal, round, and reactive to light. Conjunctivae and EOM are normal.   Neck: Normal range of motion. Neck supple. No tracheal deviation present.   Cardiovascular: Normal rate, regular rhythm, normal heart sounds and intact distal pulses.   Pulmonary/Chest: Effort normal and breath sounds normal.   Abdominal: Soft. Bowel sounds are normal.   Musculoskeletal: Normal range of motion.   Lymphadenopathy:        Head (right side): No preauricular adenopathy present.        Head (left side): No preauricular adenopathy present.     She has no cervical adenopathy.   Neurological: She is alert and oriented to person, place, and time. She has normal sensation, normal strength and intact cranial nerves. Gait normal.   Skin: Skin is warm and dry.   Right great toe and fourth toe show thickening, flaking, and yellowed nails.  Her cuticle region on her right great toe is erythematous, no drainage or edema.   Psychiatric: Affect and judgment normal.        I have placed the below orders and discussed them with an approved delegating provider.  The MA is performing the below orders under the direction of Dr. Joiner.    Assessment and Plan:     The following treatment plan was discussed:    1. Mild intermittent asthma without complication  albuterol 108 (90 Base) MCG/ACT Aero Soln inhalation aerosol    Fluticasone Furoate (ARNUITY ELLIPTA) 100 MCG/ACT AEROSOL POWDER, BREATH ACTIVATED  -Chronic problem, uncontrolled.  She has been without a rescue inhaler for some time.  She is having daily symptoms.  I would like to start her on Arnuity daily albuterol as needed.  We discussed that Arnuity would not help  her during an asthma attack, patient verbalized understanding.  I have asked the patient to pay attention to her rescue inhaler use, night wakening, and ADLs.  She will follow-up in 1 month with me to determine efficacy of this treatment plan.  ER precautions discussed.   2. Chronic bilateral low back pain without sciatica  REFERRAL TO PHYSIATRY (PMR)    lidocaine (XYLOCAINE) 5 % Ointment  -Chronic problem, uncontrolled.  Patient is having daily back pain with no relief from medications.  Her treatment options are limited as she is on parole.  She is not interested in narcotic treatment, but is interested in exploring nonsurgical options with the physiatry wrist.  Physiatry referral placed.  Lidocaine ointment ordered.   3. Toenail fungus  Complete metabolic panel  -I will check her liver function, if okay will initiate oral terbinafine treatment.  I would like her to be on the oral terbinafine for approximately 4 to 6 weeks prior to removing her toenail.  We will schedule toenail removal appointment 8 weeks from now.   4. Need for vaccination  Tdap Vaccine =>6YO IM    Influenza Vaccine Quad Injection (PF)   5. Encounter to establish care     6. Screening for metabolic disorder  Comp Metabolic Panel    HEMOGLOBIN A1C    MICROALBUMIN CREAT RATIO URINE    TSH WITH REFLEX TO FT4   7. Screening for cardiovascular condition  Lipid Profile   8. Screening for deficiency anemia  CBC WITHOUT DIFFERENTIAL   9. Encounter for vitamin deficiency screening  VITAMIN D,25 HYDROXY   10. Encounter for screening for HIV  HIV AG/AB COMBO ASSAY SCREENING   11. Routine screening for STI (sexually transmitted infection)  RPR (SYPHILIS)    Chlamydia/GC PCR Urine Or Swab   12. History of substance abuse (HCC)  HEP C VIRUS ANTIBODY       Any change or worsening of signs or symptoms, patient encouraged to follow-up or report to emergency room for further evaluation. Patient verbalizes understanding and agrees.    Follow-Up: Return in about 4  weeks (around 1/13/2020) for Asthma.  I will contact the patient with lab results.  Pending CMP results will prescribe terbinafine oral treatment.      PLEASE NOTE: This dictation was created using voice recognition software. I have made every reasonable attempt to correct obvious errors, but I expect that there are errors of grammar and possibly content that I did not discover before finalizing the note.

## 2019-12-24 ENCOUNTER — OFFICE VISIT (OUTPATIENT)
Dept: MEDICAL GROUP | Facility: MEDICAL CENTER | Age: 34
End: 2019-12-24
Attending: FAMILY MEDICINE
Payer: MEDICAID

## 2019-12-24 VITALS
BODY MASS INDEX: 43.54 KG/M2 | TEMPERATURE: 97 F | OXYGEN SATURATION: 95 % | RESPIRATION RATE: 16 BRPM | DIASTOLIC BLOOD PRESSURE: 68 MMHG | HEART RATE: 84 BPM | SYSTOLIC BLOOD PRESSURE: 114 MMHG | HEIGHT: 64 IN | WEIGHT: 255 LBS

## 2019-12-24 DIAGNOSIS — E66.9 OBESITY (BMI 30-39.9): ICD-10-CM

## 2019-12-24 DIAGNOSIS — J01.10 ACUTE NON-RECURRENT FRONTAL SINUSITIS: ICD-10-CM

## 2019-12-24 PROCEDURE — 99212 OFFICE O/P EST SF 10 MIN: CPT | Performed by: FAMILY MEDICINE

## 2019-12-24 PROCEDURE — 99213 OFFICE O/P EST LOW 20 MIN: CPT | Performed by: FAMILY MEDICINE

## 2019-12-24 RX ORDER — METHYLPREDNISOLONE 4 MG/1
TABLET ORAL
Qty: 21 TAB | Refills: 0 | Status: SHIPPED | OUTPATIENT
Start: 2019-12-24 | End: 2020-02-07

## 2019-12-24 RX ORDER — AZITHROMYCIN 250 MG/1
TABLET, FILM COATED ORAL
Qty: 6 TAB | Refills: 0 | Status: SHIPPED
Start: 2019-12-24 | End: 2020-01-23

## 2019-12-24 NOTE — PROGRESS NOTES
"Subjective:      Kaylie Burciaga is a 34 y.o. female who presents with Sinusitis            HPI 1.  Acute sinusitis-patient reports onset about 9 days ago of clear nasal discharge with rapidly escalating mid facial pain.  She also reports of frequent cough with occasional yellow phlegm has developed in the past several days.  She does have a background history of asthma.  She has started using an albuterol inhaler but has been hesitant to start her new Arnuity dry powder inhaler recently.  2.  Obesity-patient reports that she switched to diet soda 2 months ago.  She also reports that she only takes in 1 real meal per day.  Does tend to have nighttime snacks.  ROS negative for nausea, emesis, diarrhea.  Positive for sore throat       Objective:     /68 (BP Location: Left arm, Patient Position: Sitting, BP Cuff Size: Large adult)   Pulse 84   Temp 36.1 °C (97 °F) (Temporal)   Resp 16   Ht 1.626 m (5' 4.02\")   Wt 115.7 kg (255 lb)   LMP 12/02/2019 (Within Days)   SpO2 95%   BMI 43.75 kg/m²      Physical Exam  General- alert,cooperative patient in no acute distress  Ears- normal tms without redness, perforation. Canals unremarkable  Nares- clear, pink, moist mucosa without bleeding. No purulent nasal DC  Orophx.- lips normal. Clear, pink, moist mucosa without redness or exudate. Tongue is midline  Chest-Normal to auscultation and percussion, movement is symmetric. Nontender to palpation.  Sinuses-1+ tender over the frontal and 2+ tender over the maxillary areas bilaterally            Assessment/Plan:       1. Obesity (BMI 30-39.9)    - TSH; Future    2. Acute non-recurrent frontal sinusitis    Plan: 1.  Check updated TSH  2.  Zithromax Z-VAL  3.  Medrol Dosepak and begin Arnuity at its completion  "

## 2020-01-07 NOTE — ASSESSMENT & PLAN NOTE
Pt is overweight. We discussed importance of her taking her Synthroid medication and   For her to reduce sugar/carb.    
Pt reports nausea for about 10 days after finding out her Dad had past away.  No diarrhea. Does have acid reflux as well.  Denies abd pain or dark stools.     
Pt reports not taking not taking Meloxicam as did not want to take as is also Excedrin for headaches.   
Pt reports she has been taking her Synthroid 25 mcg since she re-started at my recommendation at last visit  As she had stopped taking it on her own.  She reports  Will order f/u TSH for her to complete  
Pt reports she needs refills on albuterol inhaler and nebulizer.  She denies cough or wheezing recently.  Is not ready to quit smoking.  
Pt reports she only had to take a few days worth of diuretic and her   Leg swelling resolved.  
no abdominal pain, no bloating, no constipation, no diarrhea, no nausea and no vomiting.

## 2020-01-08 ENCOUNTER — HOSPITAL ENCOUNTER (OUTPATIENT)
Dept: LAB | Facility: MEDICAL CENTER | Age: 35
End: 2020-01-08
Attending: FAMILY MEDICINE
Payer: MEDICAID

## 2020-01-08 ENCOUNTER — HOSPITAL ENCOUNTER (OUTPATIENT)
Dept: LAB | Facility: MEDICAL CENTER | Age: 35
End: 2020-01-08
Attending: NURSE PRACTITIONER
Payer: MEDICAID

## 2020-01-08 DIAGNOSIS — F19.11 HISTORY OF SUBSTANCE ABUSE (HCC): ICD-10-CM

## 2020-01-08 DIAGNOSIS — Z11.4 ENCOUNTER FOR SCREENING FOR HIV: ICD-10-CM

## 2020-01-08 DIAGNOSIS — E66.9 OBESITY (BMI 30-39.9): ICD-10-CM

## 2020-01-08 DIAGNOSIS — Z11.3 ROUTINE SCREENING FOR STI (SEXUALLY TRANSMITTED INFECTION): ICD-10-CM

## 2020-01-08 DIAGNOSIS — Z13.228 SCREENING FOR METABOLIC DISORDER: ICD-10-CM

## 2020-01-08 DIAGNOSIS — Z13.0 SCREENING FOR DEFICIENCY ANEMIA: ICD-10-CM

## 2020-01-08 DIAGNOSIS — Z13.6 SCREENING FOR CARDIOVASCULAR CONDITION: ICD-10-CM

## 2020-01-08 DIAGNOSIS — Z13.21 ENCOUNTER FOR VITAMIN DEFICIENCY SCREENING: ICD-10-CM

## 2020-01-08 LAB
ALBUMIN SERPL BCP-MCNC: 3.9 G/DL (ref 3.2–4.9)
ALBUMIN/GLOB SERPL: 1.4 G/DL
ALP SERPL-CCNC: 63 U/L (ref 30–99)
ALT SERPL-CCNC: 14 U/L (ref 2–50)
ANION GAP SERPL CALC-SCNC: 7 MMOL/L (ref 0–11.9)
AST SERPL-CCNC: 16 U/L (ref 12–45)
BILIRUB SERPL-MCNC: 0.3 MG/DL (ref 0.1–1.5)
BUN SERPL-MCNC: 13 MG/DL (ref 8–22)
C TRACH DNA SPEC QL NAA+PROBE: NEGATIVE
CALCIUM SERPL-MCNC: 9.1 MG/DL (ref 8.5–10.5)
CHLORIDE SERPL-SCNC: 105 MMOL/L (ref 96–112)
CHOLEST SERPL-MCNC: 221 MG/DL (ref 100–199)
CO2 SERPL-SCNC: 25 MMOL/L (ref 20–33)
CREAT SERPL-MCNC: 0.88 MG/DL (ref 0.5–1.4)
CREAT UR-MCNC: 13.9 MG/DL
ERYTHROCYTE [DISTWIDTH] IN BLOOD BY AUTOMATED COUNT: 49.9 FL (ref 35.9–50)
GLOBULIN SER CALC-MCNC: 2.7 G/DL (ref 1.9–3.5)
GLUCOSE SERPL-MCNC: 104 MG/DL (ref 65–99)
HCT VFR BLD AUTO: 38.1 % (ref 37–47)
HDLC SERPL-MCNC: 38 MG/DL
HGB BLD-MCNC: 11.4 G/DL (ref 12–16)
LDLC SERPL CALC-MCNC: 119 MG/DL
MCH RBC QN AUTO: 26.7 PG (ref 27–33)
MCHC RBC AUTO-ENTMCNC: 29.9 G/DL (ref 33.6–35)
MCV RBC AUTO: 89.2 FL (ref 81.4–97.8)
MICROALBUMIN UR-MCNC: <0.7 MG/DL
MICROALBUMIN/CREAT UR: NORMAL MG/G (ref 0–30)
N GONORRHOEA DNA SPEC QL NAA+PROBE: NEGATIVE
PLATELET # BLD AUTO: 316 K/UL (ref 164–446)
PMV BLD AUTO: 10.6 FL (ref 9–12.9)
POTASSIUM SERPL-SCNC: 4 MMOL/L (ref 3.6–5.5)
PROT SERPL-MCNC: 6.6 G/DL (ref 6–8.2)
RBC # BLD AUTO: 4.27 M/UL (ref 4.2–5.4)
SODIUM SERPL-SCNC: 137 MMOL/L (ref 135–145)
SPECIMEN SOURCE: NORMAL
TRIGL SERPL-MCNC: 318 MG/DL (ref 0–149)
TSH SERPL DL<=0.005 MIU/L-ACNC: 3.78 UIU/ML (ref 0.38–5.33)
WBC # BLD AUTO: 6.2 K/UL (ref 4.8–10.8)

## 2020-01-08 PROCEDURE — 87389 HIV-1 AG W/HIV-1&-2 AB AG IA: CPT

## 2020-01-08 PROCEDURE — 82043 UR ALBUMIN QUANTITATIVE: CPT

## 2020-01-08 PROCEDURE — 87491 CHLMYD TRACH DNA AMP PROBE: CPT

## 2020-01-08 PROCEDURE — 86780 TREPONEMA PALLIDUM: CPT

## 2020-01-08 PROCEDURE — 87591 N.GONORRHOEAE DNA AMP PROB: CPT

## 2020-01-08 PROCEDURE — 85027 COMPLETE CBC AUTOMATED: CPT

## 2020-01-08 PROCEDURE — 84443 ASSAY THYROID STIM HORMONE: CPT | Mod: 91

## 2020-01-08 PROCEDURE — 83036 HEMOGLOBIN GLYCOSYLATED A1C: CPT

## 2020-01-08 PROCEDURE — 82306 VITAMIN D 25 HYDROXY: CPT

## 2020-01-08 PROCEDURE — 80061 LIPID PANEL: CPT

## 2020-01-08 PROCEDURE — 80053 COMPREHEN METABOLIC PANEL: CPT

## 2020-01-08 PROCEDURE — 86803 HEPATITIS C AB TEST: CPT

## 2020-01-08 PROCEDURE — 82570 ASSAY OF URINE CREATININE: CPT

## 2020-01-08 PROCEDURE — 84443 ASSAY THYROID STIM HORMONE: CPT

## 2020-01-08 PROCEDURE — 36415 COLL VENOUS BLD VENIPUNCTURE: CPT

## 2020-01-09 ENCOUNTER — TELEPHONE (OUTPATIENT)
Dept: MEDICAL GROUP | Facility: MEDICAL CENTER | Age: 35
End: 2020-01-09

## 2020-01-09 DIAGNOSIS — E55.9 VITAMIN D DEFICIENCY: ICD-10-CM

## 2020-01-09 DIAGNOSIS — B35.1 TOENAIL FUNGUS: ICD-10-CM

## 2020-01-09 LAB
25(OH)D3 SERPL-MCNC: 15 NG/ML (ref 30–100)
EST. AVERAGE GLUCOSE BLD GHB EST-MCNC: 111 MG/DL
HBA1C MFR BLD: 5.5 % (ref 0–5.6)
HCV AB SER QL: NEGATIVE
HIV 1+2 AB+HIV1 P24 AG SERPL QL IA: NON REACTIVE
TREPONEMA PALLIDUM IGG+IGM AB [PRESENCE] IN SERUM OR PLASMA BY IMMUNOASSAY: NON REACTIVE
TSH SERPL DL<=0.005 MIU/L-ACNC: 3.65 UIU/ML (ref 0.38–5.33)

## 2020-01-09 RX ORDER — TERBINAFINE HYDROCHLORIDE 250 MG/1
250 TABLET ORAL DAILY
Qty: 30 TAB | Refills: 3 | Status: SHIPPED
Start: 2020-01-09 | End: 2020-08-24

## 2020-01-09 RX ORDER — ERGOCALCIFEROL 1.25 MG/1
50000 CAPSULE ORAL
Qty: 12 CAP | Refills: 1 | Status: SHIPPED | OUTPATIENT
Start: 2020-01-09

## 2020-01-09 NOTE — PROGRESS NOTES
Vitamin D level less than 20, ergocalciferol 50,000 units weekly sent to pharmacy.  CMP showed normal liver function, will start terbinafine treatment for her onychomycosis of her toenails with pain.

## 2020-01-09 NOTE — TELEPHONE ENCOUNTER
Phone Number Called: [phone] (home)       Call outcome: left message for patient to call back regarding message below    Message: Please call pt and let them know that I got her lab results.  She is negative for HIV, hepatitis C, syphilis, chlamydia, and gonorrhea.  She is not anemic.  Her thyroid function is normal.  She does not have diabetes.  Her kidney function is normal.  Her liver function is normal.  Her vitamin D level is low, I will call in a once a week prescription of vitamin D.  Your total, triglyceride, and LDL cholesterol labs are elevated, her HDL or healthy fats are low.  To improve this we recommend increasing physical activity (goal is 30 minutes of exercise 5 days a week), decrease the amount of saturated fats and cholesterol found in your diet (especially from pork and beef sources), and increase the amount of the good fats in your diet like salmon, avocado, olive oil.  I will call in the toenail fungus medication for her to start.

## 2020-01-09 NOTE — RESULT ENCOUNTER NOTE
Please call pt and let them know that I got her lab results.  She is negative for HIV, hepatitis C, syphilis, chlamydia, and gonorrhea.  She is not anemic.  Her thyroid function is normal.  She does not have diabetes.  Her kidney function is normal.  Her liver function is normal.  Her vitamin D level is low, I will call in a once a week prescription of vitamin D.  Your total, triglyceride, and LDL cholesterol labs are elevated, her HDL or healthy fats are low.  To improve this we recommend increasing physical activity (goal is 30 minutes of exercise 5 days a week), decrease the amount of saturated fats and cholesterol found in your diet (especially from pork and beef sources), and increase the amount of the good fats in your diet like salmon, avocado, olive oil.  I will call in the toenail fungus medication for her to start.

## 2020-01-13 NOTE — TELEPHONE ENCOUNTER
Phone Number Called: [phone] (home)     Call outcome: left message for patient to call back regarding message below    Message: Left VM To give us a call back to go over Results

## 2020-01-14 NOTE — TELEPHONE ENCOUNTER
Phone Number Called: [phone] (home) ( 3rd attempt)      Call outcome: left message for patient to call back regarding message below    Message: Please call pt and let them know that I got her lab results.  She is negative for HIV, hepatitis C, syphilis, chlamydia, and gonorrhea.  She is not anemic.  Her thyroid function is normal.  She does not have diabetes.  Her kidney function is normal.  Her liver function is normal.  Her vitamin D level is low, I will call in a once a week prescription of vitamin D.  Your total, triglyceride, and LDL cholesterol labs are elevated, her HDL or healthy fats are low.  To improve this we recommend increasing physical activity (goal is 30 minutes of exercise 5 days a week), decrease the amount of saturated fats and cholesterol found in your diet (especially from pork and beef sources), and increase the amount of the good fats in your diet like salmon, avocado, olive oil.  I will call in the toenail fungus medication for her to start.

## 2020-01-23 ENCOUNTER — OFFICE VISIT (OUTPATIENT)
Dept: MEDICAL GROUP | Facility: MEDICAL CENTER | Age: 35
End: 2020-01-23
Attending: NURSE PRACTITIONER
Payer: MEDICAID

## 2020-01-23 VITALS
RESPIRATION RATE: 18 BRPM | DIASTOLIC BLOOD PRESSURE: 64 MMHG | TEMPERATURE: 97.8 F | SYSTOLIC BLOOD PRESSURE: 110 MMHG | OXYGEN SATURATION: 98 % | HEIGHT: 64 IN | WEIGHT: 267.3 LBS | HEART RATE: 80 BPM | BODY MASS INDEX: 45.64 KG/M2

## 2020-01-23 DIAGNOSIS — G89.29 CHRONIC RIGHT-SIDED LOW BACK PAIN WITHOUT SCIATICA: ICD-10-CM

## 2020-01-23 DIAGNOSIS — J45.20 MILD INTERMITTENT ASTHMA WITHOUT COMPLICATION: ICD-10-CM

## 2020-01-23 DIAGNOSIS — M54.50 CHRONIC RIGHT-SIDED LOW BACK PAIN WITHOUT SCIATICA: ICD-10-CM

## 2020-01-23 PROCEDURE — 99214 OFFICE O/P EST MOD 30 MIN: CPT | Performed by: NURSE PRACTITIONER

## 2020-01-23 PROCEDURE — 99213 OFFICE O/P EST LOW 20 MIN: CPT | Performed by: NURSE PRACTITIONER

## 2020-01-23 RX ORDER — TRAZODONE HYDROCHLORIDE 50 MG/1
50 TABLET ORAL NIGHTLY PRN
COMMUNITY

## 2020-01-23 RX ORDER — NITROFURANTOIN 25; 75 MG/1; MG/1
100 CAPSULE ORAL
COMMUNITY
Start: 2020-01-15 | End: 2020-02-19

## 2020-01-23 RX ORDER — TRAZODONE HYDROCHLORIDE 50 MG/1
TABLET ORAL
COMMUNITY
Start: 2020-01-20 | End: 2020-02-19

## 2020-01-23 RX ORDER — IBUPROFEN 800 MG/1
800 TABLET ORAL EVERY 8 HOURS PRN
Qty: 90 TAB | Refills: 5 | Status: SHIPPED | OUTPATIENT
Start: 2020-01-23

## 2020-01-23 RX ORDER — TRAMADOL HYDROCHLORIDE 50 MG/1
50 TABLET ORAL EVERY 6 HOURS PRN
Qty: 36 TAB | Refills: 0 | Status: SHIPPED | OUTPATIENT
Start: 2020-01-23 | End: 2020-01-23

## 2020-01-23 RX ORDER — PRAZOSIN HYDROCHLORIDE 2 MG/1
2 CAPSULE ORAL EVERY EVENING
COMMUNITY
Start: 2020-01-20

## 2020-01-23 RX ORDER — TRAMADOL HYDROCHLORIDE 50 MG/1
50 TABLET ORAL EVERY 6 HOURS PRN
Qty: 28 TAB | Refills: 0 | Status: SHIPPED | OUTPATIENT
Start: 2020-01-23 | End: 2020-01-30

## 2020-01-23 RX ORDER — DEXTROAMPHETAMINE SACCHARATE, AMPHETAMINE ASPARTATE, DEXTROAMPHETAMINE SULFATE AND AMPHETAMINE SULFATE 2.5; 2.5; 2.5; 2.5 MG/1; MG/1; MG/1; MG/1
TABLET ORAL
COMMUNITY
Start: 2020-01-20 | End: 2020-02-19

## 2020-01-23 RX ORDER — DEXTROAMPHETAMINE SACCHARATE, AMPHETAMINE ASPARTATE, DEXTROAMPHETAMINE SULFATE AND AMPHETAMINE SULFATE 1.25; 1.25; 1.25; 1.25 MG/1; MG/1; MG/1; MG/1
TABLET ORAL
COMMUNITY
Start: 2020-01-20 | End: 2020-02-07

## 2020-01-23 RX ORDER — ACYCLOVIR 800 MG/1
TABLET ORAL
COMMUNITY
Start: 2020-01-09 | End: 2020-08-24

## 2020-01-23 RX ORDER — ACETAMINOPHEN 500 MG
1000 TABLET ORAL 2 TIMES DAILY
Qty: 60 TAB | Refills: 5 | Status: SHIPPED
Start: 2020-01-23 | End: 2020-05-25

## 2020-01-23 RX ORDER — BUPROPION HYDROCHLORIDE 150 MG/1
TABLET ORAL
COMMUNITY
Start: 2020-01-20 | End: 2020-02-19

## 2020-01-23 RX ORDER — NAPROXEN 500 MG/1
TABLET ORAL
COMMUNITY
Start: 2020-01-09 | End: 2020-02-19

## 2020-01-23 RX ORDER — LIDOCAINE 50 MG/G
1 PATCH TOPICAL EVERY 24 HOURS
Qty: 30 PATCH | Refills: 3 | Status: ON HOLD
Start: 2020-01-23 | End: 2020-05-29

## 2020-01-23 ASSESSMENT — ENCOUNTER SYMPTOMS
FEVER: 0
COUGH: 0
DIARRHEA: 0
CONSTIPATION: 0
WEIGHT LOSS: 0
PALPITATIONS: 0
ABDOMINAL PAIN: 0
CHILLS: 0
BLOOD IN STOOL: 0
SHORTNESS OF BREATH: 1
WHEEZING: 1

## 2020-01-23 ASSESSMENT — PATIENT HEALTH QUESTIONNAIRE - PHQ9: CLINICAL INTERPRETATION OF PHQ2 SCORE: 0

## 2020-01-23 ASSESSMENT — PAIN SCALES - GENERAL: PAINLEVEL: 6=MODERATE PAIN

## 2020-01-23 NOTE — LETTER
January 23, 2020       Patient: Kaylie Burciaga   YOB: 1985   Date of Visit: 1/23/2020         To Whom It May Concern:    It is my medical opinion that Kaylie Burciaga remain out of work until 1/8/2020 through 2/11/2020.    If you have any questions or concerns, please don't hesitate to call [phone]          Sincerely,          JESSICA Marie.P.R.N.  Electronically Signed

## 2020-01-23 NOTE — LETTER
January 23, 2020       Patient: Kaylie Burciaga   YOB: 1985   Date of Visit: 1/23/2020         To Whom It May Concern:    It is my medical opinion that Kaylie Burciaga remain out of work from 1/8/2020 through 2/11/2020.    If you have any questions or concerns, please don't hesitate to call [phone]          Sincerely,          Helena Ann, JESSICA.P.R.N.  Electronically Signed

## 2020-01-23 NOTE — LETTER
[facility]  LUCERO Marie.  [address]  [place] [address]  Fax: [phone]   Authorization for Release/Disclosure of   Protected Health Information   Name: KAYLIE WHITFIELD : 1985 SSN: [id number]   Address: [address] [place] [address]  [address]  [place] [address] Phone:    [phone] (home)    I authorize the entity listed below to release/disclose the PHI below to:   [facility]/OLINDA Marie and OLINDA Marie   Provider or Entity Name:  [facility]   Address   City, [facility], [facility]   Phone:      Fax:     Reason for request: continuity of care   Information to be released:    [  ] LAST COLONOSCOPY,  including any PATH REPORT and follow-up  [  ] LAST FIT/COLOGUARD RESULT [  ] LAST DEXA  [  ] LAST MAMMOGRAM  [  ] LAST PAP  [  ] LAST LABS [  ] RETINA EXAM REPORT  [  ] IMMUNIZATION RECORDS  [X  ] Release all info      [  ] Check here and initial the line next to each item to release ALL health information INCLUDING  _____ Care and treatment for drug and / or alcohol abuse  _____ HIV testing, infection status, or AIDS  _____ Genetic Testing    DATES OF SERVICE OR TIME PERIOD TO BE DISCLOSED: _____________  I understand and acknowledge that:  * This Authorization may be revoked at any time by you in writing, except if your health information has already been used or disclosed.  * Your health information that will be used or disclosed as a result of you signing this authorization could be re-disclosed by the recipient. If this occurs, your re-disclosed health information may no longer be protected by State or Federal laws.  * You may refuse to sign this Authorization. Your refusal will not affect your ability to obtain treatment.  * This Authorization becomes effective upon signing and will  on (date) __________.      If no date is indicated, this Authorization will  one (1) year from the signature date.    Name: Kaylie Whitfield    Signature:   Date:          1/23/2020       PLEASE FAX REQUESTED RECORDS BACK TO: [phone]

## 2020-01-23 NOTE — ASSESSMENT & PLAN NOTE
She was not able to start her arnuity as she was sick and then lost the inhaler.  She is using her rescue several times a week.  She is not waking at [facility] SOB.  She is able to do her ADLs.

## 2020-01-23 NOTE — ASSESSMENT & PLAN NOTE
Still having bad back pain.  She has not been able to work for 2 weeks 2/2 pain . She went to urgent Care and they gave her steroids and a tramadol shot.  She has completed the steroids the gave her.  She has been taking naproxen and Excedrin with no relief.  Lidocaine and muscle rub have not helped.  She has gained weight from inactivity and she is concerned.  She does have sciatica, incontinence, or saddle anesthesia.  She has can only do activity for about 5 minutes before she has to stop 2/2 pain.

## 2020-01-23 NOTE — PROGRESS NOTES
Chief Complaint   Patient presents with   • Asthma   • Back Pain       Subjective:     HPI:   Kaylie Burciaga is a 35 y.o. female here to discuss the evaluation and management of:        Mild intermittent asthma  She was not able to start her arnuity as she was sick and then lost the inhaler.  She is using her rescue several times a week.  She is not waking at NOC SOB.  She is able to do her ADLs.      Right-sided low back pain without sciatica  Still having bad back pain.  She has not been able to work for 2 weeks 2/2 pain . She went to urgent Care and they gave her steroids and a tramadol shot.  She has completed the steroids the gave her.  She has been taking naproxen and Excedrin with no relief.  Lidocaine and muscle rub have not helped.  She has gained weight from inactivity and she is concerned.  She does have sciatica, incontinence, or saddle anesthesia.  She has can only do activity for about 5 minutes before she has to stop 2/2 pain.        ROS  Review of Systems   Constitutional: Negative for chills, fever, malaise/fatigue and weight loss.   Respiratory: Positive for shortness of breath and wheezing. Negative for cough.    Cardiovascular: Negative for chest pain, palpitations and leg swelling.   Gastrointestinal: Negative for abdominal pain, blood in stool, constipation and diarrhea.         Allergies   Allergen Reactions   • Penicillins Anaphylaxis   • Morphine Rash     Rash only       Current medicines (including changes today)  Current Outpatient Medications   Medication Sig Dispense Refill   • traZODone (DESYREL) 100 MG Tab Take  mg by mouth every evening.     • Fluticasone Furoate (ARNUITY ELLIPTA) 100 MCG/ACT AEROSOL POWDER, BREATH ACTIVATED Inhale 1 Inhalation by mouth every day. 30 Each 11   • lidocaine (LIDODERM) 5 % Patch Apply 1 Patch to skin as directed every 24 hours. Wear patch for 12 hours then remove. 30 Patch 3   • ibuprofen (MOTRIN) 800 MG Tab Take 1 Tab by mouth every 8  hours as needed. Take with food. 90 Tab 5   • acetaminophen (TYLENOL) 500 MG Tab Take 2 Tabs by mouth 2 Times a Day. 60 Tab 5   • tramadol (ULTRAM) 50 MG Tab Take 1 Tab by mouth every 6 hours as needed for Moderate Pain for up to 7 days. 28 Tab 0   • terbinafine (LAMISIL) 250 MG Tab Take 1 Tab by mouth every day. 30 Tab 3   • ergocalciferol (DRISDOL) 90777 UNIT capsule Take 1 Cap by mouth every 7 days. 12 Cap 1   • methylPREDNISolone (MEDROL DOSEPAK) 4 MG Tablet Therapy Pack 6 by mouth daily 1, 5 by mouth day 2, 4 by mouth day 3, 3 by mouth daily 4, 2 by mouth day 5, 1 by mouth day 6 21 Tab 0   • buPROPion (WELLBUTRIN XL) 300 MG XL tablet Take 300 mg by mouth every morning.     • buPROPion SR (WELLBUTRIN-SR) 150 MG TABLET SR 12 HR sustained-release tablet Take 150 mg by mouth every day.     • amphetamine-dextroamphetamine XR (ADDERALL XR) 10 MG CAPSULE SR 24 HR Take 10 mg by mouth every morning.     • Amphetamine-Dextroamphetamine (AMPHETAMINE SALT COMBO PO) Take 5 mg by mouth 1 time daily as needed.     • albuterol 108 (90 Base) MCG/ACT Aero Soln inhalation aerosol Inhale 2 Puffs by mouth every 6 hours as needed for Shortness of Breath. 8.5 g 11   • acyclovir (ZOVIRAX) 800 MG Tab      • buPROPion (WELLBUTRIN XL) 150 MG XL tablet      • naproxen (NAPROSYN) 500 MG Tab      • prazosin (MINIPRESS) 2 MG Cap      • nitrofurantoin monohyd macro (MACROBID) 100 MG Cap Take 100 mg by mouth.     • amphetamine-dextroamphetamine (ADDERALL) 10 MG Tab      • amphetamine-dextroamphetamine (ADDERALL) 5 MG Tab      • traZODone (DESYREL) 50 MG Tab        No current facility-administered medications for this visit.        Social History     Tobacco Use   • Smoking status: Current Every Day Smoker     Packs/day: 0.50     Years: 11.00     Pack years: 5.50     Types: Cigarettes   • Smokeless tobacco: Never Used   • Tobacco comment:  Patient has cut down to less than half a pack a day.   Substance Use Topics   • Alcohol use: Not Currently  "  • Drug use: No       Patient Active Problem List    Diagnosis Date Noted   • Hypothyroidism due to acquired atrophy of thyroid 05/31/2016     Priority: Medium   • Tobacco abuse 04/11/2018     Priority: Low   • Encounter to establish care 12/16/2019   • Abdominal hernia 03/27/2018   • Chronic back pain 12/29/2017   • Leg swelling 11/09/2017   • Morbid obesity with BMI of 40.0-44.9, adult ([facility]) 02/28/2017   • Inappropriately low urine specific gravity 01/30/2017   • Sore throat 10/31/2016   • Dysmenorrhea 06/16/2016   • Vitamin D deficiency 05/31/2016   • Right-sided low back pain without sciatica 05/12/2016   • Psychiatric disorder 04/14/2016   • Mild intermittent asthma 04/14/2016   • Toenail fungus 04/14/2016   • Polysubstance abuse ([facility]) 01/15/2012       Family History   Problem Relation Age of Onset   • Diabetes Mother         Insulin   • Hypertension Mother    • Alcohol/Drug Mother           Objective:     /64 (BP Location: Left arm, Patient Position: Sitting, BP Cuff Size: Adult long)   Pulse 80   Temp 36.6 °C (97.8 °F) (Temporal)   Resp 18   Ht 1.626 m (5' 4\")   Wt 121.2 kg (267 lb 4.8 oz)   SpO2 98%  Body mass index is 45.88 kg/m².    Physical Exam:  Physical Exam   Constitutional: She is oriented to person, place, and time and well-developed, well-nourished, and in no distress. No distress.   HENT:   Head: Normocephalic.   Right Ear: Tympanic membrane and external ear normal.   Left Ear: Tympanic membrane and external ear normal.   Eyes: Pupils are equal, round, and reactive to light. Conjunctivae and EOM are normal.   Neck: Normal range of motion. Neck supple. No tracheal deviation present.   Cardiovascular: Normal rate, regular rhythm, normal heart sounds and intact distal pulses.   Pulmonary/Chest: Effort normal and breath sounds normal.   Abdominal: Soft. Bowel sounds are normal.   Musculoskeletal:      Lumbar back: She exhibits decreased range of motion, tenderness and pain. "   Lymphadenopathy:        Head (right side): No preauricular adenopathy present.        Head (left side): No preauricular adenopathy present.     She has no cervical adenopathy.   Neurological: She is alert and oriented to person, place, and time. She has normal sensation, normal strength and intact cranial nerves. Gait normal.   Skin: Skin is warm and dry.   Psychiatric: Affect and judgment normal.     Acute pain   This is a recurrent problem.   Patient is complaining of pain x 2 weeks of significantly increased pain located in her low right back.  Pain is constant, described as throbbing and a 8/10 on the pain scale.   Treatments tried include:Tylenol, NSAIDs, otc ointments lidocaine and bengay, chondroitin or other alternative meds, heat, ice, rest, movement     Is the pain medication improving the patient’s symptoms: Never prescribed  Any adverse effects: Never prescribed  Alcohol or illicit drug use:   She  reports previous alcohol use.  She  reports no history of drug use.     History of controlled substance used in a way other than prescribed? No     Any early refills of a controlled substance: No  History of lost or stolen controlled substance prescription: No  Any aberrant behavior or intoxication while on a controlled substance: No  Has the patient self-modified their dose or frequency of the medication :No  Compliant with treatment recommendations and plan: Yes  Any major health change to the patient: No  Concerns for misuse, abuse or addiction: No  /NarxCheck report reviewed: Yes  History of abnormal drug screening: No  I have assessed the patient’s risk for abuse, dependency, and addiction using the validated Opioid Risk Tool.     Opioid Risk Score: 14       Interpretation of Opioid Risk Score   Score 0-3 = Low risk of abuse. Do UDS at least once per year.  Score 4-7 = Moderate risk of abuse. Do UDS 1-4 times per year.  Score 8+ = High risk of abuse. Refer to specialist.     I have conducted a  physical exam and documented findings.     I certify that I have obtained and reviewed her medical history. I have also made a good renee effort to obtain applicable records from other providers who have treated the patient.  I have reviewed the patient's prescription history as maintained by the [place] Prescription Monitoring Program.      Given the above, I believe the benefits of controlled substance therapy outweigh the risks. The reasons for prescribing controlled substances include non-narcotic, oral analgesic alternatives have been inadequate for pain control. Accordingly, I have discussed the risk and benefits, treatment plan, and alternative therapies with the patient. Patient was advised that this medicine is intended for short term (no more than 14 days)/intermittent use only and not intended to be an ongoing prescription. p  Assessment and Plan:     The following treatment plan was discussed:    1. Mild intermittent asthma without complication  Fluticasone Furoate (ARNUITY ELLIPTA) 100 MCG/ACT AEROSOL POWDER, BREATH ACTIVATED  -Chronic problem, unstable.  Patient never started Arnuity inhaler due to a 2-week illness and losing her inhaler.  We reviewed Arnuity use.  We also reviewed her symptom management.  ER precautions discussed.   2. Chronic right-sided low back pain without sciatica  lidocaine (LIDODERM) 5 % Patch    ibuprofen (MOTRIN) 800 MG Tab    tramadol (ULTRAM) 50 MG Tab    acetaminophen (TYLENOL) 500 MG Tab  -Chronic problem, worsening.  She has had a significant increase in her back pain leading her to not be able to work.  I will give her a 7-day course of tramadol to hopefully improve her pain until she can get in with physiatry.  Potential side effects and discontinuation criteria discussed with patient, patient verbalized understanding.  Discussed the importance of taking ibuprofen with food.  I have also provided the patient with written instructions regarding maximum dose of Tylenol in  a 24-hour period.       Any change or worsening of signs or symptoms, patient encouraged to follow-up or report to emergency room for further evaluation. Patient verbalizes understanding and agrees.    Follow-Up: Return in about 4 weeks (around 2/20/2020) for Asthma, toenail removal.      PLEASE NOTE: This dictation was created using voice recognition software. I have made every reasonable attempt to correct obvious errors, but I expect that there are errors of grammar and possibly content that I did not discover before finalizing the note.

## 2020-01-28 ENCOUNTER — TELEPHONE (OUTPATIENT)
Dept: MEDICAL GROUP | Facility: MEDICAL CENTER | Age: 35
End: 2020-01-28

## 2020-01-28 NOTE — TELEPHONE ENCOUNTER
FINAL PRIOR AUTHORIZATION STATUS:    1.  Name of Medication & Dose: Lidocaine 5% patches     2. Prior Auth Status: Approved through 01/28/2021     3. Action Taken: Pharmacy Notified: yes Patient Notified: yes

## 2020-01-28 NOTE — TELEPHONE ENCOUNTER
DOCUMENTATION OF PAR STATUS:    1. Name of Medication & Dose: Lidocaine 5% patches        2. Name of Prescription Coverage Company & phone #: [place] [employer]     3. Date Prior Auth Submitted: 01/28/2020    4. What information was given to obtain insurance decision? Diagnosis, previous meds etc.     5. Prior Auth Status? Pending    6. Patient Notified: yes

## 2020-01-29 ENCOUNTER — TELEPHONE (OUTPATIENT)
Dept: MEDICAL GROUP | Facility: MEDICAL CENTER | Age: 35
End: 2020-01-29

## 2020-01-30 NOTE — TELEPHONE ENCOUNTER
FINAL PRIOR AUTHORIZATION STATUS:    1.  Name of Medication & Dose:Lidocaine 5% patches    2. Prior Auth Status: Approved through 01/28/2020 to 01/27/2021     3. Action Taken: Pharmacy Notified: yes Patient Notified: yes

## 2020-02-07 ENCOUNTER — OFFICE VISIT (OUTPATIENT)
Dept: PHYSICAL MEDICINE AND REHAB | Facility: MEDICAL CENTER | Age: 35
End: 2020-02-07
Payer: MEDICAID

## 2020-02-07 VITALS
TEMPERATURE: 97.3 F | BODY MASS INDEX: 45.17 KG/M2 | OXYGEN SATURATION: 93 % | DIASTOLIC BLOOD PRESSURE: 82 MMHG | HEART RATE: 94 BPM | WEIGHT: 264.55 LBS | SYSTOLIC BLOOD PRESSURE: 124 MMHG | HEIGHT: 64 IN

## 2020-02-07 DIAGNOSIS — F19.10 POLYSUBSTANCE ABUSE (HCC): ICD-10-CM

## 2020-02-07 DIAGNOSIS — M54.16 RIGHT LUMBAR RADICULITIS: ICD-10-CM

## 2020-02-07 DIAGNOSIS — M43.17 SPONDYLOLISTHESIS OF LUMBOSACRAL REGION: ICD-10-CM

## 2020-02-07 PROCEDURE — 99205 OFFICE O/P NEW HI 60 MIN: CPT | Performed by: PHYSICAL MEDICINE & REHABILITATION

## 2020-02-07 RX ORDER — GABAPENTIN 300 MG/1
300 CAPSULE ORAL 3 TIMES DAILY
Qty: 90 CAP | Refills: 0 | Status: SHIPPED | OUTPATIENT
Start: 2020-02-07 | End: 2020-03-08

## 2020-02-07 ASSESSMENT — PATIENT HEALTH QUESTIONNAIRE - PHQ9
SUM OF ALL RESPONSES TO PHQ QUESTIONS 1-9: 4
5. POOR APPETITE OR OVEREATING: 0 - NOT AT ALL
CLINICAL INTERPRETATION OF PHQ2 SCORE: 2

## 2020-02-07 ASSESSMENT — PAIN SCALES - GENERAL: PAINLEVEL: 4=SLIGHT-MODERATE PAIN

## 2020-02-07 NOTE — PROGRESS NOTES
"New patient note    Physiatry (physical medicine and  Rehabilitation), interventional spine and sports medicine    Date of Service: 02/07/2020    Chief complaint:   Chief Complaint   Patient presents with   • New Patient     Lower back pain       HISTORY    HPI: Kaylie Burciaga 35 y.o. female who presents today for evaluation of low back pain.      She reports that she injured her back 3 years ago when she was helping to move stuff out of a house with her boyfriend.   In the past, she had PT, but it has been getting worse in the last month.    She states that she went to the urgent care about a month ago and was told that she has scoliosis.    There is pain in her low back that is 3-4/10 on the VAS.  It is a constant pain in her back.  It is aching pain, but when she walks for a while, she gets sharp pain in the low back.  She has to stop.    Treatments have included physical therapy.  Going to aquatic therapy did not seem to help, but she continues to do that.  She has been off for the last two months.    Her weight has fluctuated, she lost \"quite a bit of weight\" two years ago, but has regained it.    For the first year, she was taking ibuprofen.  She didn't think that it helped much.      Also, she has had \"two shots\" in her back.  This helped a little bit.  It was done at Urgent Care.     No trouble with bowel or bladder function.  No numbness or tingling in her legs    Medical records review:  I reviewed the note from the referring provider, PIERRE Lugo, from 12/16/2019 at the time of that visit she was seen at for chronic back pain.  She reported at that time that she is currently in a substance abuse program and her medication options are limited.  Ibuprofen Mobic and Flexeril are not working so she says she stopped them lidocaine cream was somewhat helpful she reported that her legs fall asleep quickly.  She was also seen for toenail fungus and intermittent asthma    She was also seen on " January 23 of 2020.  At that time she had been reporting that she had not been able to work for 2 weeks due to the pain she went to urgent care and they gave her steroids and a tramadol shot.  She was given steroids without significant improvement.  At that time she was also given a prescription for tramadol.  She was given a prescription for lidocaine patch and for Motrin 800 mg.  She was also advised to limit her Tylenol dose in a 24-hour period to 3000 mg.    Previous treatments:    Physical Therapy: Yes    Medications the patient is tried: NSAIDs    Previous interventions: none    Previous surgeries to relieve the above pain:  none      ROS:   Resp: asthma  GI: gallbladder removed  Psych: PTSD, anxiety, bipolar sees Helena Hess  Red Flags ROS:   Fever, Chills, Sweats: Denies  Involuntary Weight Loss: Denies  Bladder Incontinence: Denies  Bowel Incontinence: Denies  Saddle Anesthesia: Denies    All other systems reviewed and negative.       PMHx:   Past Medical History:   Diagnosis Date   • Anxiety and depression    • ASTHMA     since birth, used albuterol as needed   • bipolar    • Blood transfusion 2010    Gallbladder removed and had something punctured   • Depression     at age 11   • Hypothyroidism due to acquired atrophy of thyroid 5/31/2016   • Nausea & vomiting 1/15/2012       PSHx:   Past Surgical History:   Procedure Laterality Date   • STEFAN BY LAPAROSCOPY  12/1/08    Performed by BELLA ROQUE at SURGERY [facility]   • GASTROSCOPY WITH BIOPSY  11/29/08    Performed by GONZALES NAYAK at ENDOSCOPY [facility]   • OTHER  T & A 5 yrs ago   • OTHER ABDOMINAL SURGERY     • TONSILLECTOMY      Age at 17       Family history   Family History   Problem Relation Age of Onset   • Diabetes Mother         Insulin   • Hypertension Mother    • Alcohol/Drug Mother          Medications:   Current Outpatient Medications   Medication   • gabapentin (NEURONTIN) 300 MG Cap   • traZODone (DESYREL) 100  MG Tab   • Fluticasone Furoate (ARNUITY ELLIPTA) 100 MCG/ACT AEROSOL POWDER, BREATH ACTIVATED   • lidocaine (LIDODERM) 5 % Patch   • acetaminophen (TYLENOL) 500 MG Tab   • buPROPion (WELLBUTRIN XL) 150 MG XL tablet   • prazosin (MINIPRESS) 2 MG Cap   • amphetamine-dextroamphetamine (ADDERALL) 10 MG Tab   • traZODone (DESYREL) 50 MG Tab   • buPROPion (WELLBUTRIN XL) 300 MG XL tablet   • amphetamine-dextroamphetamine XR (ADDERALL XR) 10 MG CAPSULE SR 24 HR   • albuterol 108 (90 Base) MCG/ACT Aero Soln inhalation aerosol   • ibuprofen (MOTRIN) 800 MG Tab   • acyclovir (ZOVIRAX) 800 MG Tab   • naproxen (NAPROSYN) 500 MG Tab   • nitrofurantoin monohyd macro (MACROBID) 100 MG Cap   • terbinafine (LAMISIL) 250 MG Tab   • ergocalciferol (DRISDOL) 98322 UNIT capsule     No current facility-administered medications for this visit.        Allergies:   Allergies   Allergen Reactions   • Penicillins Anaphylaxis   • Morphine Rash     Rash only       Social Hx:   Social History     Socioeconomic History   • Marital status: Single     Spouse name: Not on file   • Number of children: Not on file   • Years of education: Not on file   • Highest education level: Not on file   Occupational History   • Not on file   Social Needs   • Financial resource strain: Not on file   • Food insecurity:     Worry: Not on file     Inability: Not on file   • Transportation needs:     Medical: Not on file     Non-medical: Not on file   Tobacco Use   • Smoking status: Current Every Day Smoker     Packs/day: 0.50     Years: 11.00     Pack years: 5.50     Types: Cigarettes   • Smokeless tobacco: Never Used   • Tobacco comment:  Patient has cut down to less than half a pack a day.   Substance and Sexual Activity   • Alcohol use: Not Currently   • Drug use: No   • Sexual activity: Yes     Partners: Male     Comment: none   Lifestyle   • Physical activity:     Days per week: Not on file     Minutes per session: Not on file   • Stress: Not on file  "  Relationships   • Social connections:     Talks on phone: Not on file     Gets together: Not on file     Attends Shinto service: Not on file     Active member of club or organization: Not on file     Attends meetings of clubs or organizations: Not on file     Relationship status: Not on file   • Intimate partner violence:     Fear of current or ex partner: Not on file     Emotionally abused: Not on file     Physically abused: Not on file     Forced sexual activity: Not on file   Other Topics Concern   •  Service No   • Blood Transfusions Yes   • Caffeine Concern No   • Occupational Exposure No   • Hobby Hazards No   • Sleep Concern Yes   • Stress Concern No   • Weight Concern Yes   • Special Diet No   • Back Care No   • Exercise No   • Bike Helmet No   • Seat Belt Yes   • Self-Exams No   Social History Narrative   • Not on file         EXAMINATION     Physical Exam:   Vitals: /82 (BP Location: Right arm, Patient Position: Sitting, BP Cuff Size: Large adult long)   Pulse 94   Temp 36.3 °C (97.3 °F) (Temporal)   Ht 1.626 m (5' 4\")   Wt 120 kg (264 lb 8.8 oz)   SpO2 93%     Constitutional:   Body Habitus: Body mass index is 45.41 kg/m².  Cooperation: Fully cooperates with exam  Appearance: Well-groomed, well-nourished, not disheveled, in no acute distress    Eyes: No scleral icterus, no proptosis     ENT -no obvious auditory deficits, no obvious tongue lesions, tongue midline, no facial droop     Skin -no rashes or lesions noted     Respiratory-  breathing comfortable on room air, no audible wheezing    Cardiovascular- capillary refills less than 2 seconds. No lower extremity edema is noted.     Gastrointestinal - no obvious abdominal masses, No tenderness to palpation in the abdomen    Psychiatric- alert and oriented ×3. Normal affect.     Gait - normal gait, no use of ambulatory device, nonantalgic. The patient can toe walk with ease. The patient has difficulty with heel walking on the right, " no trouble on the left    Musculoskeletal -   Cervical spine   Inspection: No deformities of the skin over the cervical spine. No rashes or lesions.    full  A/P ROM in all directions, without  pain      No signs of muscular atrophy in bilateral upper extremities     Thoracic/Lumbar Spine/Sacral Spine/Hips   Inspection: No evidence of atrophy in bilateral lower extremities throughout     ROM: decreased AROM with flexion, extension, lateral flexion, and rotation bilaterally, with pain     Palpation:   No tenderness to palpation in midline at T1-T12 levels. No tenderness to palpation in the left and right of the midline T1-L5  palpation over SI joint: positive bilaterally    palpation over buttock: positive bilaterally    palpation in hip or over the greater trochanters: negative bilaterally      Lumbar spine Special tests  Neuro tension  Straight leg test negative bilaterally      HIP  Range of motion in the hips is within normal limits in internal and external rotation bilaterally    SI joint tests  Observation patient sits on one buttocks: Negative  ZIGGY test negative bilaterally       Neuro       Key points for the international standards for neurological classification of spinal cord injury (ISNCSCI) to light touch.     Dermatome R L   C4 2  2   C5 2 2   C6 2 2   C7 2 2   C8 2 2   T1 2 2   T2 2 2   L2 2 2   L3 2 2   L4 2 2   L5 1 2   S1 2 2   S2 2 2           Motor Exam Upper Extremities   ? Myotome R L   Shoulder flexion C5 5 5   Elbow flexion C5 5 5   Wrist extension C6 5 5   Elbow extension C7 5 5   Finger flexion C8 5 5   Finger abduction T1 5 5         Motor Exam Lower Extremities    ? Myotome R L   Hip flexion L2 5 5   Knee extension L3 5 5   Ankle dorsiflexion L4 5 5   Toe extension L5 4 5   Ankle plantarflexion S1 5 5         Sameuls’s sign negative bilaterally   Babinski sign negative bilaterally   Clonus of the ankle negative bilaterally     Reflexes  ?  R L   Biceps  2+ 2+   Brachioradialis  2+ 2+    Patella  2+ 2+   Achilles   2+ 2+       MEDICAL DECISION MAKING    Medical records review: see under HPI section.     DATA    Labs:   Lab Results   Component Value Date/Time    SODIUM 137 01/08/2020 09:02 AM    POTASSIUM 4.0 01/08/2020 09:02 AM    CHLORIDE 105 01/08/2020 09:02 AM    CO2 25 01/08/2020 09:02 AM    ANION 7.0 01/08/2020 09:02 AM    GLUCOSE 104 (H) 01/08/2020 09:02 AM    BUN 13 01/08/2020 09:02 AM    CREATININE 0.88 01/08/2020 09:02 AM    CREATININE 0.9 03/08/2009 12:07 PM    CALCIUM 9.1 01/08/2020 09:02 AM    ASTSGOT 16 01/08/2020 09:02 AM    ALTSGPT 14 01/08/2020 09:02 AM    TBILIRUBIN 0.3 01/08/2020 09:02 AM    ALBUMIN 3.9 01/08/2020 09:02 AM    TOTPROTEIN 6.6 01/08/2020 09:02 AM    GLOBULIN 2.7 01/08/2020 09:02 AM    AGRATIO 1.4 01/08/2020 09:02 AM       Lab Results   Component Value Date/Time    PROTHROMBTM 11.4 03/05/2009 09:50 PM    INR 0.99 03/05/2009 09:50 PM        Lab Results   Component Value Date/Time    WBC 6.2 01/08/2020 09:02 AM    RBC 4.27 01/08/2020 09:02 AM    HEMOGLOBIN 11.4 (L) 01/08/2020 09:02 AM    HEMATOCRIT 38.1 01/08/2020 09:02 AM    MCV 89.2 01/08/2020 09:02 AM    MCH 26.7 (L) 01/08/2020 09:02 AM    MCHC 29.9 (L) 01/08/2020 09:02 AM    MPV 10.6 01/08/2020 09:02 AM    NEUTSPOLYS 72.20 (H) 08/26/2018 11:53 AM    LYMPHOCYTES 18.30 (L) 08/26/2018 11:53 AM    MONOCYTES 6.10 08/26/2018 11:53 AM    EOSINOPHILS 1.70 08/26/2018 11:53 AM    BASOPHILS 1.70 08/26/2018 11:53 AM    HYPOCHROMIA 1+ 04/22/2014 05:53 AM    ANISOCYTOSIS 1+ 08/26/2018 11:53 AM        Lab Results   Component Value Date/Time    HBA1C 5.5 01/08/2020 09:02 AM        Imaging: I personally reviewed following images, these are my reads  Xray lumbar spine 06/27/2016  There is note of retrolisthesis of L5-S1  Otherwise, normal series    IMAGING radiology reads. I reviewed the following radiology reads         Results for orders placed during the hospital encounter of 06/23/16   DX-LUMBAR SPINE-2 OR 3 VIEWS    Impression  2 mm retrolisthesis of L5 on S1.                                           Diagnosis   Visit Diagnoses     ICD-10-CM   1. Spondylolisthesis of lumbosacral region M43.17   2. Right lumbar radiculitis M54.16   3. Polysubstance abuse (HCC) F19.10           ASSESSMENT:  Kaylie Burciaga 35 y.o. female with low back pain and right lumbar radiculitis     Kaylie was seen today for new patient.    Diagnoses and all orders for this visit:    Spondylolisthesis of lumbosacral region  -     DX-LUMBAR SPINE-4+ VIEWS; Future  -     MR-LUMBAR SPINE-W/O; Future  -     REFERRAL TO PHYSICAL THERAPY Reason for Therapy: Eval/Treat/Report    Right lumbar radiculitis  -     MR-LUMBAR SPINE-W/O; Future  -     REFERRAL TO PHYSICAL THERAPY Reason for Therapy: Eval/Treat/Report  -     gabapentin (NEURONTIN) 300 MG Cap; Take 1 Cap by mouth 3 times a day for 30 days.    Polysubstance abuse (HCC)      1. Discussed trial of physical therapy, which was ordered.  Given the evidence of right lumbar radiculopathy/radiculitis, I have ordered new lumbar spine films and MRI.  2. Trial of gabapentin.  Instructed in how to titrate medication.  Discussed concern about side effects.        Follow-up: Return in about 2 weeks (around 2/21/2020).    Thank you very much for asking me to participate in Kaylie Burciaga's care.  Please contact me with any questions or concerns.    Please note that this dictation was created using voice recognition software. I have made every reasonable attempt to correct obvious errors but there may be errors of grammar and content that I may have overlooked prior to finalization of this note.      Jones Lambert MD  Physical Medicine and Rehabilitation  Interventional Spine and Sports Physiatry  [facility]

## 2020-02-18 ENCOUNTER — HOSPITAL ENCOUNTER (OUTPATIENT)
Dept: RADIOLOGY | Facility: MEDICAL CENTER | Age: 35
End: 2020-02-18
Attending: PHYSICAL MEDICINE & REHABILITATION
Payer: MEDICAID

## 2020-02-18 DIAGNOSIS — M43.17 SPONDYLOLISTHESIS OF LUMBOSACRAL REGION: ICD-10-CM

## 2020-02-18 PROCEDURE — 72110 X-RAY EXAM L-2 SPINE 4/>VWS: CPT

## 2020-02-19 ENCOUNTER — OFFICE VISIT (OUTPATIENT)
Dept: MEDICAL GROUP | Facility: MEDICAL CENTER | Age: 35
End: 2020-02-19
Attending: NURSE PRACTITIONER
Payer: MEDICAID

## 2020-02-19 VITALS
DIASTOLIC BLOOD PRESSURE: 62 MMHG | HEART RATE: 106 BPM | WEIGHT: 268.1 LBS | RESPIRATION RATE: 16 BRPM | HEIGHT: 64 IN | SYSTOLIC BLOOD PRESSURE: 108 MMHG | BODY MASS INDEX: 45.77 KG/M2 | TEMPERATURE: 98 F | OXYGEN SATURATION: 95 %

## 2020-02-19 DIAGNOSIS — B35.1 TOENAIL FUNGUS: ICD-10-CM

## 2020-02-19 DIAGNOSIS — E66.01 MORBID OBESITY WITH BMI OF 40.0-44.9, ADULT (HCC): ICD-10-CM

## 2020-02-19 PROCEDURE — 99214 OFFICE O/P EST MOD 30 MIN: CPT | Performed by: NURSE PRACTITIONER

## 2020-02-19 PROCEDURE — 11730 AVULSION NAIL PLATE SIMPLE 1: CPT | Mod: T5 | Performed by: NURSE PRACTITIONER

## 2020-02-19 PROCEDURE — 99213 OFFICE O/P EST LOW 20 MIN: CPT | Mod: 25 | Performed by: NURSE PRACTITIONER

## 2020-02-19 PROCEDURE — 99213 OFFICE O/P EST LOW 20 MIN: CPT | Performed by: NURSE PRACTITIONER

## 2020-02-19 ASSESSMENT — ENCOUNTER SYMPTOMS
DIARRHEA: 0
WEIGHT LOSS: 0
NAUSEA: 0
VOMITING: 0
SHORTNESS OF BREATH: 0
EYE DISCHARGE: 0
FEVER: 0
ABDOMINAL PAIN: 0
CHILLS: 0
WHEEZING: 0
DOUBLE VISION: 0
SINUS PAIN: 0
BLURRED VISION: 0
CONSTIPATION: 0
TREMORS: 0
DIZZINESS: 0
BLOOD IN STOOL: 0
COUGH: 0
TINGLING: 0
DEPRESSION: 0
INSOMNIA: 0
PALPITATIONS: 0

## 2020-02-19 NOTE — PROGRESS NOTES
Chief Complaint   Patient presents with   • Nail Problem       Subjective:     HPI:   Kayile Burciaga is a 35 y.o. female here to discuss the evaluation and management of:        Toenail fungus  Toenail fungus has become so profound that she is experiencing pain with ambulation.  She is unable to wear form shoes.  This is making it difficult for her to work.  She is requesting removal of the right great toenail today. Her right great toenail is thickened and making it difficult to wear shoes and work.        ROS  Review of Systems   Constitutional: Negative for chills, fever, malaise/fatigue and weight loss.   HENT: Negative for congestion, ear pain and sinus pain.    Eyes: Negative for blurred vision, double vision and discharge.   Respiratory: Negative for cough, shortness of breath and wheezing.    Cardiovascular: Negative for chest pain, palpitations and leg swelling.   Gastrointestinal: Negative for abdominal pain, blood in stool, constipation, diarrhea, melena, nausea and vomiting.   Genitourinary: Negative for dysuria, frequency and urgency.   Neurological: Negative for dizziness, tingling and tremors.   Endo/Heme/Allergies: Negative.    Psychiatric/Behavioral: Negative for depression. The patient does not have insomnia.          Allergies   Allergen Reactions   • Penicillins Anaphylaxis   • Morphine Rash     Rash only       Current medicines (including changes today)  Current Outpatient Medications   Medication Sig Dispense Refill   • gabapentin (NEURONTIN) 300 MG Cap Take 1 Cap by mouth 3 times a day for 30 days. 90 Cap 0   • traZODone (DESYREL) 100 MG Tab Take  mg by mouth every evening.     • Fluticasone Furoate (ARNUITY ELLIPTA) 100 MCG/ACT AEROSOL POWDER, BREATH ACTIVATED Inhale 1 Inhalation by mouth every day. 30 Each 11   • lidocaine (LIDODERM) 5 % Patch Apply 1 Patch to skin as directed every 24 hours. Wear patch for 12 hours then remove. 30 Patch 3   • ibuprofen (MOTRIN) 800 MG Tab Take  1 Tab by mouth every 8 hours as needed. Take with food. 90 Tab 5   • acetaminophen (TYLENOL) 500 MG Tab Take 2 Tabs by mouth 2 Times a Day. 60 Tab 5   • prazosin (MINIPRESS) 2 MG Cap      • terbinafine (LAMISIL) 250 MG Tab Take 1 Tab by mouth every day. 30 Tab 3   • ergocalciferol (DRISDOL) 43366 UNIT capsule Take 1 Cap by mouth every 7 days. 12 Cap 1   • buPROPion (WELLBUTRIN XL) 300 MG XL tablet Take 300 mg by mouth every morning.     • amphetamine-dextroamphetamine XR (ADDERALL XR) 10 MG CAPSULE SR 24 HR Take 10 mg by mouth every morning.     • albuterol 108 (90 Base) MCG/ACT Aero Soln inhalation aerosol Inhale 2 Puffs by mouth every 6 hours as needed for Shortness of Breath. 8.5 g 11   • acyclovir (ZOVIRAX) 800 MG Tab        No current facility-administered medications for this visit.        Social History     Tobacco Use   • Smoking status: Current Every Day Smoker     Packs/day: 0.50     Years: 11.00     Pack years: 5.50     Types: Cigarettes   • Smokeless tobacco: Never Used   • Tobacco comment:  Patient has cut down to less than half a pack a day.   Substance Use Topics   • Alcohol use: Not Currently   • Drug use: No       Patient Active Problem List    Diagnosis Date Noted   • Hypothyroidism due to acquired atrophy of thyroid 05/31/2016     Priority: Medium   • Tobacco abuse 04/11/2018     Priority: Low   • Encounter to establish care 12/16/2019   • Abdominal hernia 03/27/2018   • Chronic back pain 12/29/2017   • Leg swelling 11/09/2017   • Morbid obesity with BMI of 40.0-44.9, adult (HCC) 02/28/2017   • Inappropriately low urine specific gravity 01/30/2017   • Sore throat 10/31/2016   • Dysmenorrhea 06/16/2016   • Vitamin D deficiency 05/31/2016   • Right-sided low back pain without sciatica 05/12/2016   • Psychiatric disorder 04/14/2016   • Mild intermittent asthma 04/14/2016   • Toenail fungus 04/14/2016   • Polysubstance abuse ([facility]) 01/15/2012       Family History   Problem Relation Age of Onset   •  "Diabetes Mother         Insulin   • Hypertension Mother    • Alcohol/Drug Mother           Objective:     /62 (BP Location: Left arm, Patient Position: Sitting, BP Cuff Size: Adult long)   Pulse (!) 106   Temp 36.7 °C (98 °F) (Temporal)   Resp 16   Ht 1.626 m (5' 4\")   Wt 121.6 kg (268 lb 1.6 oz)   SpO2 95%  Body mass index is 46.02 kg/m².    Physical Exam:  Physical Exam   Constitutional: She is oriented to person, place, and time and well-developed, well-nourished, and in no distress. No distress.   HENT:   Head: Normocephalic.   Right Ear: Tympanic membrane and external ear normal.   Left Ear: Tympanic membrane and external ear normal.   Eyes: Pupils are equal, round, and reactive to light. Conjunctivae and EOM are normal.   Neck: Normal range of motion. Neck supple. No tracheal deviation present.   Cardiovascular: Normal rate, regular rhythm, normal heart sounds and intact distal pulses.   Pulmonary/Chest: Effort normal and breath sounds normal.   Abdominal: Soft. Bowel sounds are normal.   Musculoskeletal: Normal range of motion.   Lymphadenopathy:        Head (right side): No preauricular adenopathy present.        Head (left side): No preauricular adenopathy present.     She has no cervical adenopathy.   Neurological: She is alert and oriented to person, place, and time. She has normal sensation, normal strength and intact cranial nerves. Gait normal.   Skin: Skin is warm and dry.   Psychiatric: Affect and judgment normal.          Procedure note:    Risk and benefit of total right great toenail removal procedure was discussed with patient and they agreed to proceed.  Patient signed consent for procedure.  Skin was initially prepped with alcohol.  Approximately 4 cc of 1% Xylocaine without epi were injected along the medial aspect of the great toe and about 2 cc were injected along the lateral aspect to achieve a digital block.  Once anesthesia was achieved, a tourniquet was applied to the base of " the toe.  The toe was then prepped with Betadine.  High risk scissors were used to dissect the nail away from the nail bed.   The nail was grasped with forceps, rotated medially, and the nail was removed. There was little to no bleeding during the procedure.  Antibiotic ointment was then applied and the wound was covered in gauze.  Coban was wrapped around the toe as a mild pressure dressing.  Patient tolerated the procedure well without complication.      Assessment and Plan:     The following treatment plan was discussed:     1. Toenail fungus  Consent for Non-Surgery w/o Sedation     - Chronic problem.  Pt tolerated procedure well. Post care instructions and supplies given to pt.     2.  Obesity BMI 40-49  chronic problem, unstable.  Patient is requesting a referral to bariatric surgery.  Referral placed.  Referral to bariatric surgery         Any change or worsening of signs or symptoms, patient encouraged to follow-up or report to emergency room for further evaluation. Patient verbalizes understanding and agrees.    Follow-Up: Return if symptoms worsen or fail to improve.      PLEASE NOTE: This dictation was created using voice recognition software. I have made every reasonable attempt to correct obvious errors, but I expect that there are errors of grammar and possibly content that I did not discover before finalizing the note.

## 2020-02-19 NOTE — LETTER
February 19, 2020       Patient: Kaylie Burciaga   YOB: 1985   Date of Visit: 2/19/2020         To Whom It May Concern:     I have explored Kaylie's creatinine, she does not have elevated levels on my tests.  I have encouraged increased water intake.  I do not have concerns regarding her creatinine levels or kidney function currently.      If you have any questions or concerns, please don't hesitate to call [phone]          Sincerely,          JESSICA Marie.P.R.N.  Electronically Signed

## 2020-02-19 NOTE — ASSESSMENT & PLAN NOTE
Toenail fungus has become so profound that she is experiencing pain with ambulation.  She is unable to wear form shoes.  This is making it difficult for her to work.  She is requesting removal of the right great toenail today. Her right great toenail is thickened and making it difficult to wear shoes and work.

## 2020-02-21 ENCOUNTER — APPOINTMENT (OUTPATIENT)
Dept: PHYSICAL MEDICINE AND REHAB | Facility: MEDICAL CENTER | Age: 35
End: 2020-02-21
Payer: MEDICAID

## 2020-02-28 ENCOUNTER — APPOINTMENT (OUTPATIENT)
Dept: RADIOLOGY | Facility: MEDICAL CENTER | Age: 35
End: 2020-02-28
Attending: PHYSICAL MEDICINE & REHABILITATION
Payer: MEDICAID

## 2020-02-28 DIAGNOSIS — M54.16 RIGHT LUMBAR RADICULITIS: ICD-10-CM

## 2020-02-28 DIAGNOSIS — M43.17 SPONDYLOLISTHESIS OF LUMBOSACRAL REGION: ICD-10-CM

## 2020-02-28 PROCEDURE — 72148 MRI LUMBAR SPINE W/O DYE: CPT

## 2020-03-17 ENCOUNTER — OFFICE VISIT (OUTPATIENT)
Dept: PHYSICAL MEDICINE AND REHAB | Facility: MEDICAL CENTER | Age: 35
End: 2020-03-17
Payer: MEDICAID

## 2020-03-17 VITALS
DIASTOLIC BLOOD PRESSURE: 60 MMHG | SYSTOLIC BLOOD PRESSURE: 124 MMHG | HEART RATE: 85 BPM | OXYGEN SATURATION: 93 % | WEIGHT: 263.23 LBS | TEMPERATURE: 98.1 F | BODY MASS INDEX: 44.94 KG/M2 | HEIGHT: 64 IN

## 2020-03-17 DIAGNOSIS — E66.01 CLASS 3 SEVERE OBESITY DUE TO EXCESS CALORIES WITH BODY MASS INDEX (BMI) OF 45.0 TO 49.9 IN ADULT, UNSPECIFIED WHETHER SERIOUS COMORBIDITY PRESENT (HCC): ICD-10-CM

## 2020-03-17 DIAGNOSIS — M43.17 SPONDYLOLISTHESIS OF LUMBOSACRAL REGION: ICD-10-CM

## 2020-03-17 DIAGNOSIS — M54.50 LUMBOSACRAL PAIN: ICD-10-CM

## 2020-03-17 DIAGNOSIS — E55.9 VITAMIN D DEFICIENCY: ICD-10-CM

## 2020-03-17 PROCEDURE — 99214 OFFICE O/P EST MOD 30 MIN: CPT | Performed by: PHYSICAL MEDICINE & REHABILITATION

## 2020-03-17 RX ORDER — GABAPENTIN 300 MG/1
300 CAPSULE ORAL 3 TIMES DAILY
Qty: 90 CAP | Refills: 2 | Status: SHIPPED | OUTPATIENT
Start: 2020-03-17 | End: 2020-04-16

## 2020-03-17 ASSESSMENT — PATIENT HEALTH QUESTIONNAIRE - PHQ9
SUM OF ALL RESPONSES TO PHQ QUESTIONS 1-9: 7
5. POOR APPETITE OR OVEREATING: 1 - SEVERAL DAYS
CLINICAL INTERPRETATION OF PHQ2 SCORE: 2

## 2020-03-17 ASSESSMENT — FIBROSIS 4 INDEX: FIB4 SCORE: 0.49

## 2020-03-17 ASSESSMENT — PAIN SCALES - GENERAL: PAINLEVEL: 2=MINIMAL-SLIGHT

## 2020-03-17 NOTE — PROGRESS NOTES
"Follow-up patient note    Physiatry (physical medicine and  Rehabilitation), interventional spine and sports medicine    Date of Service:03/17/2020    Chief complaint:   Chief Complaint   Patient presents with   • Follow-Up     Lower back pain       HISTORY    HPI: Kaylie Burciaga 35 y.o. female who presents today for evaluation of low back pain.      She reports that she injured her back 3 years ago when she was helping to move stuff out of a house with her boyfriend.   In the past, she had PT, but it has been getting worse in the last month.     Since the last visit, she reports that she has been getting better with PT.  She now reports that her pain is a 2/10 on the VAS.  The pain continues to be axial in her low back.    Her weight has fluctuated, she lost \"quite a bit of weight\" two years ago, but has regained it.  Her goal is to have weight loss surgery, she thinks that this will also help with her pain.  Gabapentin seems to be well-tolerated and helping with pain.     No trouble with bowel or bladder function.  No numbness or tingling in her legs    Medical records review:  I reviewed the note from the referring provider, PIERRE Lugo, from 12/16/2019 at the time of that visit she was seen at for chronic back pain.  She reported at that time that she is currently in a substance abuse program and her medication options are limited.  Ibuprofen Mobic and Flexeril are not working so she says she stopped them lidocaine cream was somewhat helpful she reported that her legs fall asleep quickly.  She was also seen for toenail fungus and intermittent asthma    She was also seen on January 23 of 2020.  At that time she had been reporting that she had not been able to work for 2 weeks due to the pain she went to urgent care and they gave her steroids and a tramadol shot.  She was given steroids without significant improvement.  At that time she was also given a prescription for tramadol.  She was given a " prescription for lidocaine patch and for Motrin 800 mg.  She was also advised to limit her Tylenol dose in a 24-hour period to 3000 mg.    Previous treatments:    Physical Therapy: Yes    Medications the patient is tried: NSAIDs    Previous interventions: none    Previous surgeries to relieve the above pain:  none      ROS: Unchanged from 02/07/2020  Resp: asthma  GI: gallbladder removed  Psych: PTSD, anxiety, bipolar sees Helena Hess  Red Flags ROS:   Fever, Chills, Sweats: Denies  Involuntary Weight Loss: Denies  Bladder Incontinence: Denies  Bowel Incontinence: Denies  Saddle Anesthesia: Denies    All other systems reviewed and negative.       PMHx:   Past Medical History:   Diagnosis Date   • Anxiety and depression    • ASTHMA     since birth, used albuterol as needed   • bipolar    • Blood transfusion 2010    Gallbladder removed and had something punctured   • Depression     at age 11   • Hypothyroidism due to acquired atrophy of thyroid 5/31/2016   • Nausea & vomiting 1/15/2012       PSHx:   Past Surgical History:   Procedure Laterality Date   • STEFAN BY LAPAROSCOPY  12/1/08    Performed by BELLA ROQUE at SUR[facility]ERY [facility]   • GASTROSCOPY WITH BIOPSY  11/29/08    Performed by GONZALES NAYAK at ENDOSCOPY [facility]   • OTHER  T & A 5 yrs ago   • OTHER ABDOMINAL SURGERY     • TONSILLECTOMY      Age at 17       Family history   Family History   Problem Relation Age of Onset   • Diabetes Mother         Insulin   • Hypertension Mother    • Alcohol/Drug Mother          Medications:   Current Outpatient Medications   Medication   • gabapentin (NEURONTIN) 300 MG Cap   • traZODone (DESYREL) 100 MG Tab   • lidocaine (LIDODERM) 5 % Patch   • ibuprofen (MOTRIN) 800 MG Tab   • acetaminophen (TYLENOL) 500 MG Tab   • acyclovir (ZOVIRAX) 800 MG Tab   • prazosin (MINIPRESS) 2 MG Cap   • terbinafine (LAMISIL) 250 MG Tab   • ergocalciferol (DRISDOL) 21301 UNIT capsule   • buPROPion (WELLBUTRIN XL) 300  MG XL tablet   • amphetamine-dextroamphetamine XR (ADDERALL XR) 10 MG CAPSULE SR 24 HR   • albuterol 108 (90 Base) MCG/ACT Aero Soln inhalation aerosol   • Fluticasone Furoate (ARNUITY ELLIPTA) 100 MCG/ACT AEROSOL POWDER, BREATH ACTIVATED     No current facility-administered medications for this visit.        Allergies:   Allergies   Allergen Reactions   • Penicillins Anaphylaxis   • Morphine Rash     Rash only       Social Hx:   Social History     Socioeconomic History   • Marital status: Single     Spouse name: Not on file   • Number of children: Not on file   • Years of education: Not on file   • Highest education level: Not on file   Occupational History   • Not on file   Social Needs   • Financial resource strain: Not on file   • Food insecurity     Worry: Not on file     Inability: Not on file   • Transportation needs     Medical: Not on file     Non-medical: Not on file   Tobacco Use   • Smoking status: Current Every Day Smoker     Packs/day: 0.50     Years: 11.00     Pack years: 5.50     Types: Cigarettes   • Smokeless tobacco: Never Used   • Tobacco comment:  Patient has cut down to less than half a pack a day.   Substance and Sexual Activity   • Alcohol use: Not Currently   • Drug use: No   • Sexual activity: Yes     Partners: Male     Comment: none   Lifestyle   • Physical activity     Days per week: Not on file     Minutes per session: Not on file   • Stress: Not on file   Relationships   • Social connections     Talks on phone: Not on file     Gets together: Not on file     Attends Mu-ism service: Not on file     Active member of club or organization: Not on file     Attends meetings of clubs or organizations: Not on file     Relationship status: Not on file   • Intimate partner violence     Fear of current or ex partner: Not on file     Emotionally abused: Not on file     Physically abused: Not on file     Forced sexual activity: Not on file   Other Topics Concern   •  Service No   • Blood  "Transfusions Yes   • Caffeine Concern No   • Occupational Exposure No   • Hobby Hazards No   • Sleep Concern Yes   • Stress Concern No   • Weight Concern Yes   • Special Diet No   • Back Care No   • Exercise No   • Bike Helmet No   • Seat Belt Yes   • Self-Exams No   Social History Narrative   • Not on file         EXAMINATION     Physical Exam:   Vitals: /60 (BP Location: Left arm, Patient Position: Sitting, BP Cuff Size: Large adult long)   Pulse 85   Temp 36.7 °C (98.1 °F) (Temporal)   Ht 1.626 m (5' 4\")   Wt 119.4 kg (263 lb 3.7 oz)   SpO2 93%     Constitutional:   Body Habitus: Body mass index is 45.18 kg/m².  Cooperation: Fully cooperates with exam  Appearance: Well-groomed, well-nourished, not disheveled, in no acute distress    Eyes: No scleral icterus, no proptosis     ENT -no obvious auditory deficits, no obvious tongue lesions, tongue midline, no facial droop     Skin -no rashes or lesions noted     Respiratory-  breathing comfortable on room air, no audible wheezing    Cardiovascular- No lower extremity edema is noted.     Psychiatric- alert and oriented ×3. Normal affect.     Gait - normal gait, no use of ambulatory device, nonantalgic.    Musculoskeletal -   Cervical spine   Inspection: No deformities of the skin over the cervical spine. No rashes or lesions.    full  A/P ROM in all directions, without  pain      No signs of muscular atrophy in bilateral upper extremities     Thoracic/Lumbar Spine/Sacral Spine/Hips   Inspection: No evidence of atrophy in bilateral lower extremities throughout     Palpation:   No tenderness to palpation in midline at T1-T12 levels. No tenderness to palpation in the left and right of the midline T1-L5  palpation over SI joint: positive bilaterally    palpation over buttock: positive bilaterally    palpation in hip or over the greater trochanters: negative bilaterally      Lumbar spine Special tests  Neuro tension  Seated straight leg test negative bilaterally  "     Neuro       Key points for the international standards for neurological classification of spinal cord injury (ISNCSCI) to light touch.     Dermatome R L   L2 2 2   L3 2 2   L4 2 2   L5 1 2   S1 2 2   S2 2 2       Motor Exam Lower Extremities    ? Myotome R L   Hip flexion L2 5 5   Knee extension L3 5 5   Ankle dorsiflexion L4 5 5   Toe extension L5 4+ 5   Ankle plantarflexion S1 5 5       Clonus of the ankle negative bilaterally     Reflexes  ?  R L   Patella  2+ 2+   Achilles   2+ 2+       MEDICAL DECISION MAKING    Medical records review: see under HPI section.     DATA    Labs:   Lab Results   Component Value Date/Time    SODIUM 137 01/08/2020 09:02 AM    POTASSIUM 4.0 01/08/2020 09:02 AM    CHLORIDE 105 01/08/2020 09:02 AM    CO2 25 01/08/2020 09:02 AM    ANION 7.0 01/08/2020 09:02 AM    GLUCOSE 104 (H) 01/08/2020 09:02 AM    BUN 13 01/08/2020 09:02 AM    CREATININE 0.88 01/08/2020 09:02 AM    CREATININE 0.9 03/08/2009 12:07 PM    CALCIUM 9.1 01/08/2020 09:02 AM    ASTSGOT 16 01/08/2020 09:02 AM    ALTSGPT 14 01/08/2020 09:02 AM    TBILIRUBIN 0.3 01/08/2020 09:02 AM    ALBUMIN 3.9 01/08/2020 09:02 AM    TOTPROTEIN 6.6 01/08/2020 09:02 AM    GLOBULIN 2.7 01/08/2020 09:02 AM    AGRATIO 1.4 01/08/2020 09:02 AM       Lab Results   Component Value Date/Time    PROTHROMBTM 11.4 03/05/2009 09:50 PM    INR 0.99 03/05/2009 09:50 PM        Lab Results   Component Value Date/Time    WBC 6.2 01/08/2020 09:02 AM    RBC 4.27 01/08/2020 09:02 AM    HEMOGLOBIN 11.4 (L) 01/08/2020 09:02 AM    HEMATOCRIT 38.1 01/08/2020 09:02 AM    MCV 89.2 01/08/2020 09:02 AM    MCH 26.7 (L) 01/08/2020 09:02 AM    MCHC 29.9 (L) 01/08/2020 09:02 AM    MPV 10.6 01/08/2020 09:02 AM    NEUTSPOLYS 72.20 (H) 08/26/2018 11:53 AM    LYMPHOCYTES 18.30 (L) 08/26/2018 11:53 AM    MONOCYTES 6.10 08/26/2018 11:53 AM    EOSINOPHILS 1.70 08/26/2018 11:53 AM    BASOPHILS 1.70 08/26/2018 11:53 AM    HYPOCHROMIA 1+ 04/22/2014 05:53 AM    ANISOCYTOSIS 1+  08/26/2018 11:53 AM        Lab Results   Component Value Date/Time    HBA1C 5.5 01/08/2020 09:02 AM        Imaging: I personally reviewed following images, these are my reads  Xray lumbar spine 06/27/2016  There is note of retrolisthesis of L5-S1  Otherwise, normal series    IMAGING radiology reads. I reviewed the following radiology reads         Results for orders placed during the hospital encounter of 06/23/16   DX-LUMBAR SPINE-2 OR 3 VIEWS    Impression 2 mm retrolisthesis of L5 on S1.                                           Diagnosis   Visit Diagnoses     ICD-10-CM   1. Spondylolisthesis of lumbosacral region M43.17   2. Lumbosacral pain M54.5   3. Class 3 severe obesity due to excess calories with body mass index (BMI) of 45.0 to 49.9 in adult, unspecified whether serious comorbidity present (HCC) E66.01    Z68.42   4. Vitamin D deficiency E55.9           ASSESSMENT:  Kaylie Andersen Gualberto 35 y.o. female with low back pain and right lumbar radiculitis     Kaylie was seen today for follow-up.    Diagnoses and all orders for this visit:    Spondylolisthesis of lumbosacral region  -     gabapentin (NEURONTIN) 300 MG Cap; Take 1 Cap by mouth 3 times a day for 30 days.    Lumbosacral pain    Class 3 severe obesity due to excess calories with body mass index (BMI) of 45.0 to 49.9 in adult, unspecified whether serious comorbidity present (HCC)    Vitamin D deficiency      1. Discussed continuing with gabapentin 300mg po tid.  Refilled today.  2. Advised that she continue with physical therapy and transition to home.  If this does not continue to improve, will consider injections in the future.  3. Encouraged her to continue with vitamin D supplementation.  4. Discussed plans to follow-up with weight loss surgery consultation and dietary changes that she can make, if this must be delayed.      Follow-up: Return in about 2 months (around 5/17/2020).    Thank you very much for asking me to participate in Kaylie  Nathaly Burciaga's care.  Please contact me with any questions or concerns.    Please note that this dictation was created using voice recognition software. I have made every reasonable attempt to correct obvious errors but there may be errors of grammar and content that I may have overlooked prior to finalization of this note.      Jones Lambert MD  Physical Medicine and Rehabilitation  Interventional Spine and Sports Physiatry  [facility]

## 2020-05-19 ENCOUNTER — APPOINTMENT (OUTPATIENT)
Dept: PHYSICAL MEDICINE AND REHAB | Facility: MEDICAL CENTER | Age: 35
End: 2020-05-19
Payer: MEDICAID

## 2020-05-22 ENCOUNTER — OFFICE VISIT (OUTPATIENT)
Dept: PHYSICAL MEDICINE AND REHAB | Facility: MEDICAL CENTER | Age: 35
End: 2020-05-22
Payer: MEDICAID

## 2020-05-22 VITALS
OXYGEN SATURATION: 95 % | HEIGHT: 64 IN | WEIGHT: 268.08 LBS | DIASTOLIC BLOOD PRESSURE: 60 MMHG | SYSTOLIC BLOOD PRESSURE: 122 MMHG | TEMPERATURE: 98.2 F | HEART RATE: 97 BPM | BODY MASS INDEX: 45.77 KG/M2

## 2020-05-22 DIAGNOSIS — M54.50 LUMBOSACRAL PAIN: ICD-10-CM

## 2020-05-22 DIAGNOSIS — M47.816 LUMBAR SPONDYLOSIS: ICD-10-CM

## 2020-05-22 DIAGNOSIS — M43.17 SPONDYLOLISTHESIS OF LUMBOSACRAL REGION: ICD-10-CM

## 2020-05-22 PROCEDURE — 99214 OFFICE O/P EST MOD 30 MIN: CPT | Performed by: PHYSICAL MEDICINE & REHABILITATION

## 2020-05-22 RX ORDER — BUPROPION HYDROCHLORIDE 150 MG/1
TABLET ORAL
COMMUNITY
Start: 2020-05-19 | End: 2020-08-24

## 2020-05-22 RX ORDER — GABAPENTIN 300 MG/1
300 CAPSULE ORAL 3 TIMES DAILY
COMMUNITY
Start: 2020-05-19

## 2020-05-22 ASSESSMENT — PATIENT HEALTH QUESTIONNAIRE - PHQ9
SUM OF ALL RESPONSES TO PHQ QUESTIONS 1-9: 4
5. POOR APPETITE OR OVEREATING: 0 - NOT AT ALL
CLINICAL INTERPRETATION OF PHQ2 SCORE: 1

## 2020-05-22 ASSESSMENT — FIBROSIS 4 INDEX: FIB4 SCORE: 0.49

## 2020-05-22 ASSESSMENT — PAIN SCALES - GENERAL: PAINLEVEL: 5=MODERATE PAIN

## 2020-05-22 NOTE — PATIENT INSTRUCTIONS
Your procedure will be at the [facility] special procedure suite.    [address] [place] [address]       PRE-PROCEDURE INSTRUCTIONS  You may take your regular medications except:   · No Anti-inflammatories 5 days prior to your procedure. Anti-inflammatories include medicines such as  ibuprofen (Motrin, Advil), Excedrin, Naproxen (Aleve, Anaprox, Naprelan, Naprosyn), Celecoxib (Celebrex), Diclofenac (Voltaren-XR tab), and Meloxicam (Mobic).   · No Glucophage or Metformin 24 hours before your procedure. You may resume next day after your procedure.  · Call the physiatry office if you are taking or prescribed anti-biotics within five days of procedure.  · Please ask provider if you are taking any new diabetes medication.  · CONTINUE TAKING BLOOD PRESSURE MEDICATIONS AS PRESCRIBED.  · Pain medications will not be prescribed on the procedure day. Procedural pain medication may be used by your provider   · Call your doctor's office performing the procedure if you have a fever, chills, rash or new illness prior to your procedure    Anticoagulation/antiplatelet medications  No Blood thinning medications such as Coumadin or Plavix 5 days prior to procedure unless your doctor said to continue these medications. Call your doctor if a new medication is prescribed in this class.     Restrictions for eating before procedure:   · If you are getting procedural sedation, then do not eat to for 8 hours prior to procedure appointment time. Do not drink fluids for four hours prior to your procedure time.   · If you are not having procedural sedation, then Skip the meal prior to your procedure. If you have a morning procedure then skip breakfast. If you have an afternoon procedure then skip lunch.   · You may drink clear liquids up to 2 hours prior to your procedure  · You must have a  the day of procedure to accompany you home.      POST PROCEDURE INSTRUCTIONS   · No heavy lifting, strenuous bending or  strenuous exercise for 3 days after your procedure.  · No hot tubs, baths, swimming for 3 days after your procedure  · You can remove the bandage the day after the procedure.  · IF YOU RECEIVED A STEROID INJECTION. PLEASE NOTE THAT THERE MAY BE A DELAY FOR THE INJECTION TO START WORKING, THE DELAY MAY BE UP TO TWO WEEKS. IF YOU HAVE DIABETES, PLEASE NOTE THAT YOUR SUGAR LEVELS MAY BE ELEVATED FOR 1-2 DAYS AFTER A STEROID INJECTION.  THE STEROID MAY CAUSE TEMPORARY SYMPTOMS WHICH USUALLY RESOLVE ON THEIR OWN WITHIN 1 TO 2 DAYS INCLUDING FACIAL FLUSHING OR A FEELING OF WARMTH ON THE FACE, TEMPORARY INCREASES IN BLOOD SUGAR, INSOMNIA, INCREASED HUNGER  · IF YOU RECEIVED A DIAGNOSTIC PROCEDURE (SUCH AS A MEDIAL BRANCH BLOCK), PLEASE NOTE THAT WE DO EXPECT THIS INJECTION TO WEAR OFF.  IT IS IMPORTANT TO COMPLETE THE PAIN DIARY AND LIST THE PAIN SCORE ONLY FOR THE REGION WHERE THE PROCEDURE WAS AND BRING THIS TO YOUR FOLLOW UP VISIT.  · IF YOU RECEIVED A RADIOFREQUENCY PROCEDURE, THERE MAY BE SOME SORENESS AFTER THE PROCEDURE.  THIS IS NORMAL.  · IF YOU EXPERIENCE PROLONGED WEAKNESS LONGER THAN ONE DAY, BOWEL OR BLADDER INCONTINENCE THEN PLEASE CALL THE PHYSIATRY OFFICE.  · Your leg may feel heavy, weak and numb for up to 1-2 days. Be very careful walking.   ·  You may resume normal activities 3 days after procedure.

## 2020-05-22 NOTE — PROGRESS NOTES
Follow-up patient note    Physiatry (physical medicine and  Rehabilitation), interventional spine and sports medicine    Date of Service:05/22/2020    Chief complaint:   Chief Complaint   Patient presents with   • Follow-Up     Back Pain       HISTORY    HPI: Kaylie Burciaga 35 y.o. female who presents today for evaluation of low back pain.      Since the last visit on 03/17/2020, Kaylie reports that she has been doing her exercise program every day.  This has been somewhat helpful.  Previously, she had been having improvement with PT and does think her exercises help.  She was not able to go to PT due to the COVID-19 pandemic.      Her pain is axial.  No parethesias in the legs.  No weakness, bowel or bladder dysfunction.    For her pain, she takes gabapentin 300mg three times a day.  She reports no side effects.      Medical records review:  I reviewed the note from the referring provider, PIERRE Lugo, from 12/16/2019 at the time of that visit she was seen at for chronic back pain.  She reported at that time that she is currently in a substance abuse program and her medication options are limited.  Ibuprofen Mobic and Flexeril are not working so she says she stopped them lidocaine cream was somewhat helpful she reported that her legs fall asleep quickly.  She was also seen for toenail fungus and intermittent asthma    She was also seen on January 23 of 2020.  At that time she had been reporting that she had not been able to work for 2 weeks due to the pain she went to urgent care and they gave her steroids and a tramadol shot.  She was given steroids without significant improvement.  At that time she was also given a prescription for tramadol.  She was given a prescription for lidocaine patch and for Motrin 800 mg.  She was also advised to limit her Tylenol dose in a 24-hour period to 3000 mg.    Previous treatments:    Physical Therapy: Yes    Medications the patient is tried: NSAIDs    Previous  interventions: none    Previous surgeries to relieve the above pain:  none      ROS: Unchanged from 03/17/2020 except as in the HPI  Resp: asthma  GI: gallbladder removed  Psych: PTSD, anxiety, bipolar sees Helena Hess  Red Flags ROS:   Fever, Chills, Sweats: Denies  Involuntary Weight Loss: Denies  Bladder Incontinence: Denies  Bowel Incontinence: Denies  Saddle Anesthesia: Denies    All other systems reviewed and negative.       PMHx:   Past Medical History:   Diagnosis Date   • Anxiety and depression    • ASTHMA     since birth, used albuterol as needed   • bipolar    • Blood transfusion 2010    Gallbladder removed and had something punctured   • Depression     at age 11   • Hypothyroidism due to acquired atrophy of thyroid 5/31/2016   • Nausea & vomiting 1/15/2012       PSHx:   Past Surgical History:   Procedure Laterality Date   • STEFAN BY LAPAROSCOPY  12/1/08    Performed by BELLA ROQUE at SURGERY [facility] [place]   • GASTROSCOPY WITH BIOPSY  11/29/08    Performed by GONZALES NAYAK at ENDOSCOPY [facility]   • OTHER  T & A 5 yrs ago   • OTHER ABDOMINAL SURGERY     • TONSILLECTOMY      Age at 17       Family history   Family History   Problem Relation Age of Onset   • Diabetes Mother         Insulin   • Hypertension Mother    • Alcohol/Drug Mother          Medications:   Current Outpatient Medications   Medication   • gabapentin (NEURONTIN) 300 MG Cap   • traZODone (DESYREL) 100 MG Tab   • lidocaine (LIDODERM) 5 % Patch   • ibuprofen (MOTRIN) 800 MG Tab   • acetaminophen (TYLENOL) 500 MG Tab   • prazosin (MINIPRESS) 2 MG Cap   • buPROPion (WELLBUTRIN XL) 300 MG XL tablet   • amphetamine-dextroamphetamine XR (ADDERALL XR) 10 MG CAPSULE SR 24 HR   • albuterol 108 (90 Base) MCG/ACT Aero Soln inhalation aerosol   • buPROPion (WELLBUTRIN XL) 150 MG XL tablet   • Fluticasone Furoate (ARNUITY ELLIPTA) 100 MCG/ACT AEROSOL POWDER, BREATH ACTIVATED   • acyclovir (ZOVIRAX) 800 MG Tab   • terbinafine  (LAMISIL) 250 MG Tab   • ergocalciferol (DRISDOL) 58997 UNIT capsule     No current facility-administered medications for this visit.        Allergies:   Allergies   Allergen Reactions   • Penicillins Anaphylaxis   • Morphine Rash     Rash only       Social Hx:   Social History     Socioeconomic History   • Marital status: Single     Spouse name: Not on file   • Number of children: Not on file   • Years of education: Not on file   • Highest education level: Not on file   Occupational History   • Not on file   Social Needs   • Financial resource strain: Not on file   • Food insecurity     Worry: Not on file     Inability: Not on file   • Transportation needs     Medical: Not on file     Non-medical: Not on file   Tobacco Use   • Smoking status: Current Every Day Smoker     Packs/day: 0.50     Years: 11.00     Pack years: 5.50     Types: Cigarettes   • Smokeless tobacco: Never Used   • Tobacco comment:  Patient has cut down to less than half a pack a day.   Substance and Sexual Activity   • Alcohol use: Not Currently   • Drug use: No   • Sexual activity: Yes     Partners: Male     Comment: none   Lifestyle   • Physical activity     Days per week: Not on file     Minutes per session: Not on file   • Stress: Not on file   Relationships   • Social connections     Talks on phone: Not on file     Gets together: Not on file     Attends [employer] service: Not on file     Active member of club or organization: Not on file     Attends meetings of clubs or organizations: Not on file     Relationship status: Not on file   • Intimate partner violence     Fear of current or ex partner: Not on file     Emotionally abused: Not on file     Physically abused: Not on file     Forced sexual activity: Not on file   Other Topics Concern   •  Service No   • Blood Transfusions Yes   • Caffeine Concern No   • Occupational Exposure No   • Hobby Hazards No   • Sleep Concern Yes   • Stress Concern No   • Weight Concern Yes   • Special  "Diet No   • Back Care No   • Exercise No   • Bike Helmet No   • Seat Belt Yes   • Self-Exams No   Social History Narrative   • Not on file         EXAMINATION     Physical Exam:   Vitals: /60 (BP Location: Left arm, Patient Position: Sitting, BP Cuff Size: Adult long)   Pulse 97   Temp 36.8 °C (98.2 °F) (Temporal)   Ht 1.626 m (5' 4\")   Wt 121.6 kg (268 lb 1.3 oz)   SpO2 95%     Constitutional:   Body Habitus: Body mass index is 46.02 kg/m².  Cooperation: Fully cooperates with exam  Appearance: Well-groomed, well-nourished, not disheveled, in no acute distress    Eyes: No scleral icterus, no proptosis     ENT -no obvious auditory deficits, wearing a face mask    Skin -no rashes or lesions noted     Respiratory-  breathing comfortable on room air, no audible wheezing    Cardiovascular- No lower extremity edema is noted.     Psychiatric- alert and oriented ×3. Normal affect.     Gait - normal gait, no use of ambulatory device, nonantalgic.    Musculoskeletal -   Cervical spine   Inspection: No deformities of the skin over the cervical spine. No rashes or lesions.    full  A/P ROM in all directions, without pain      No signs of muscular atrophy in bilateral upper extremities     Thoracic/Lumbar Spine/Sacral Spine/Hips   Inspection: No evidence of atrophy in bilateral lower extremities throughout     Pain with extension and quadrant loading bilaterally    Palpation:   No tenderness to palpation in midline at T1-T12 levels. No tenderness to palpation in the left and right of the midline T1-L5  palpation over SI joint: positive bilaterally    palpation over buttock: positive bilaterally      Lumbar spine Special tests  Neuro tension  Seated straight leg test negative bilaterally      Neuro       Key points for the international standards for neurological classification of spinal cord injury (ISNCSCI) to light touch.     Dermatome R L   L2 2 2   L3 2 2   L4 2 2   L5 1 2   S1 2 2   S2 2 2       Motor Exam Lower " Extremities    ? Myotome R L   Hip flexion L2 5 5   Knee extension L3 5 5   Ankle dorsiflexion L4 5 5   Toe extension L5 4+ 5   Ankle plantarflexion S1 5 5       Clonus of the ankle negative bilaterally     Reflexes  ?  R L   Patella  2+ 2+   Achilles   2+ 2+       MEDICAL DECISION MAKING    Medical records review: see under HPI section.     DATA    Labs:   Lab Results   Component Value Date/Time    SODIUM 137 01/08/2020 09:02 AM    POTASSIUM 4.0 01/08/2020 09:02 AM    CHLORIDE 105 01/08/2020 09:02 AM    CO2 25 01/08/2020 09:02 AM    ANION 7.0 01/08/2020 09:02 AM    GLUCOSE 104 (H) 01/08/2020 09:02 AM    BUN 13 01/08/2020 09:02 AM    CREATININE 0.88 01/08/2020 09:02 AM    CREATININE 0.9 03/08/2009 12:07 PM    CALCIUM 9.1 01/08/2020 09:02 AM    ASTSGOT 16 01/08/2020 09:02 AM    ALTSGPT 14 01/08/2020 09:02 AM    TBILIRUBIN 0.3 01/08/2020 09:02 AM    ALBUMIN 3.9 01/08/2020 09:02 AM    TOTPROTEIN 6.6 01/08/2020 09:02 AM    GLOBULIN 2.7 01/08/2020 09:02 AM    AGRATIO 1.4 01/08/2020 09:02 AM       Lab Results   Component Value Date/Time    PROTHROMBTM 11.4 03/05/2009 09:50 PM    INR 0.99 03/05/2009 09:50 PM        Lab Results   Component Value Date/Time    WBC 6.2 01/08/2020 09:02 AM    RBC 4.27 01/08/2020 09:02 AM    HEMOGLOBIN 11.4 (L) 01/08/2020 09:02 AM    HEMATOCRIT 38.1 01/08/2020 09:02 AM    MCV 89.2 01/08/2020 09:02 AM    MCH 26.7 (L) 01/08/2020 09:02 AM    MCHC 29.9 (L) 01/08/2020 09:02 AM    MPV 10.6 01/08/2020 09:02 AM    NEUTSPOLYS 72.20 (H) 08/26/2018 11:53 AM    LYMPHOCYTES 18.30 (L) 08/26/2018 11:53 AM    MONOCYTES 6.10 08/26/2018 11:53 AM    EOSINOPHILS 1.70 08/26/2018 11:53 AM    BASOPHILS 1.70 08/26/2018 11:53 AM    HYPOCHROMIA 1+ 04/22/2014 05:53 AM    ANISOCYTOSIS 1+ 08/26/2018 11:53 AM        Lab Results   Component Value Date/Time    HBA1C 5.5 01/08/2020 09:02 AM        Imaging: I personally reviewed following images, these are my reads  MRI lumbar spine 02/28/2020  At L4-5, there is mild disc bulge  without central or foraminal stenosis. Minimal facet arhropathy  Otherwise, unremarkable.    Xray lumbar spine 06/27/2016  There is note of retrolisthesis of L5-S1  Otherwise, normal series    IMAGING radiology reads. I reviewed the following radiology reads     MRI lumbar spine 02/28/2020  IMPRESSION:        1.  Minimal discal degenerative changes at the L4-5 level with mild annular bulging.          Results for orders placed during the hospital encounter of 06/23/16   DX-LUMBAR SPINE-2 OR 3 VIEWS    Impression 2 mm retrolisthesis of L5 on S1.                                           Diagnosis   Visit Diagnoses     ICD-10-CM   1. Lumbosacral pain  M54.5   2. Lumbar spondylosis  M47.816   3. Spondylolisthesis of lumbosacral region  M43.17           ASSESSMENT:  Kaylie Burciaga 35 y.o. female with primarily axial low back pain     Kaylie was seen today for follow-up.    Diagnoses and all orders for this visit:    Lumbosacral pain  -     REFERRAL TO PHYSIATRY (PMR)    Lumbar spondylosis  -     REFERRAL TO PHYSIATRY (PMR)    Spondylolisthesis of lumbosacral region           1. Continue with gabapentin 300mg po tid.  2. Continue home exercise program  3. Discussed diagnostic left and right medial branch blocks.  The risks benefits and alternatives to this procedure were discussed and the patient wishes to proceed with the procedure. Risks include but are not limited to damage to surrounding structures, infection, bleeding, worsening of pain which can be permanent, weakness which can be permanent. Benefits include pain relief, improved function. Alternatives includes not doing the procedure.    4. Continue vitamin D supplementation.  5. Encouraged her to continue with weight loss efforts.        Follow-up: Return for Hospital injection.    Thank you very much for asking me to participate in Kaylie Burciaga's care.  Please contact me with any questions or concerns.    Please note that this dictation was  created using voice recognition software. I have made every reasonable attempt to correct obvious errors but there may be errors of grammar and content that I may have overlooked prior to finalization of this note.      Jones Lambert MD  Physical Medicine and Rehabilitation  Interventional Spine and Sports Physiatry  [facility]

## 2020-05-25 DIAGNOSIS — Z01.810 PRE-OPERATIVE CARDIOVASCULAR EXAMINATION: ICD-10-CM

## 2020-05-25 DIAGNOSIS — Z01.812 PRE-OPERATIVE LABORATORY EXAMINATION: ICD-10-CM

## 2020-05-25 LAB
ANION GAP SERPL CALC-SCNC: 10 MMOL/L (ref 7–16)
BUN SERPL-MCNC: 8 MG/DL (ref 8–22)
CALCIUM SERPL-MCNC: 9.3 MG/DL (ref 8.5–10.5)
CHLORIDE SERPL-SCNC: 100 MMOL/L (ref 96–112)
CO2 SERPL-SCNC: 27 MMOL/L (ref 20–33)
CREAT SERPL-MCNC: 0.8 MG/DL (ref 0.5–1.4)
ERYTHROCYTE [DISTWIDTH] IN BLOOD BY AUTOMATED COUNT: 52.8 FL (ref 35.9–50)
GLUCOSE SERPL-MCNC: 92 MG/DL (ref 65–99)
HCT VFR BLD AUTO: 38.6 % (ref 37–47)
HGB BLD-MCNC: 11.6 G/DL (ref 12–16)
MCH RBC QN AUTO: 25.6 PG (ref 27–33)
MCHC RBC AUTO-ENTMCNC: 30.1 G/DL (ref 33.6–35)
MCV RBC AUTO: 85 FL (ref 81.4–97.8)
PLATELET # BLD AUTO: 347 K/UL (ref 164–446)
PMV BLD AUTO: 10.2 FL (ref 9–12.9)
POTASSIUM SERPL-SCNC: 4.6 MMOL/L (ref 3.6–5.5)
RBC # BLD AUTO: 4.54 M/UL (ref 4.2–5.4)
SODIUM SERPL-SCNC: 137 MMOL/L (ref 135–145)
WBC # BLD AUTO: 8.1 K/UL (ref 4.8–10.8)

## 2020-05-25 PROCEDURE — 36415 COLL VENOUS BLD VENIPUNCTURE: CPT

## 2020-05-25 PROCEDURE — 85027 COMPLETE CBC AUTOMATED: CPT

## 2020-05-25 PROCEDURE — 80048 BASIC METABOLIC PNL TOTAL CA: CPT

## 2020-05-25 ASSESSMENT — FIBROSIS 4 INDEX: FIB4 SCORE: 0.49

## 2020-05-26 ENCOUNTER — OFFICE VISIT (OUTPATIENT)
Dept: ADMISSIONS | Facility: MEDICAL CENTER | Age: 35
End: 2020-05-26
Attending: SPECIALIST
Payer: MEDICAID

## 2020-05-26 DIAGNOSIS — Z01.812 PRE-OPERATIVE LABORATORY EXAMINATION: ICD-10-CM

## 2020-05-26 LAB — COVID ORDER STATUS COVID19: NORMAL

## 2020-05-26 PROCEDURE — C9803 HOPD COVID-19 SPEC COLLECT: HCPCS

## 2020-05-27 LAB
SARS-COV-2 RNA RESP QL NAA+PROBE: NOT DETECTED
SPECIMEN SOURCE: NORMAL

## 2020-05-29 ENCOUNTER — ANESTHESIA (OUTPATIENT)
Dept: SURGERY | Facility: MEDICAL CENTER | Age: 35
End: 2020-05-29
Payer: MEDICAID

## 2020-05-29 ENCOUNTER — ANESTHESIA EVENT (OUTPATIENT)
Dept: SURGERY | Facility: MEDICAL CENTER | Age: 35
End: 2020-05-29
Payer: MEDICAID

## 2020-05-29 ENCOUNTER — HOSPITAL ENCOUNTER (OUTPATIENT)
Facility: MEDICAL CENTER | Age: 35
End: 2020-05-29
Attending: SPECIALIST | Admitting: SPECIALIST
Payer: MEDICAID

## 2020-05-29 VITALS
SYSTOLIC BLOOD PRESSURE: 139 MMHG | HEART RATE: 73 BPM | RESPIRATION RATE: 18 BRPM | OXYGEN SATURATION: 98 % | BODY MASS INDEX: 45.84 KG/M2 | DIASTOLIC BLOOD PRESSURE: 72 MMHG | TEMPERATURE: 97 F | WEIGHT: 268.52 LBS | HEIGHT: 64 IN

## 2020-05-29 DIAGNOSIS — G89.18 POST-OP PAIN: ICD-10-CM

## 2020-05-29 PROBLEM — N92.0 MENORRHAGIA: Status: ACTIVE | Noted: 2020-05-29

## 2020-05-29 LAB
EKG IMPRESSION: NORMAL
HCG UR QL: NEGATIVE
PATHOLOGY CONSULT NOTE: NORMAL

## 2020-05-29 PROCEDURE — 160046 HCHG PACU - 1ST 60 MINS PHASE II: Performed by: SPECIALIST

## 2020-05-29 PROCEDURE — 160036 HCHG PACU - EA ADDL 30 MINS PHASE I: Performed by: SPECIALIST

## 2020-05-29 PROCEDURE — 160035 HCHG PACU - 1ST 60 MINS PHASE I: Performed by: SPECIALIST

## 2020-05-29 PROCEDURE — 160048 HCHG OR STATISTICAL LEVEL 1-5: Performed by: SPECIALIST

## 2020-05-29 PROCEDURE — 700102 HCHG RX REV CODE 250 W/ 637 OVERRIDE(OP): Performed by: ANESTHESIOLOGY

## 2020-05-29 PROCEDURE — 700105 HCHG RX REV CODE 258: Performed by: SPECIALIST

## 2020-05-29 PROCEDURE — 160025 RECOVERY II MINUTES (STATS): Performed by: SPECIALIST

## 2020-05-29 PROCEDURE — 700111 HCHG RX REV CODE 636 W/ 250 OVERRIDE (IP): Performed by: ANESTHESIOLOGY

## 2020-05-29 PROCEDURE — 93010 ELECTROCARDIOGRAM REPORT: CPT | Performed by: INTERNAL MEDICINE

## 2020-05-29 PROCEDURE — 160039 HCHG SURGERY MINUTES - EA ADDL 1 MIN LEVEL 3: Performed by: SPECIALIST

## 2020-05-29 PROCEDURE — 160002 HCHG RECOVERY MINUTES (STAT): Performed by: SPECIALIST

## 2020-05-29 PROCEDURE — 501394 HCHG SOLUTION SORBITOL 3% 3000ML BAG: Performed by: SPECIALIST

## 2020-05-29 PROCEDURE — 88305 TISSUE EXAM BY PATHOLOGIST: CPT

## 2020-05-29 PROCEDURE — 500594 HCHG GELPORT: Performed by: SPECIALIST

## 2020-05-29 PROCEDURE — 81025 URINE PREGNANCY TEST: CPT

## 2020-05-29 PROCEDURE — 160028 HCHG SURGERY MINUTES - 1ST 30 MINS LEVEL 3: Performed by: SPECIALIST

## 2020-05-29 PROCEDURE — A6404 STERILE GAUZE > 48 SQ IN: HCPCS | Performed by: SPECIALIST

## 2020-05-29 PROCEDURE — 700101 HCHG RX REV CODE 250: Performed by: ANESTHESIOLOGY

## 2020-05-29 PROCEDURE — A4338 INDWELLING CATHETER LATEX: HCPCS | Performed by: SPECIALIST

## 2020-05-29 PROCEDURE — A9270 NON-COVERED ITEM OR SERVICE: HCPCS | Performed by: SPECIALIST

## 2020-05-29 PROCEDURE — 88302 TISSUE EXAM BY PATHOLOGIST: CPT

## 2020-05-29 PROCEDURE — 500854 HCHG NEEDLE, INSUFFLATION FOR STEP: Performed by: SPECIALIST

## 2020-05-29 PROCEDURE — 93005 ELECTROCARDIOGRAM TRACING: CPT | Performed by: SPECIALIST

## 2020-05-29 PROCEDURE — 501838 HCHG SUTURE GENERAL: Performed by: SPECIALIST

## 2020-05-29 PROCEDURE — 502704 HCHG DEVICE, LIGASURE IMPACT: Performed by: SPECIALIST

## 2020-05-29 PROCEDURE — A9270 NON-COVERED ITEM OR SERVICE: HCPCS | Performed by: ANESTHESIOLOGY

## 2020-05-29 PROCEDURE — 500886 HCHG PACK, LAPAROSCOPY: Performed by: SPECIALIST

## 2020-05-29 PROCEDURE — 500002 HCHG ADHESIVE, DERMABOND: Performed by: SPECIALIST

## 2020-05-29 PROCEDURE — 160009 HCHG ANES TIME/MIN: Performed by: SPECIALIST

## 2020-05-29 PROCEDURE — 700102 HCHG RX REV CODE 250 W/ 637 OVERRIDE(OP): Performed by: SPECIALIST

## 2020-05-29 PROCEDURE — 502587 HCHG PACK, D&C: Performed by: SPECIALIST

## 2020-05-29 PROCEDURE — 501577 HCHG TROCAR, STEP 11MM: Performed by: SPECIALIST

## 2020-05-29 PROCEDURE — 501579 HCHG TROCAR, STEP 5MM: Performed by: SPECIALIST

## 2020-05-29 RX ORDER — SODIUM CHLORIDE, SODIUM LACTATE, POTASSIUM CHLORIDE, CALCIUM CHLORIDE 600; 310; 30; 20 MG/100ML; MG/100ML; MG/100ML; MG/100ML
INJECTION, SOLUTION INTRAVENOUS CONTINUOUS
Status: DISCONTINUED | OUTPATIENT
Start: 2020-05-29 | End: 2020-05-29 | Stop reason: HOSPADM

## 2020-05-29 RX ORDER — HYDROMORPHONE HYDROCHLORIDE 1 MG/ML
0.2 INJECTION, SOLUTION INTRAMUSCULAR; INTRAVENOUS; SUBCUTANEOUS
Status: DISCONTINUED | OUTPATIENT
Start: 2020-05-29 | End: 2020-05-29 | Stop reason: HOSPADM

## 2020-05-29 RX ORDER — HYDROMORPHONE HYDROCHLORIDE 1 MG/ML
0.4 INJECTION, SOLUTION INTRAMUSCULAR; INTRAVENOUS; SUBCUTANEOUS
Status: DISCONTINUED | OUTPATIENT
Start: 2020-05-29 | End: 2020-05-29 | Stop reason: HOSPADM

## 2020-05-29 RX ORDER — KETOROLAC TROMETHAMINE 30 MG/ML
INJECTION, SOLUTION INTRAMUSCULAR; INTRAVENOUS PRN
Status: DISCONTINUED | OUTPATIENT
Start: 2020-05-29 | End: 2020-05-29 | Stop reason: SURG

## 2020-05-29 RX ORDER — ONDANSETRON 2 MG/ML
4 INJECTION INTRAMUSCULAR; INTRAVENOUS
Status: DISCONTINUED | OUTPATIENT
Start: 2020-05-29 | End: 2020-05-29 | Stop reason: HOSPADM

## 2020-05-29 RX ORDER — DIPHENHYDRAMINE HYDROCHLORIDE 50 MG/ML
12.5 INJECTION INTRAMUSCULAR; INTRAVENOUS
Status: DISCONTINUED | OUTPATIENT
Start: 2020-05-29 | End: 2020-05-29 | Stop reason: HOSPADM

## 2020-05-29 RX ORDER — OXYCODONE HCL 5 MG/5 ML
10 SOLUTION, ORAL ORAL
Status: COMPLETED | OUTPATIENT
Start: 2020-05-29 | End: 2020-05-29

## 2020-05-29 RX ORDER — IPRATROPIUM BROMIDE AND ALBUTEROL SULFATE 2.5; .5 MG/3ML; MG/3ML
3 SOLUTION RESPIRATORY (INHALATION)
Status: DISCONTINUED | OUTPATIENT
Start: 2020-05-29 | End: 2020-05-29 | Stop reason: HOSPADM

## 2020-05-29 RX ORDER — SIMETHICONE 80 MG
80 TABLET,CHEWABLE ORAL EVERY 8 HOURS PRN
Status: DISCONTINUED | OUTPATIENT
Start: 2020-05-29 | End: 2020-05-29 | Stop reason: HOSPADM

## 2020-05-29 RX ORDER — ONDANSETRON 2 MG/ML
INJECTION INTRAMUSCULAR; INTRAVENOUS PRN
Status: DISCONTINUED | OUTPATIENT
Start: 2020-05-29 | End: 2020-05-29 | Stop reason: SURG

## 2020-05-29 RX ORDER — MIDAZOLAM HYDROCHLORIDE 1 MG/ML
1 INJECTION INTRAMUSCULAR; INTRAVENOUS
Status: DISCONTINUED | OUTPATIENT
Start: 2020-05-29 | End: 2020-05-29 | Stop reason: HOSPADM

## 2020-05-29 RX ORDER — CEFAZOLIN SODIUM 1 G/3ML
INJECTION, POWDER, FOR SOLUTION INTRAMUSCULAR; INTRAVENOUS PRN
Status: DISCONTINUED | OUTPATIENT
Start: 2020-05-29 | End: 2020-05-29 | Stop reason: SURG

## 2020-05-29 RX ORDER — HYDRALAZINE HYDROCHLORIDE 20 MG/ML
5 INJECTION INTRAMUSCULAR; INTRAVENOUS
Status: DISCONTINUED | OUTPATIENT
Start: 2020-05-29 | End: 2020-05-29 | Stop reason: HOSPADM

## 2020-05-29 RX ORDER — IBUPROFEN 800 MG/1
800 TABLET ORAL EVERY 8 HOURS PRN
Qty: 30 TAB | Refills: 0 | Status: SHIPPED | OUTPATIENT
Start: 2020-05-29 | End: 2020-08-24

## 2020-05-29 RX ORDER — MEPERIDINE HYDROCHLORIDE 25 MG/ML
12.5 INJECTION INTRAMUSCULAR; INTRAVENOUS; SUBCUTANEOUS
Status: DISCONTINUED | OUTPATIENT
Start: 2020-05-29 | End: 2020-05-29 | Stop reason: HOSPADM

## 2020-05-29 RX ORDER — HYDROMORPHONE HYDROCHLORIDE 2 MG/ML
INJECTION, SOLUTION INTRAMUSCULAR; INTRAVENOUS; SUBCUTANEOUS PRN
Status: DISCONTINUED | OUTPATIENT
Start: 2020-05-29 | End: 2020-05-29 | Stop reason: SURG

## 2020-05-29 RX ORDER — OXYCODONE HYDROCHLORIDE AND ACETAMINOPHEN 5; 325 MG/1; MG/1
1 TABLET ORAL EVERY 6 HOURS PRN
Qty: 28 TAB | Refills: 0 | Status: SHIPPED | OUTPATIENT
Start: 2020-05-29 | End: 2020-06-05

## 2020-05-29 RX ORDER — LIDOCAINE HYDROCHLORIDE 20 MG/ML
INJECTION, SOLUTION EPIDURAL; INFILTRATION; INTRACAUDAL; PERINEURAL PRN
Status: DISCONTINUED | OUTPATIENT
Start: 2020-05-29 | End: 2020-05-29 | Stop reason: SURG

## 2020-05-29 RX ORDER — HALOPERIDOL 5 MG/ML
1 INJECTION INTRAMUSCULAR
Status: DISCONTINUED | OUTPATIENT
Start: 2020-05-29 | End: 2020-05-29 | Stop reason: HOSPADM

## 2020-05-29 RX ORDER — DEXAMETHASONE SODIUM PHOSPHATE 4 MG/ML
INJECTION, SOLUTION INTRA-ARTICULAR; INTRALESIONAL; INTRAMUSCULAR; INTRAVENOUS; SOFT TISSUE PRN
Status: DISCONTINUED | OUTPATIENT
Start: 2020-05-29 | End: 2020-05-29 | Stop reason: HOSPADM

## 2020-05-29 RX ORDER — OXYCODONE HCL 5 MG/5 ML
5 SOLUTION, ORAL ORAL
Status: COMPLETED | OUTPATIENT
Start: 2020-05-29 | End: 2020-05-29

## 2020-05-29 RX ORDER — HYDROMORPHONE HYDROCHLORIDE 1 MG/ML
0.1 INJECTION, SOLUTION INTRAMUSCULAR; INTRAVENOUS; SUBCUTANEOUS
Status: DISCONTINUED | OUTPATIENT
Start: 2020-05-29 | End: 2020-05-29 | Stop reason: HOSPADM

## 2020-05-29 RX ADMIN — FENTANYL CITRATE 50 MCG: 50 INJECTION INTRAMUSCULAR; INTRAVENOUS at 15:16

## 2020-05-29 RX ADMIN — FENTANYL CITRATE 50 MCG: 50 INJECTION INTRAMUSCULAR; INTRAVENOUS at 15:56

## 2020-05-29 RX ADMIN — POVIDONE-IODINE 15 ML: 10 SOLUTION TOPICAL at 11:51

## 2020-05-29 RX ADMIN — KETOROLAC TROMETHAMINE 30 MG: 30 INJECTION, SOLUTION INTRAMUSCULAR at 13:23

## 2020-05-29 RX ADMIN — OXYCODONE HYDROCHLORIDE 10 MG: 5 SOLUTION ORAL at 15:17

## 2020-05-29 RX ADMIN — HYDROMORPHONE HYDROCHLORIDE 0.8 MG: 2 INJECTION, SOLUTION INTRAMUSCULAR; INTRAVENOUS; SUBCUTANEOUS at 14:24

## 2020-05-29 RX ADMIN — FENTANYL CITRATE 50 MCG: 50 INJECTION INTRAMUSCULAR; INTRAVENOUS at 15:37

## 2020-05-29 RX ADMIN — PROPOFOL 200 MG: 10 INJECTION, EMULSION INTRAVENOUS at 13:20

## 2020-05-29 RX ADMIN — SODIUM CHLORIDE, POTASSIUM CHLORIDE, SODIUM LACTATE AND CALCIUM CHLORIDE: 600; 310; 30; 20 INJECTION, SOLUTION INTRAVENOUS at 11:52

## 2020-05-29 RX ADMIN — ROCURONIUM BROMIDE 20 MG: 10 INJECTION, SOLUTION INTRAVENOUS at 14:06

## 2020-05-29 RX ADMIN — FENTANYL CITRATE 50 MCG: 50 INJECTION INTRAMUSCULAR; INTRAVENOUS at 14:16

## 2020-05-29 RX ADMIN — FENTANYL CITRATE 50 MCG: 50 INJECTION INTRAMUSCULAR; INTRAVENOUS at 15:43

## 2020-05-29 RX ADMIN — MIDAZOLAM 2 MG: 1 INJECTION INTRAMUSCULAR; INTRAVENOUS at 13:16

## 2020-05-29 RX ADMIN — SUGAMMADEX 200 MG: 100 INJECTION, SOLUTION INTRAVENOUS at 14:45

## 2020-05-29 RX ADMIN — ROCURONIUM BROMIDE 50 MG: 10 INJECTION, SOLUTION INTRAVENOUS at 13:20

## 2020-05-29 RX ADMIN — FENTANYL CITRATE 50 MCG: 50 INJECTION INTRAMUSCULAR; INTRAVENOUS at 13:16

## 2020-05-29 RX ADMIN — LIDOCAINE HYDROCHLORIDE 100 MG: 20 INJECTION, SOLUTION EPIDURAL; INFILTRATION; INTRACAUDAL at 13:20

## 2020-05-29 RX ADMIN — ONDANSETRON 4 MG: 2 INJECTION INTRAMUSCULAR; INTRAVENOUS at 14:38

## 2020-05-29 RX ADMIN — DEXAMETHASONE SODIUM PHOSPHATE 4 MG: 4 INJECTION, SOLUTION INTRA-ARTICULAR; INTRALESIONAL; INTRAMUSCULAR; INTRAVENOUS; SOFT TISSUE at 13:23

## 2020-05-29 RX ADMIN — CEFAZOLIN 3 G: 330 INJECTION, POWDER, FOR SOLUTION INTRAMUSCULAR; INTRAVENOUS at 13:23

## 2020-05-29 ASSESSMENT — PAIN SCALES - GENERAL: PAIN_LEVEL: 4

## 2020-05-29 NOTE — OR SURGEON
Immediate Post OP Note    PreOp Diagnosis:   Menorrhagia  Dysmenorrhea  The patient desires permanent tubal sterilization.    PostOp Diagnosis:   Menorrhagia  Dysmenorrhea  The patient desires permanent tubal sterilization.  (+) bilateral adnexal adhesions    Procedure(s):  PELVISCOPY - Wound Class: Clean Contaminated  SALPINGECTOMY - Wound Class: Clean  HYSTEROSCOPY, WITH VIDEO IMAGING  DILATION AND CURETTAGE - Wound Class: Clean Contaminated  ABLATION, ENDOMETRIUM, THERMAL, HYSTEROSCOPIC - Wound Class: Clean Contaminated    Surgeon(s):  Eder Shelton M.D.    Anesthesiologist/Type of Anesthesia:  Anesthesiologist: Demarco Gardiner M.D./General    Surgical Staff:  Circulator: Rossi Jane R.N.  Relief Circulator: Amanda Holder R.N.  Relief Scrub: Cristhian Arnold  Scrub Person: Andrews Douglas    Specimens removed if any:  ID Type Source Tests Collected by Time Destination   A : endometrial curettage Other Other PATHOLOGY SPECIMEN Eder Shelton M.D. 5/29/2020  2:20 PM    B : bilateral fallopian tubes Tissue Fallopian Tube PATHOLOGY SPECIMEN Eder Shelton M.D. 5/29/2020  2:21 PM        Estimated Blood Loss:   Less than 50 cc's     Findings:   Speculum exam reveals no vulvar or vaginal or cervical lesions. During hysteroscopy some evidence of endometrial polyps is seen.   When hysteroscopy is continues following completion of hydrothermal endometrial ablation the entire intrauterine cavity is found to be thoroughly ablated.   During laparoscopy both Fallopian tubes are found to be slightly enlarged and involved with adhesions, suggesting previous infection.  The uterine fundus appears normal.     Complications:   None.         5/29/2020 3:05 PM Eder Shelton M.D.

## 2020-05-29 NOTE — ANESTHESIA PREPROCEDURE EVALUATION
Relevant Problems   PULMONARY   (+) Mild intermittent asthma      ENDO   (+) Hypothyroidism due to acquired atrophy of thyroid      Other   (+) Morbid obesity with BMI of 40.0-44.9, adult (HCC)   (+) Polysubstance abuse (HCC)   (+) Tobacco abuse       Physical Exam    Airway   Mallampati: II  TM distance: >3 FB  Neck ROM: full       Cardiovascular - normal exam  Rhythm: regular  Rate: normal  (-) murmur     Dental - normal exam        Very poor dentition   Pulmonary - normal exam  Breath sounds clear to auscultation     Abdominal    Neurological - normal exam                 Anesthesia Plan    ASA 3 (See relevant problem list)       Plan - general       Airway plan will be ETT        Induction: intravenous    Postoperative Plan: Postoperative administration of opioids is intended.    Pertinent diagnostic labs and testing reviewed    Informed Consent:    Anesthetic plan and risks discussed with patient.    Use of blood products discussed with: patient whom consented to blood products.

## 2020-05-29 NOTE — ANESTHESIA QCDR
2019 [facility] Clinical Data Registry (for Quality Improvement)     Postoperative nausea/vomiting risk protocol (Adult = 18 yrs and Pediatric 3-17 yrs)- (430 and 463)  General inhalation anesthetic (NOT TIVA) with PONV risk factors: Yes  Provision of anti-emetic therapy with at least 2 different classes of agents: Yes   Patient DID NOT receive anti-emetic therapy and reason is documented in Medical Record:  N/A    Multimodal Pain Management- (477)  Non-emergent surgery AND patient age >= 18: Yes  Use of Multimodal Pain Management, two or more drugs and/or interventions, NOT including systemic opioids: Yes  Exception: Documented allergy to multiple classes of analgesics: N/A    Smoking Abstinence (404)  Patient is current smoker (cigarette, pipe, e-cig, marijuanna): Yes  Elective Surgery: Yes  Abstinence instructions provided prior to day of surgery: Yes  Patient abstained from smoking on day of surgery: Yes    Pre-Op Beta-Blocker in Isolated CABG (44)  Isolated CABG AND patient age >= 18: No  Beta-blocker admin within 24 hours of surgical incision:   Exception:of medical reason(s) for not administering beta blocker within 24 hours prior to surgical incision (e.g., not  indicated,other medical reason):     PACU assessment of acute postoperative pain prior to Anesthesia Care End- Applies to Patients Age = 18- (ABG7)  Initial PACU pain score is which of the following: < 7/10  Patient unable to report pain score: N/A    Post-anesthetic transfer of care checklist/protocol to PACU/ICU- (426 and 427)  Upon conclusion of case, patient transferred to which of the following locations: PACU/Non-ICU  Use of transfer checklist/protocol: Yes  Exclusion: Service Performed in Patient Hospital Room (and thus did not require transfer): N/A  Unplanned admission to ICU related to anesthesia service up through end of PACU care- (MD51)  Unplanned admission to ICU (not initially anticipated at anesthesia start time): No

## 2020-05-29 NOTE — ANESTHESIA PROCEDURE NOTES
Airway    Date/Time: 5/29/2020 1:21 PM  Performed by: Demarco Gardiner M.D.  Authorized by: Demarco Gardiner M.D.     Location:  OR  Urgency:  Elective  Indications for Airway Management:  Anesthesia      Spontaneous Ventilation: absent    Sedation Level:  Deep  Preoxygenated: Yes    Patient Position:  Sniffing  Final Airway Type:  Endotracheal airway  Final Endotracheal Airway:  ETT  Cuffed: Yes    Technique Used for Successful ETT Placement:  Video laryngoscopy    Insertion Site:  Oral  Blade Type:  Starr  Laryngoscope Blade/Videolaryngoscope Blade Size:  3  ETT Size (mm):  7.5  Measured from:  Lips  ETT to Lips (cm):  21  Placement Verified by: auscultation and capnometry    Cormack-Lehane Classification:  Grade I - full view of glottis  Number of Attempts at Approach:  1

## 2020-05-29 NOTE — H&P
Kaylie Burciaga          YOB: 1985  Date of procedure: Friday, May 29, 2020  Facility: [facility]    ID: The patient is a very pleasant 35-year-old multipara (para-3 and she has had 3 previous vaginal deliveries).    Chief Complaint: The patient complains of menorrhagia accompanied by dysmenorrhea.    History of Present Illness: The patient came to see me in the office on February 24, 2020.  At that time she presented with complaints of menorrhagia and dysmenorrhea.  She said that her menses are somewhat regular and that she does experience excessive blood flow with her menses and severe pain with her menses.  She also shared with me that she does smoke cigarettes.  Also of note she shared with me that she does desire permanent tubal sterilization.  She is scheduled to have hysteroscopy, D&C, and hydrothermal endometrial ablation along with laparoscopy with laparoscopic bilateral tubal sterilization, namely laparoscopic bilateral salpingectomy.    Past Medical History: The patient says that she has asthma and has had low back pain.    Past Surgical History: The patient says that she has had a cholecystectomy (in 2008) and that she has had a tonsillectomy (in 2001).    Medications: The patient says that she takes gabapentin and trazodone and that she sometimes uses asthma inhalers.  She says she takes Wellbutrin and Adderall.    Allergies: The patient says that she is allergic to morphine.    Social History: The patient says that she smokes about one half of a pack of cigarettes per day.  She says she rarely consumes alcoholic beverages.  She says that she does not use recreational drugs.  She says that in the past she had used marijuana and methamphetamines.    Review of Systems  General: The patient denies any fevers, chills, sweats.  Pulmonary: The patient denies any coughing, wheezing, chest pain, shortness of breath.  Cardiovascular: The patient denies any palpitations,  dyspnea, chest pain.  Gastrointestinal: The patient denies any nausea, vomiting, diarrhea, constipation, hematochezia, melena, history of hepatitis, history of jaundice.  Genitourinary: The patient complains of menorrhagia and dysmenorrhea.  Musculoskeletal: The patient denies any arthralgias or myalgias.   Neurological: No headaches or syncope or seizures.     Physical Exam:   Vital Signs: The patient's vital signs are stable and she is afebrile.  General: The patient appears well developed and well-nourished and relaxed and alert and comfortable and in no apparent distress.    HEENT :  Normo-cephalic, atraumatic, pupils equal, round, reactive to light and accommodation, extra ocular motions intact, pharynx clear; there is no thyromegaly. There is no cervical lymphadenopathy.  Chest: Heart regular rate and rhythm, with no murmurs or rubs or gallops; the lungs are clear to auscultation bilaterally.  Abdomen: The abdomen is obese and soft and flat and non-tender and non-distended. There is no hepatomegaly. There is no splenomegaly.  There are small laparoscopic surgical scars.  Pelvic: Speculum exam reveals no vulvar or vaginal or cervical lesions and no cervical or vaginal discharge for the vulva and  mucosa appear well estrogenized.  Extremities: No clubbing or cyanosis or edema.   Neurological: non-focal.     Assessment:   Menorrhagia  Dysmenorrhea  The patient desires permanent tubal sterilization.    Plan:   We will proceed with hysteroscopy, D&C, and hydrothermal endometrial ablation along with laparoscopy with laparoscopic bilateral tubal sterilization, namely laparoscopic bilateral salpingectomy.  I have discussed with the patient and explained to the patient in detail and at length what hysteroscopy, D&C, and hydrothermal endometrial ablation along with laparoscopy with laparoscopic bilateral tubal sterilization, namely laparoscopic bilateral salpingectomy, is, and what hysteroscopy, D&C, and  hydrothermal endometrial ablation along with laparoscopy with laparoscopic bilateral tubal sterilization, namely laparoscopic bilateral salpingectomy, involves, and I have discussed with her and explained to her in detail and at length the risks and benefits and alternatives of hysteroscopy, D&C, and hydrothermal endometrial ablation along with laparoscopy with laparoscopic bilateral tubal sterilization, namely laparoscopic bilateral salpingectomy.  After our discussions and after answering her questions she told me that she very much wishes for us to proceed with hysteroscopy, D&C, and hydrothermal endometrial ablation along with laparoscopy with laparoscopic bilateral tubal sterilization, namely laparoscopic bilateral salpingectomy.              ________________________  Eder Shelton M.D.

## 2020-05-29 NOTE — OR NURSING
1606 Pt report from Denae ARNOLD, pt brought over to PACU2.    1615 Pt up to change clothes, tolerated well.  Pt VSS.    1620 Pt meets d/c criteria, feels ready to go home. PIV removed, pt tolerated well.   1630 Pt escorted out via wheelchair to d/c home with boyfriend.  Discharge instructions reviewed with pt and .  Answered all questions and concerns.

## 2020-05-29 NOTE — DISCHARGE INSTRUCTIONS
ACTIVITY: Rest and take it easy for the first 24 hours.  A responsible adult is recommended to remain with you during that time.  It is normal to feel sleepy.  We encourage you to not do anything that requires balance, judgment or coordination.    MILD FLU-LIKE SYMPTOMS ARE NORMAL. YOU MAY EXPERIENCE GENERALIZED MUSCLE ACHES, THROAT IRRITATION, HEADACHE AND/OR SOME NAUSEA.    FOR 24 HOURS DO NOT:  Drive, operate machinery or run household appliances.  Drink beer or alcoholic beverages.   Make important decisions or sign legal documents.    SPECIAL INSTRUCTIONS: *SEE PELVISCOPY INSTRUCTION SHEET **    DIET: To avoid nausea, slowly advance diet as tolerated, avoiding spicy or greasy foods for the first day.  Add more substantial food to your diet according to your physician's instructions.  Babies can be fed formula or breast milk as soon as they are hungry.  INCREASE FLUIDS AND FIBER TO AVOID CONSTIPATION.    SURGICAL DRESSING/BATHING: *MAY SHOWER TOMORROW.  NO TUB BATHS, HOT TUBS OR SWIMMING UNTIL APPROVED BY PHYSICIAN**    FOLLOW-UP APPOINTMENT:  A follow-up appointment should be arranged with your doctor in *FOLLOW UP WITH DR. SADLER IN 2 WEEKS**; call to schedule.    You should CALL YOUR PHYSICIAN if you develop:  Fever greater than 101 degrees F.  Pain not relieved by medication, or persistent nausea or vomiting.  Excessive bleeding (blood soaking through dressing) or unexpected drainage from the wound.  Extreme redness or swelling around the incision site, drainage of pus or foul smelling drainage.  Inability to urinate or empty your bladder within 8 hours.  Problems with breathing or chest pain.    You should call 911 if you develop problems with breathing or chest pain.  If you are unable to contact your doctor or surgical center, you should go to the nearest emergency room or urgent care center.  Physician's telephone #: **DR. SADLER [phone]*    If any questions arise, call your doctor.  If your doctor is  not available, please feel free to call the Surgical Center at [phone].  The Center is open Monday through Friday from 7AM to 7PM.  You can also call the HEALTH HOTLINE open 24 hours/day, 7 days/week and speak to a nurse at [phone], or toll free at [phone].    A registered nurse may call you a few days after your surgery to see how you are doing after your procedure.    MEDICATIONS: Resume taking daily medication.  Take prescribed pain medication with food.  If no medication is prescribed, you may take non-aspirin pain medication if needed.  PAIN MEDICATION CAN BE VERY CONSTIPATING.  Take a stool softener or laxative such as senokot, pericolace, or milk of magnesia if needed.    Prescription given for **PERCOCET AND MOTRIN*.  Last pain medication given at *3:15 PM.    If your physician has prescribed pain medication that includes Acetaminophen (Tylenol), do not take additional Acetaminophen (Tylenol) while taking the prescribed medication.    Depression / Suicide Risk    As you are discharged from this [facility] facility, it is important to learn how to keep safe from harming yourself.    Recognize the warning signs:  · Abrupt changes in personality, positive or negative- including increase in energy   · Giving away possessions  · Change in eating patterns- significant weight changes-  positive or negative  · Change in sleeping patterns- unable to sleep or sleeping all the time   · Unwillingness or inability to communicate  · Depression  · Unusual sadness, discouragement and loneliness  · Talk of wanting to die  · Neglect of personal appearance   · Rebelliousness- reckless behavior  · Withdrawal from people/activities they love  · Confusion- inability to concentrate     If you or a loved one observes any of these behaviors or has concerns about self-harm, here's what you can do:  · Talk about it- your feelings and reasons for harming yourself  · Remove any means that you might use to hurt  yourself (examples: pills, rope, extension cords, firearm)  · Get professional help from the community (Mental Health, Substance Abuse, psychological counseling)  · Do not be alone:Call your Safe Contact- someone whom you trust who will be there for you.  · Call your local CRISIS HOTLINE 813-9894 or 722-358-2861  · Call your local Children's Mobile Crisis Response Team [place] (581) 473-3612 or www.Kadenze  · Call the toll free National Suicide Prevention Hotlines   · National Suicide Prevention Lifeline 999-552-NPCB (3189)  · National Hope Line Network 800-SUICIDE (497-8619)

## 2020-05-29 NOTE — ANESTHESIA TIME REPORT
Anesthesia Start and Stop Event Times     Date Time Event    5/29/2020 1246 Ready for Procedure     1315 Anesthesia Start     1506 Anesthesia Stop        Responsible Staff  05/29/20    Name Role Begin End    Demarco Gardiner M.D. Anesth 1315 1506        Preop Diagnosis (Free Text):  Pre-op Diagnosis     MENORRHAGIA, PERMANENT STERILIZATION        Preop Diagnosis (Codes):    Post op Diagnosis  Menorrhagia      Premium Reason  A. 3PM - 7AM    Comments:

## 2020-05-29 NOTE — OR NURSING
1502  RECEIVED PATIENT FROM OR.  REPOR FROM DR. NIETO.  NO AIRWAY IN PLACE.  RESPIRATIONS ARE EVEN AND UNLABORED.  GAUZE AND TEGADERM TO UMBILICUS ARE CDI.  SUPRA PUBIC BAND AID WITH SMALL AMOUNT OF BLOODY DRAINAGE NOTED.    1516  MEDICATED WITH IV FENTANYL FOR C/O ABDOMINAL PAIN 8.    1517  MEDICATED WITH PO OXYCODONE.        1537  MEDICATED WITH IV FENTANYL.      1543  MEDICATED WITH IV FENTANYL.    1556  MEDICATED WITH IV FENTANYL    1603  S.ODenis SEGAL CALLED AND LEFT A MESSAGE    1606  REPORT TO JUAN A ARNOLD.  TO PHASE 2.

## 2020-05-30 NOTE — OP REPORT
DATE OF SERVICE:  05/29/2020    PREOPERATIVE DIAGNOSES:  1.  Menorrhagia.  2.  Dysmenorrhea.  3.  The patient desires permanent tubal sterilization.    POSTOPERATIVE DIAGNOSES:  1.  Menorrhagia.  2.  Dysmenorrhea.  3.  The patient desires permanent tubal sterilization.  4.  At laparoscopy, bilateral adnexal adhesions are noted.    PROCEDURES:  1.  Hysteroscopy, D and C, hydrothermal endometrial ablation.  2.  Laparoscopy with laparoscopic bilateral lysis of adhesions and   laparoscopic bilateral salpingectomy.    SURGEON:  Eder Shelton MD    ANESTHESIA:  General endotracheal tube anesthesia.    ANESTHESIOLOGIST:  Demarco Gardiner MD    FINDINGS:  Speculum exam under anesthesia reveals no vulvar, vaginal or   cervical lesions.  The cervix was well visualized and was large and   multiparous.  During hysteroscopy, views of the intrauterine cavity were   obtained.  During hysteroscopy, no evidence of any congenital uterine anomaly   was seen and no evidence of submucosal fibroids was seen.  During   hysteroscopy, some evidence of endometrial polyps was seen.  When hysteroscopy   was continued following completion of hydrothermal endometrial ablation, the   entire intrauterine cavity appears thoroughly ablated.    During laparoscopy, bilateral adnexal adhesions were noted.  Both fallopian   tubes, particularly the right, appeared to be somewhat enlarged and these   findings of adhesions and enlargement suggests previous infectious process.    When laparoscopy was continued following bilateral salpingectomy, excellent   hemostasis was observed.    SPECIMENS:  1.  Endometrial curettings.  2.  Both fallopian tubes.    COMPLICATIONS:  None.    ESTIMATED BLOOD LOSS:  Less than 50 mL.    DESCRIPTION OF PROCEDURE:  After appropriate consents have been obtained, the   patient was taken to the operating room and given general anesthesia.  She was   prepped and draped in the dorsal lithotomy position and a Villatoro catheter was    noted to be in place and draining urine.  Speculum exam was performed and   reveals that there were no vulvar, vaginal or cervical lesions.  The cervix   was well visualized and was large and multiparous.  A single tooth tenaculum   at the single tooth tenaculum was applied to the anterior aspect of the   cervix.  The cervix was dilated with Hanks and then Hegar dilators and was   dilated to a Hegar #8.  The hysteroscope was advanced through the endocervical   canal into the intrauterine cavity.  Hysteroscopy was performed and findings   were as noted above.  Some evidence of endometrial polyp was seen.  The   hysteroscope was removed.  A sharp curette was introduced through the   endocervical canal into the intrauterine cavity and all 4 quadrants of the   intrauterine cavity were thoroughly curetted and curettings were submitted on   a Telfa pad.  The sharp curette was removed and the hysteroscope was   reinserted through the endocervical canal into the intrauterine cavity and   hysteroscopy was repeated.  Next, hydrothermal endometrial ablation was   performed.  After the usual 90-second warmup, hydrothermal endometrial   ablation was continued for 10 minutes.  Upon completion, the usual 90-second   cool down was performed and then hysteroscopy was continued and when   hysteroscopy was continued following completion of hydrothermal endometrial   ablation, the entire intrauterine cavity appears thoroughly ablated.  The   hysteroscope was removed.  During hysteroscopy, pictures were taken.  The   single tooth tenaculum was removed from the cervix.  The cervix was examined   and no bleeding was seen coming from the cervix, either from the site of   attachment of the single tooth tenaculum or from the external cervical os.    The speculum was then removed.  The 's gloves were changed.  Attention   was then directed to the abdomen where a small (approximately 1 to 1.5 cm)   horizontal infraumbilical incision  was made with a scalpel.  A Veress needle   was advanced through this incision into the peritoneal cavity and proper   placement in the peritoneal cavity was verified with Navas hanging drop   technique.  The peritoneal cavity was then insufflated with approximately 2 to   3 liters of carbon dioxide gas.  The Veress needle was removed and a 10 to 12   mm port was introduced through the infraumbilical incision into the   peritoneal cavity utilizing the VersaStep trocar system.  The central portion   of this port was removed and the 10 mm operative laparoscope was inserted   through the remaining sleeve and proper entry in the peritoneal cavity was   verified visually with laparoscope.  The patient was placed in Trendelenburg   position.  A 5 mm port was placed suprapubically under direct laparoscopic   visualization utilizing the VersaStep trocar system.  The long 5 mm LigaSure   laparoscopic bipolar cutting device was placed through the operative channel   of the operative laparoscope and the Prestige instrument was placed through   the suprapubic port.  The Prestige instrument was used to grasp the right   fallopian tube.  The right fallopian tube was clearly identified and followed   to its distal fimbriated end.  A picture of the distal fimbriae ended, end of   the right fallopian tube was then taken.  After the distal fimbriated end of   the right fallopian tube was clearly identified, the right fallopian tube was   resected (right salpingectomy was performed) in the usual fashion using the   LigaSure instrument by serially thoroughly cauterizing, sealing and cutting   the right mesosalpinx from distal to proximal in the usual fashion.  Adhesions   involving the right fallopian tube were cauterized, sealed and cut with   LigaSure instrument in the usual fashion.  The base of the right fallopian   tube was thoroughly cauterized, sealed and cut with LigaSure instrument.  The   LigaSure instrument was removed and  a long Prestige instrument was placed   through the operative channel of the operative laparoscope and used to grasp   the right fallopian tube.  The right fallopian tube was delivered through the   infraumbilical port along with laparoscope with some difficulty because the   distal end of the right fallopian tube was somewhat large.  However,   eventually, the right fallopian tube was successfully removed.  The   laparoscope was replaced through the infraumbilical port.  The LigaSure   instrument was placed through the operative channel of the operative   laparoscope.  The Prestige instrument was placed through the suprapubic port.    The left fallopian tube was clearly identified and followed to its distal   fimbriated end.  After the distal fimbriated end, the left fallopian tube was   clearly identified and after picture of left fallopian tube has been taken, a   left salpingectomy was performed with the LigaSure instrument in the usual   fashion by serially thoroughly cauterizing, sealing and cutting the left   mesosalpinx from distal to proximal in the usual fashion and during this   process, adhesions involving the left fallopian tube were lysed.  The base of   the left fallopian tube was thoroughly cauterized, sealed, and cut with   LigaSure instrument.  The LigaSure instrument was removed and the long   Prestige instrument was placed through the operative channel of the operative   laparoscope and used to grasp the left fallopian tube.  The left fallopian   tube was delivered along with laparoscope through the infraumbilical port and   left fallopian tube was submitted as a specimen.  The laparoscope was replaced   through the infraumbilical port.  The Prestige instrument was placed through   the suprapubic port and used to antevert the uterus and pictures were taken.    Excellent hemostasis was noted.  The patient was taken out of Trendelenburg   position.  The Prestige instrument and laparoscope was  removed.  Air was   allowed to evacuate the peritoneal cavity.  Both ports were removed.  The   fascia underneath the infraumbilical incision was identified with the use of S   retractors and reapproximated with placement of simple interrupted suture   using Vicryl.  The infraumbilical skin incision reapproximated with placement   of 3 interrupted buried sutures of 4-0 Monocryl placed in the dermis.  The   suprapubic skin incisions were reapproximated with placement of simple   interrupted buried sutures of 4-0 Monocryl placed in the dermis.  The   procedure terminated.  Final lap and needle counts reported to be correct x2   at the end of the procedure.  The patient tolerated the procedure well and   sent to postanesthesia recovery in stable condition.       ____________________________________     Eder Shelton MD    MED / NTS    DD:  05/29/2020 15:27:11  DT:  05/29/2020 17:49:51    D#:  [id number]  Job#:  [id number]    cc: Demarco Gardiner MD

## 2020-05-30 NOTE — ANESTHESIA POSTPROCEDURE EVALUATION
Patient: Kaylie Burciaga    Procedure Summary     Date:  05/29/20 Room / Location:  [facility] ROOM 23 / SURGERY SAME DAY [facility]    Anesthesia Start:  1315 Anesthesia Stop:  1506    Procedures:       PELVISCOPY (N/A Abdomen)      SALPINGECTOMY (Abdomen)      HYSTEROSCOPY, WITH VIDEO IMAGING (N/A Abdomen)      DILATION AND CURETTAGE (Abdomen)      ABLATION, ENDOMETRIUM, THERMAL, HYSTEROSCOPIC (N/A Abdomen) Diagnosis:  (MENORRHAGIA, PERMANENT STERILIZATION)    Surgeon:  Eder Shelton M.D. Responsible Provider:  Demarco Gardiner M.D.    Anesthesia Type:  general ASA Status:  3          Final Anesthesia Type: general  Last vitals  BP   Blood Pressure: 139/72    Temp   36.1 °C (97 °F)    Pulse   Pulse: 73   Resp   18    SpO2   98 %      Anesthesia Post Evaluation    Patient location during evaluation: PACU  Patient participation: complete - patient participated  Level of consciousness: awake and alert  Pain score: 4    Airway patency: patent  Anesthetic complications: no  Cardiovascular status: hemodynamically stable  Respiratory status: acceptable  Hydration status: euvolemic    PONV: none

## 2020-06-15 NOTE — NON-PROVIDER
PAT call made 06/15/2020 at 1225. No answer at number provided. Message and call back number provided. Pt informed that she must return this call before 1500 Tuesday to complete screening and pre-procedure instructions or her case will have to be rescheduled.

## 2020-06-17 ENCOUNTER — HOSPITAL ENCOUNTER (OUTPATIENT)
Dept: PAIN MANAGEMENT | Facility: REHABILITATION | Age: 35
End: 2020-06-17
Attending: PHYSICAL MEDICINE & REHABILITATION
Payer: MEDICAID

## 2020-06-18 NOTE — PATIENT INSTRUCTIONS
Fingernail or Toenail Loss  All or part of your fingernail or toenail has been lost. This may or may not grow back as a normal nail. A special non-stick bandage has been put on your finger or toe tightly to prevent bleeding.  HOME CARE INSTRUCTIONS   The tips of fingers and toes are full of nerves and injuries are often very painful. The following will help you decrease the pain and obtain the best outcome.  · Keep your hand or foot elevated above your heart to relieve pain and swelling. This will require lying in bed or on a couch with the hand or leg on pillows or sitting in a recliner with the leg up. Letting your hand or leg dangle may increase swelling, slow healing and cause throbbing pain.  · Keep your dressing dry and clean.  · Change your bandage in 24 hours after going home.  · After 48 hours, whenyour bandage is changed, soak your hand or foot in warm soapy water for 10 to 20 minutes. Do this 3 times per day. This helps reduce pain and swelling. After soaking, apply a clean, dry bandage. Change your bandage if it is wet or dirty.  · Only take over-the-counter or prescription medicines for pain, discomfort, or fever as directed by your caregiver.  · See your caregiver as needed for problems.  SEEK IMMEDIATE MEDICAL CARE IF:   · You have increased pain, swelling, drainage, or bleeding.  · You have a fever.  MAKE SURE YOU:   · Understand these instructions.  · Will watch your condition.  · Will get help right away if you are not doing well or get worse.  Document Released: 11/09/2007 Document Revised: 03/11/2013 Document Reviewed: 01/29/2008  [employer]® Patient Information ©2014 [employer].    
Statement Selected

## 2020-07-02 ENCOUNTER — TELEPHONE (OUTPATIENT)
Dept: PHYSICAL MEDICINE AND REHAB | Facility: MEDICAL CENTER | Age: 35
End: 2020-07-02

## 2020-08-23 ENCOUNTER — HOSPITAL ENCOUNTER (EMERGENCY)
Facility: MEDICAL CENTER | Age: 35
End: 2020-08-24
Attending: EMERGENCY MEDICINE | Admitting: EMERGENCY MEDICINE
Payer: MEDICAID

## 2020-08-23 DIAGNOSIS — F29 PSYCHOSIS, UNSPECIFIED PSYCHOSIS TYPE (HCC): ICD-10-CM

## 2020-08-23 DIAGNOSIS — R45.850 HOMICIDAL BEHAVIOR: ICD-10-CM

## 2020-08-23 LAB
AMPHET UR QL SCN: POSITIVE
BARBITURATES UR QL SCN: NEGATIVE
BENZODIAZ UR QL SCN: NEGATIVE
BZE UR QL SCN: NEGATIVE
CANNABINOIDS UR QL SCN: POSITIVE
METHADONE UR QL SCN: NEGATIVE
OPIATES UR QL SCN: NEGATIVE
OXYCODONE UR QL SCN: NEGATIVE
PCP UR QL SCN: NEGATIVE
POC BREATHALIZER: 0 PERCENT (ref 0–0.01)
PROPOXYPH UR QL SCN: NEGATIVE

## 2020-08-23 PROCEDURE — 80307 DRUG TEST PRSMV CHEM ANLYZR: CPT

## 2020-08-23 PROCEDURE — 302970 POC BREATHALIZER

## 2020-08-23 PROCEDURE — 700111 HCHG RX REV CODE 636 W/ 250 OVERRIDE (IP)

## 2020-08-23 PROCEDURE — 302970 POC BREATHALIZER: Performed by: EMERGENCY MEDICINE

## 2020-08-23 PROCEDURE — 96372 THER/PROPH/DIAG INJ SC/IM: CPT

## 2020-08-23 PROCEDURE — 99285 EMERGENCY DEPT VISIT HI MDM: CPT

## 2020-08-23 PROCEDURE — 81025 URINE PREGNANCY TEST: CPT

## 2020-08-23 RX ORDER — LORAZEPAM 2 MG/ML
INJECTION INTRAMUSCULAR
Status: COMPLETED
Start: 2020-08-23 | End: 2020-08-23

## 2020-08-23 RX ORDER — DIPHENHYDRAMINE HYDROCHLORIDE 50 MG/ML
50 INJECTION INTRAMUSCULAR; INTRAVENOUS ONCE
Status: COMPLETED | OUTPATIENT
Start: 2020-08-23 | End: 2020-08-23

## 2020-08-23 RX ORDER — HALOPERIDOL 5 MG/ML
5 INJECTION INTRAMUSCULAR ONCE
Status: COMPLETED | OUTPATIENT
Start: 2020-08-23 | End: 2020-08-23

## 2020-08-23 RX ORDER — LORAZEPAM 2 MG/ML
2 INJECTION INTRAMUSCULAR ONCE
Status: COMPLETED | OUTPATIENT
Start: 2020-08-23 | End: 2020-08-23

## 2020-08-23 RX ORDER — DIPHENHYDRAMINE HYDROCHLORIDE 50 MG/ML
INJECTION INTRAMUSCULAR; INTRAVENOUS
Status: COMPLETED
Start: 2020-08-23 | End: 2020-08-23

## 2020-08-23 RX ORDER — HALOPERIDOL 5 MG/ML
INJECTION INTRAMUSCULAR
Status: COMPLETED
Start: 2020-08-23 | End: 2020-08-23

## 2020-08-23 RX ADMIN — DIPHENHYDRAMINE HYDROCHLORIDE 50 MG: 50 INJECTION, SOLUTION INTRAMUSCULAR; INTRAVENOUS at 21:15

## 2020-08-23 RX ADMIN — DIPHENHYDRAMINE HYDROCHLORIDE 50 MG: 50 INJECTION INTRAMUSCULAR; INTRAVENOUS at 21:15

## 2020-08-23 RX ADMIN — HALOPERIDOL 5 MG: 5 INJECTION INTRAMUSCULAR at 21:15

## 2020-08-23 RX ADMIN — LORAZEPAM 2 MG: 2 INJECTION INTRAMUSCULAR; INTRAVENOUS at 21:15

## 2020-08-23 RX ADMIN — HALOPERIDOL LACTATE 5 MG: 5 INJECTION, SOLUTION INTRAMUSCULAR at 21:15

## 2020-08-23 RX ADMIN — LORAZEPAM 2 MG: 2 INJECTION INTRAMUSCULAR at 21:15

## 2020-08-23 ASSESSMENT — FIBROSIS 4 INDEX: FIB4 SCORE: 0.44
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
## 2020-08-24 VITALS
HEIGHT: 64 IN | RESPIRATION RATE: 16 BRPM | TEMPERATURE: 97.7 F | HEART RATE: 80 BPM | OXYGEN SATURATION: 96 % | WEIGHT: 232 LBS | SYSTOLIC BLOOD PRESSURE: 124 MMHG | BODY MASS INDEX: 39.61 KG/M2 | DIASTOLIC BLOOD PRESSURE: 87 MMHG

## 2020-08-24 LAB — HCG UR QL: NEGATIVE

## 2020-08-24 RX ORDER — DEXTROAMPHETAMINE SACCHARATE, AMPHETAMINE ASPARTATE, DEXTROAMPHETAMINE SULFATE AND AMPHETAMINE SULFATE 2.5; 2.5; 2.5; 2.5 MG/1; MG/1; MG/1; MG/1
10 TABLET ORAL 2 TIMES DAILY
COMMUNITY

## 2020-08-24 RX ORDER — FLUTICASONE FUROATE 100 UG/1
1 POWDER RESPIRATORY (INHALATION) DAILY
COMMUNITY

## 2020-08-24 NOTE — ED NOTES
1:1 sitter by pt room. Pt can be seen from nurses station. Pt laying in bed, sleeping. No signs of acute distress. Respirations even and unlabored.

## 2020-08-24 NOTE — ED NOTES
Med Rec completed per patient and [facility]  Allergies reviewed  No ORAL antibiotics in last 14 days    Unable to verify Adderall due to not having access to [facility]

## 2020-08-24 NOTE — ED NOTES
Pt appears to be sleeping in bed w/ even chest rise and fall. No needs at this time. Security sitter in direct view of pt.

## 2020-08-24 NOTE — PSYCHIATRY
PSYCHIATRY CONSULT TEAM NOTE: Consult received. Patient's legal hold will be assessed within 24 hours.    Thank you.
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
## 2020-08-24 NOTE — ED NOTES
Assist RN: Patient ambulatory to and from BR with steady gait under constant supervision by ED RN and Security. Urine specimen provided by patient sent to lab per orders. Security 1:1 remains at bedside.

## 2020-08-24 NOTE — DISCHARGE PLANNING
MTM contacted and transportation authorization arranged with [facility].  PCS completed and faxed to [facility].  Transportation time arranged for 1300    Transfer Packet completed with Original Legal Hold Placed inside.  RN updated on transfer and transfer time.  RN aware of [facility] Number [id number]    Denise at [facility] updated on 1300 [facility] transportation time.

## 2020-08-24 NOTE — ED NOTES
Pt walked to restroom. Assisted by nurse. Pt walked back to room and given meal tray. Pt tolerating well. 1:1 sitter at bedside. Respirations even and unlabored. No signs of acute distress.

## 2020-08-24 NOTE — CONSULTS
Alert Team  Duplicate order.  Pt already seen by Alert Team; see consult from Darrell Younger last night.  Pt has active order to be seen by psychiatry.  SW is awaiting word back from inpt psych facilities; packet faxed around 5am and pt has [place] Medicaid, so possible she could transfer.  ANTHONY

## 2020-08-24 NOTE — ED NOTES
Pt appears to be sleeping w/ even chest rise and fall. No needs at this time. Security sitter in direct view of pt.

## 2020-08-24 NOTE — ED NOTES
Pt transferred to [facility] via EMS. Pt walked out of department. Steady gait. respirations even and unlabored.

## 2020-08-24 NOTE — ED NOTES
Received report on pt. Pt appears to be sleeping w/ even chest rise and fall. No needs at this time. Security in direct view of pt.

## 2020-08-24 NOTE — DISCHARGE PLANNING
Denise from [facility] called and they will accept Pt.  Dr Kearns will be admitting physician.  They request a 1300 transportation time.     SW will arrange transportation and update RN.

## 2020-08-24 NOTE — ED NOTES
Pt resting in bed. Pt provided water and crackers. No other needs at this time. Security sitter in direct view of pt.
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
## 2020-08-24 NOTE — DISCHARGE PLANNING
Medical Social Work    MSW contacted Doug with Alert Team as copy of legal hold had not been provided to [facility].  Doug provided this worker with a copy of pt's legal hold.  Unable to send mental health referrals as there is no pregnancy test.  Bedside RN made aware.  [facility] will send mental health referrals once pregnancy test is available.

## 2020-08-24 NOTE — DISCHARGE PLANNING
Medical Social Work    Referral: Legal Hold    Intervention: Legal Hold Paperwork given to SW by [employer] RN: Doug    Legal Hold Initiated: Date: 08/23/2020  Time: 1634    Legal Hold faxed: Date: 08/24/2020  Time: 5080    Patient’s Insurance Listed on Face Sheet: [place] Medicaid    Referrals sent to: [facility]    Plan: Patient will transfer to mental health facility once acceptance is obtained.

## 2020-08-24 NOTE — ED NOTES
Pt behaving erratically in room; noted to be squatting and urinating on floor and suddenly ran out of the room to the restroom where she removed her gown and began to frantically scrub the restroom walls with soap and toilet paper; this RN knocked on the door and asked if she needed help to which she asked for another gown which was provided; pt began speaking to herself and repeating phrases and went back to her room where she continued to pace and mutter to herself; Dr. Riley aware of pt's escalating behavior and gave verbal order for medications; meds were administered to pt and she was assisted to climb onto the gurney; staff then proceeded to clean up the pile of sheets pt had urinated on; while removing sheets, the pile fell open and a large amount of loose stool fell onto the hallway floor; floor cleaned and pt noted to be resting on gurney w equal chest rise and fall; 1:1  sitter in view of pt

## 2020-09-18 ENCOUNTER — PHARMACY VISIT (OUTPATIENT)
Dept: PHARMACY | Facility: MEDICAL CENTER | Age: 35
End: 2020-09-18
Payer: COMMERCIAL

## 2020-09-18 PROCEDURE — RXMED WILLOW AMBULATORY MEDICATION CHARGE: Performed by: PSYCHIATRY & NEUROLOGY

## 2020-09-18 RX ORDER — RISPERIDONE 4 MG/1
4 TABLET ORAL
Qty: 30 TAB | Refills: 0 | OUTPATIENT
Start: 2020-09-01

## 2020-10-30 ENCOUNTER — HOSPITAL ENCOUNTER (EMERGENCY)
Facility: MEDICAL CENTER | Age: 35
End: 2020-10-30
Attending: EMERGENCY MEDICINE
Payer: MEDICAID

## 2020-10-30 VITALS
HEART RATE: 100 BPM | OXYGEN SATURATION: 95 % | HEIGHT: 64 IN | TEMPERATURE: 98.2 F | WEIGHT: 232 LBS | DIASTOLIC BLOOD PRESSURE: 70 MMHG | SYSTOLIC BLOOD PRESSURE: 135 MMHG | RESPIRATION RATE: 18 BRPM | BODY MASS INDEX: 39.61 KG/M2

## 2020-10-30 DIAGNOSIS — R41.82 ALTERED MENTAL STATUS, UNSPECIFIED ALTERED MENTAL STATUS TYPE: ICD-10-CM

## 2020-10-30 PROCEDURE — 99283 EMERGENCY DEPT VISIT LOW MDM: CPT

## 2020-10-30 ASSESSMENT — LIFESTYLE VARIABLES
TOTAL SCORE: 0
TOTAL SCORE: 0
HAVE PEOPLE ANNOYED YOU BY CRITICIZING YOUR DRINKING: NO
DO YOU DRINK ALCOHOL: NO
DOES PATIENT WANT TO STOP DRINKING: NO
HAVE YOU EVER FELT YOU SHOULD CUT DOWN ON YOUR DRINKING: NO
EVER HAD A DRINK FIRST THING IN THE MORNING TO STEADY YOUR NERVES TO GET RID OF A HANGOVER: NO
EVER FELT BAD OR GUILTY ABOUT YOUR DRINKING: NO
TOTAL SCORE: 0
CONSUMPTION TOTAL: INCOMPLETE

## 2020-10-30 ASSESSMENT — FIBROSIS 4 INDEX: FIB4 SCORE: 0.44
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
## 2020-10-30 NOTE — ED NOTES
Pt given water for po challenge. Tolerating well. No signs of acute distress or issues with swallowing.
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
## 2020-10-30 NOTE — ED NOTES
Pt walked to restroom. Pt has staggering gait but refused any assistance. Otherwise no signs of acute distress. Respirations even and unlabored.

## 2020-11-22 ENCOUNTER — APPOINTMENT (OUTPATIENT)
Dept: RADIOLOGY | Facility: MEDICAL CENTER | Age: 35
End: 2020-11-22
Attending: EMERGENCY MEDICINE
Payer: MEDICAID

## 2020-11-22 ENCOUNTER — HOSPITAL ENCOUNTER (EMERGENCY)
Facility: MEDICAL CENTER | Age: 35
End: 2020-11-22
Attending: EMERGENCY MEDICINE
Payer: MEDICAID

## 2020-11-22 VITALS
BODY MASS INDEX: 39.48 KG/M2 | HEART RATE: 65 BPM | SYSTOLIC BLOOD PRESSURE: 119 MMHG | RESPIRATION RATE: 19 BRPM | TEMPERATURE: 98.2 F | WEIGHT: 230 LBS | OXYGEN SATURATION: 96 % | DIASTOLIC BLOOD PRESSURE: 62 MMHG

## 2020-11-22 DIAGNOSIS — M25.521 RIGHT ELBOW PAIN: ICD-10-CM

## 2020-11-22 DIAGNOSIS — M25.561 RIGHT KNEE PAIN, UNSPECIFIED CHRONICITY: ICD-10-CM
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
## 2020-11-22 PROCEDURE — 99283 EMERGENCY DEPT VISIT LOW MDM: CPT

## 2020-11-22 PROCEDURE — 73080 X-RAY EXAM OF ELBOW: CPT | Mod: RT

## 2020-11-22 PROCEDURE — 87591 N.GONORRHOEAE DNA AMP PROB: CPT

## 2020-11-22 PROCEDURE — 73564 X-RAY EXAM KNEE 4 OR MORE: CPT | Mod: RT

## 2020-11-22 PROCEDURE — 87491 CHLMYD TRACH DNA AMP PROBE: CPT

## 2020-11-22 ASSESSMENT — FIBROSIS 4 INDEX: FIB4 SCORE: 0.44
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
## 2020-11-23 ENCOUNTER — PHARMACY VISIT (OUTPATIENT)
Dept: PHARMACY | Facility: MEDICAL CENTER | Age: 35
End: 2020-11-23
Payer: COMMERCIAL

## 2020-11-23 LAB
C TRACH DNA SPEC QL NAA+PROBE: NEGATIVE
N GONORRHOEA DNA SPEC QL NAA+PROBE: POSITIVE
SPECIMEN SOURCE: ABNORMAL

## 2020-11-23 PROCEDURE — RXMED WILLOW AMBULATORY MEDICATION CHARGE: Performed by: NURSE PRACTITIONER

## 2020-11-23 PROCEDURE — RXMED WILLOW AMBULATORY MEDICATION CHARGE: Performed by: PHYSICAL MEDICINE & REHABILITATION

## 2020-11-23 PROCEDURE — RXMED WILLOW AMBULATORY MEDICATION CHARGE: Performed by: PSYCHIATRY & NEUROLOGY

## 2020-11-23 RX ORDER — IBUPROFEN 800 MG/1
TABLET ORAL
Qty: 90 TAB | Refills: 5 | OUTPATIENT
Start: 2020-01-23 | End: 2021-01-22

## 2020-11-23 RX ORDER — FLUTICASONE FUROATE 100 UG/1
POWDER RESPIRATORY (INHALATION)
Qty: 30 EACH | Refills: 11 | OUTPATIENT
Start: 2020-01-23 | End: 2021-01-22

## 2020-11-23 RX ORDER — ALBUTEROL SULFATE 90 UG/1
AEROSOL, METERED RESPIRATORY (INHALATION)
Qty: 8.5 G | Refills: 11 | OUTPATIENT
Start: 2019-12-16 | End: 2020-12-18

## 2020-11-23 RX ORDER — GABAPENTIN 300 MG/1
CAPSULE ORAL
Qty: 90 CAP | Refills: 2 | OUTPATIENT
Start: 2020-03-17 | End: 2021-03-17

## 2020-11-23 RX ORDER — ACETAMINOPHEN 500 MG
TABLET ORAL
Qty: 60 TAB | Refills: 5 | OUTPATIENT
Start: 2020-01-23 | End: 2021-01-22

## 2020-11-23 RX ORDER — ERGOCALCIFEROL 1.25 MG/1
CAPSULE ORAL
Qty: 12 CAP | Refills: 5 | OUTPATIENT
Start: 2020-01-09 | End: 2021-01-08

## 2020-11-23 RX ORDER — BUPROPION HYDROCHLORIDE 150 MG/1
TABLET ORAL
Qty: 30 TAB | Refills: 1 | OUTPATIENT
Start: 2020-04-16 | End: 2021-04-16

## 2020-11-23 RX ORDER — BUPROPION HYDROCHLORIDE 300 MG/1
TABLET ORAL
Qty: 30 TAB | Refills: 1 | OUTPATIENT
Start: 2020-04-16 | End: 2021-04-16
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
## 2021-02-01 NOTE — ED NOTES
Pt appears to be sleeping w/ even chest rise and fall. No needs at this time. Security sitter in direct view of pt.    Subjective   Patient ID: Chantel is a 57 year old female.    Chief Complaint   Patient presents with   • Follow-up   • Annual Exam     HPI  Pt here for px and pt will rtc for fasting labs, pt had episode of vertigo one day last week,need to eat more, pt with no chest pain or sob.    .  Current Outpatient Medications   Medication Sig Dispense Refill   • lisinopril (ZESTRIL) 20 MG tablet Take 1 tablet by mouth daily. 30 tablet 0   • atorvastatin (LIPITOR) 20 MG tablet Take 1 tablet by mouth at bedtime. 90 tablet 0   • Multiple Vitamins-Minerals (MULTIVITAMIN ADULT PO)   Daily, 0 Refill(s), 05/16/18 9:13:00 CDT     • aspirin 81 MG tablet Take 81 mg by mouth.       No current facility-administered medications for this visit.      .ALLERGIES:  Erythromycin    .  Social History     Socioeconomic History   • Marital status: Single     Spouse name: Not on file   • Number of children: Not on file   • Years of education: Not on file   • Highest education level: Not on file   Occupational History   • Not on file   Social Needs   • Financial resource strain: Not on file   • Food insecurity     Worry: Not on file     Inability: Not on file   • Transportation needs     Medical: Not on file     Non-medical: Not on file   Tobacco Use   • Smoking status: Former Smoker     Types: Cigarettes   • Smokeless tobacco: Never Used   Substance and Sexual Activity   • Alcohol use: Yes     Frequency: 2-3 times a week     Drinks per session: 1 or 2     Comment: social   • Drug use: No   • Sexual activity: Yes     Partners: Male     Birth control/protection: None   Lifestyle   • Physical activity     Days per week: 0 days     Minutes per session: Not on file   • Stress: Not at all   Relationships   • Social connections     Talks on phone: Not on file     Gets together: Not on file     Attends Mandaeism service: Not on file     Active member of club or organization: Not on file     Attends meetings of clubs or organizations: Not on file      Relationship status: Not on file   • Intimate partner violence     Fear of current or ex partner: Not on file     Emotionally abused: Not on file     Physically abused: Not on file     Forced sexual activity: Not on file   Other Topics Concern   • Not on file   Social History Narrative   • Not on file     .History reviewed. No pertinent family history.   .  Past Surgical History:   Procedure Laterality Date   • Fracture surgery     • Hysterectomy     • Tonsillectomy       Review of Systems   Constitutional: Negative.    HENT: Negative.    Eyes: Negative.    Respiratory: Negative.    Cardiovascular: Negative.    Endocrine: Negative.    Genitourinary: Negative.    Musculoskeletal: Negative.    Skin: Negative.    Allergic/Immunologic: Negative.    Neurological: Positive for light-headedness.   Psychiatric/Behavioral: Negative.        Objective   Physical Exam  Vitals signs and nursing note reviewed.   Constitutional:       Appearance: Normal appearance.   HENT:      Head: Normocephalic.      Right Ear: Tympanic membrane normal.      Left Ear: Tympanic membrane normal.      Nose: Nose normal.   Eyes:      Pupils: Pupils are equal, round, and reactive to light.   Neck:      Musculoskeletal: Normal range of motion.   Cardiovascular:      Rate and Rhythm: Normal rate and regular rhythm.      Pulses: Normal pulses.      Heart sounds: Normal heart sounds.   Pulmonary:      Effort: Pulmonary effort is normal.   Abdominal:      General: Bowel sounds are normal.      Palpations: Abdomen is soft.   Musculoskeletal: Normal range of motion.   Skin:     General: Skin is warm.   Neurological:      Mental Status: She is alert and oriented to person, place, and time.   Psychiatric:         Behavior: Behavior normal.         .  Office Visit on 12/16/2020   Component Date Value Ref Range Status   • POCT Color 12/16/2020 Yellow   Final   • POCT Appearance 12/16/2020 Clear   Final   • POCT Glucose Urine 12/16/2020 Negative  Negative Final    • POCT Bilirubin 12/16/2020 Negative  Negative Final   • POCT Ketones 12/16/2020 Negative  Negative Final   • POCT Specific Gravity 12/16/2020 1.020  1.005 - 1.03 Final   • POCT Occult Blood 12/16/2020 Negative  Negative Final   • POCT pH 12/16/2020 6.0  5 - 9 Final   • POCT Protein 12/16/2020 Negative  Negative Final   • POCT Urobilinogen 12/16/2020 0.2  0 - 1 mg/dL Final   • Urine Nitrite 12/16/2020 Negative  Negative Final   • WBC (Leukocyte) Esterase POC 12/16/2020 Negative  Negative Final   • Chlamydia trachomatis by Nucleic A* 12/16/2020 Negative  Negative Final   • Neisseria gonorrhoeae by Nucleic A* 12/16/2020 Negative  Negative Final   • Disclaimer 12/16/2020    Final                    Value:This result contains rich text formatting which cannot be displayed here.   • CANDIDA SPECIES DNA PROBE 12/16/2020 Negative  Negative Final   • GARDNERELLA DNA PROBE 12/16/2020 Positive* Negative Final   • TRICHOMONAS VAGINALIS DNA PROBE 12/16/2020 Negative  Negative Final     Assessment   Problem List Items Addressed This Visit        Respiratory    HETAL (obstructive sleep apnea)       Circulatory    Essential hypertension       Digestive    Vitamin D deficiency    Obesity (BMI 30-39.9)       Endocrine    Hypercholesterolemia    Prediabetes       Other    Visit for screening mammogram    Relevant Orders    MAMMO SCREENING BILATERAL W SHANIA    Routine history and physical examination of adult - Primary    Relevant Orders    IRON AND TOTAL IRON BINDING CAPACITY    FERRITIN    URINALYSIS & REFLEX MICROSCOPY WITH CULTURE IF INDICATED    VITAMIN D -25 HYDROXY    VITAMIN B12    COMPREHENSIVE METABOLIC PANEL    CBC WITH DIFFERENTIAL    THYROID STIMULATING HORMONE    GLYCOHEMOGLOBIN    LIPID PANEL WITH REFLEX    FOLATE          .  Visit Vitals  /68 (BP Location: LUE - Left upper extremity, Patient Position: Sitting, Cuff Size: Large Adult)   Pulse 86   Temp 97.1 °F (36.2 °C) (Skin)   Resp 18   Ht 5' 7\" (1.702 m)   Wt  98.6 kg (217 lb 6 oz)   LMP  (LMP Unknown)   SpO2 98%   BMI 34.05 kg/m²       Health Maintenance Summary     Influenza Vaccine (1)  Postponed until 6/30/2021    Shingles Vaccine (1 of 2)  Postponed until 9/22/2021    Colorectal Cancer Screen-   Postponed until 9/22/2021    Cervical Cancer Screening HPV CO-Testing (Every 5 Years)  Next due on 12/12/2021    Depression Screening (Yearly)  Next due on 2/1/2022    Breast Cancer Screening (Every 2 Years)  Ordered on 9/22/2020    DTaP/Tdap/Td Vaccine (2 - Td)  Next due on 1/25/2029    Hepatitis C Screening   Completed    Hepatitis B Vaccine   Aged Out    Meningococcal Vaccine   Aged Out    HPV Vaccine   Aged Out    Pneumococcal Vaccine 0-64   Aged Out      Plan:    Schedule follow up: 4-6 weeks for f/u  rtc for fasting labs  HETAL- pt has mouthpiece  htn- stable with meds  Low vit d- check vit d level  Obesity-weight loss  Prediabetes- decrease sugar in diet  High chol- healthier eating  Mammogram ordered
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
## 2022-09-19 ENCOUNTER — HOSPITAL ENCOUNTER (OUTPATIENT)
Dept: LAB | Facility: MEDICAL CENTER | Age: 37
End: 2022-09-19
Attending: NURSE PRACTITIONER
Payer: MEDICAID

## 2022-09-19 LAB
ALBUMIN SERPL BCP-MCNC: 4.2 G/DL (ref 3.2–4.9)
ALBUMIN/GLOB SERPL: 1.6 G/DL
ALP SERPL-CCNC: 69 U/L (ref 30–99)
ALT SERPL-CCNC: 12 U/L (ref 2–50)
ANION GAP SERPL CALC-SCNC: 9 MMOL/L (ref 7–16)
AST SERPL-CCNC: 12 U/L (ref 12–45)
BASOPHILS # BLD AUTO: 0.7 % (ref 0–1.8)
BASOPHILS # BLD: 0.05 K/UL (ref 0–0.12)
BILIRUB SERPL-MCNC: 0.3 MG/DL (ref 0.1–1.5)
BUN SERPL-MCNC: 7 MG/DL (ref 8–22)
C TRACH DNA SPEC QL NAA+PROBE: NEGATIVE
CALCIUM SERPL-MCNC: 8.9 MG/DL (ref 8.5–10.5)
CHLORIDE SERPL-SCNC: 107 MMOL/L (ref 96–112)
CHOLEST SERPL-MCNC: 203 MG/DL (ref 100–199)
CO2 SERPL-SCNC: 25 MMOL/L (ref 20–33)
CREAT SERPL-MCNC: 0.95 MG/DL (ref 0.5–1.4)
EOSINOPHIL # BLD AUTO: 0.08 K/UL (ref 0–0.51)
EOSINOPHIL NFR BLD: 1.1 % (ref 0–6.9)
ERYTHROCYTE [DISTWIDTH] IN BLOOD BY AUTOMATED COUNT: 48.1 FL (ref 35.9–50)
EST. AVERAGE GLUCOSE BLD GHB EST-MCNC: 120 MG/DL
ESTRADIOL SERPL-MCNC: 26.3 PG/ML
FASTING STATUS PATIENT QL REPORTED: NORMAL
FERRITIN SERPL-MCNC: 14.2 NG/ML (ref 10–291)
FOLATE SERPL-MCNC: 4.6 NG/ML
GFR SERPLBLD CREATININE-BSD FMLA CKD-EPI: 78 ML/MIN/1.73 M 2
GLOBULIN SER CALC-MCNC: 2.6 G/DL (ref 1.9–3.5)
GLUCOSE SERPL-MCNC: 103 MG/DL (ref 65–99)
HAV IGM SERPL QL IA: NORMAL
HBA1C MFR BLD: 5.8 % (ref 4–5.6)
HBV CORE IGM SER QL: NORMAL
HBV SURFACE AG SER QL: NORMAL
HCT VFR BLD AUTO: 40.6 % (ref 37–47)
HCV AB SER QL: NORMAL
HDLC SERPL-MCNC: 31 MG/DL
HGB BLD-MCNC: 13.3 G/DL (ref 12–16)
HIV 1+2 AB+HIV1 P24 AG SERPL QL IA: NORMAL
IMM GRANULOCYTES # BLD AUTO: 0.02 K/UL (ref 0–0.11)
IMM GRANULOCYTES NFR BLD AUTO: 0.3 % (ref 0–0.9)
IRON SATN MFR SERPL: 12 % (ref 15–55)
IRON SERPL-MCNC: 40 UG/DL (ref 40–170)
LDLC SERPL CALC-MCNC: 124 MG/DL
LYMPHOCYTES # BLD AUTO: 1.34 K/UL (ref 1–4.8)
LYMPHOCYTES NFR BLD: 18.3 % (ref 22–41)
MCH RBC QN AUTO: 28.8 PG (ref 27–33)
MCHC RBC AUTO-ENTMCNC: 32.8 G/DL (ref 33.6–35)
MCV RBC AUTO: 87.9 FL (ref 81.4–97.8)
MONOCYTES # BLD AUTO: 0.43 K/UL (ref 0–0.85)
MONOCYTES NFR BLD AUTO: 5.9 % (ref 0–13.4)
N GONORRHOEA DNA SPEC QL NAA+PROBE: NEGATIVE
NEUTROPHILS # BLD AUTO: 5.4 K/UL (ref 2–7.15)
NEUTROPHILS NFR BLD: 73.7 % (ref 44–72)
NRBC # BLD AUTO: 0 K/UL
NRBC BLD-RTO: 0 /100 WBC
PLATELET # BLD AUTO: 302 K/UL (ref 164–446)
PMV BLD AUTO: 10.4 FL (ref 9–12.9)
POTASSIUM SERPL-SCNC: 4.2 MMOL/L (ref 3.6–5.5)
PROGEST SERPL-MCNC: 0.11 NG/ML
PROT SERPL-MCNC: 6.8 G/DL (ref 6–8.2)
RBC # BLD AUTO: 4.62 M/UL (ref 4.2–5.4)
SODIUM SERPL-SCNC: 141 MMOL/L (ref 135–145)
SPECIMEN SOURCE: NORMAL
T PALLIDUM AB SER QL IA: NORMAL
T3FREE SERPL-MCNC: 2.28 PG/ML (ref 2–4.4)
T4 FREE SERPL-MCNC: 0.9 NG/DL (ref 0.93–1.7)
THYROPEROXIDASE AB SERPL-ACNC: <9 IU/ML (ref 0–9)
TIBC SERPL-MCNC: 347 UG/DL (ref 250–450)
TRIGL SERPL-MCNC: 241 MG/DL (ref 0–149)
TSH SERPL DL<=0.005 MIU/L-ACNC: 2.26 UIU/ML (ref 0.38–5.33)
UIBC SERPL-MCNC: 307 UG/DL (ref 110–370)
VIT B12 SERPL-MCNC: 347 PG/ML (ref 211–911)
WBC # BLD AUTO: 7.3 K/UL (ref 4.8–10.8)

## 2022-09-19 PROCEDURE — 87591 N.GONORRHOEAE DNA AMP PROB: CPT

## 2022-09-19 PROCEDURE — 84402 ASSAY OF FREE TESTOSTERONE: CPT

## 2022-09-19 PROCEDURE — 84443 ASSAY THYROID STIM HORMONE: CPT

## 2022-09-19 PROCEDURE — 82607 VITAMIN B-12: CPT

## 2022-09-19 PROCEDURE — 83036 HEMOGLOBIN GLYCOSYLATED A1C: CPT

## 2022-09-19 PROCEDURE — 87389 HIV-1 AG W/HIV-1&-2 AB AG IA: CPT

## 2022-09-19 PROCEDURE — 36415 COLL VENOUS BLD VENIPUNCTURE: CPT

## 2022-09-19 PROCEDURE — 82728 ASSAY OF FERRITIN: CPT

## 2022-09-19 PROCEDURE — 84403 ASSAY OF TOTAL TESTOSTERONE: CPT

## 2022-09-19 PROCEDURE — 83540 ASSAY OF IRON: CPT

## 2022-09-19 PROCEDURE — 86376 MICROSOMAL ANTIBODY EACH: CPT

## 2022-09-19 PROCEDURE — 82670 ASSAY OF TOTAL ESTRADIOL: CPT

## 2022-09-19 PROCEDURE — 82746 ASSAY OF FOLIC ACID SERUM: CPT

## 2022-09-19 PROCEDURE — 85025 COMPLETE CBC W/AUTO DIFF WBC: CPT

## 2022-09-19 PROCEDURE — 84144 ASSAY OF PROGESTERONE: CPT

## 2022-09-19 PROCEDURE — 86800 THYROGLOBULIN ANTIBODY: CPT

## 2022-09-19 PROCEDURE — 86780 TREPONEMA PALLIDUM: CPT

## 2022-09-19 PROCEDURE — 84439 ASSAY OF FREE THYROXINE: CPT

## 2022-09-19 PROCEDURE — 80061 LIPID PANEL: CPT

## 2022-09-19 PROCEDURE — 84481 FREE ASSAY (FT-3): CPT

## 2022-09-19 PROCEDURE — 83550 IRON BINDING TEST: CPT

## 2022-09-19 PROCEDURE — 87491 CHLMYD TRACH DNA AMP PROBE: CPT

## 2022-09-19 PROCEDURE — 84270 ASSAY OF SEX HORMONE GLOBUL: CPT

## 2022-09-19 PROCEDURE — 80074 ACUTE HEPATITIS PANEL: CPT

## 2022-09-19 PROCEDURE — 80053 COMPREHEN METABOLIC PANEL: CPT

## 2022-09-22 LAB — THYROGLOB AB SERPL-ACNC: <0.9 IU/ML (ref 0–4)

## 2022-09-27 LAB
SHBG SERPL-SCNC: 26 NMOL/L (ref 25–122)
TESTOST FREE SERPL-MCNC: 2.4 PG/ML (ref 1.3–9.2)
TESTOST SERPL-MCNC: 13 NG/DL (ref 9–55)

## 2022-11-22 ENCOUNTER — HOSPITAL ENCOUNTER (EMERGENCY)
Facility: MEDICAL CENTER | Age: 37
End: 2022-11-23
Attending: EMERGENCY MEDICINE
Payer: MEDICAID

## 2022-11-22 DIAGNOSIS — F30.9 MANIA (HCC): ICD-10-CM

## 2022-11-22 DIAGNOSIS — R45.851 SUICIDAL IDEATION: ICD-10-CM

## 2022-11-22 DIAGNOSIS — R45.89 THREATENING TO OTHERS: ICD-10-CM

## 2022-11-22 PROCEDURE — 302970 POC BREATHALIZER: Performed by: EMERGENCY MEDICINE

## 2022-11-22 PROCEDURE — 700102 HCHG RX REV CODE 250 W/ 637 OVERRIDE(OP): Performed by: EMERGENCY MEDICINE

## 2022-11-22 PROCEDURE — 99285 EMERGENCY DEPT VISIT HI MDM: CPT

## 2022-11-22 PROCEDURE — 96372 THER/PROPH/DIAG INJ SC/IM: CPT

## 2022-11-22 PROCEDURE — 700111 HCHG RX REV CODE 636 W/ 250 OVERRIDE (IP): Performed by: EMERGENCY MEDICINE

## 2022-11-22 PROCEDURE — 80307 DRUG TEST PRSMV CHEM ANLYZR: CPT

## 2022-11-22 PROCEDURE — A9270 NON-COVERED ITEM OR SERVICE: HCPCS | Performed by: EMERGENCY MEDICINE

## 2022-11-22 PROCEDURE — 81025 URINE PREGNANCY TEST: CPT

## 2022-11-22 RX ORDER — QUETIAPINE FUMARATE 100 MG/1
100 TABLET, FILM COATED ORAL NIGHTLY
Status: DISCONTINUED | OUTPATIENT
Start: 2022-11-22 | End: 2022-11-23 | Stop reason: HOSPADM

## 2022-11-22 RX ORDER — HALOPERIDOL 5 MG/ML
10 INJECTION INTRAMUSCULAR ONCE
Status: COMPLETED | OUTPATIENT
Start: 2022-11-22 | End: 2022-11-22

## 2022-11-22 RX ORDER — HALOPERIDOL 5 MG/ML
10 INJECTION INTRAMUSCULAR ONCE
Status: DISCONTINUED | OUTPATIENT
Start: 2022-11-22 | End: 2022-11-22

## 2022-11-22 RX ORDER — NICOTINE 21 MG/24HR
1 PATCH, TRANSDERMAL 24 HOURS TRANSDERMAL ONCE
Status: DISCONTINUED | OUTPATIENT
Start: 2022-11-22 | End: 2022-11-23 | Stop reason: HOSPADM

## 2022-11-22 RX ORDER — LORAZEPAM 2 MG/1
2 TABLET ORAL EVERY 4 HOURS PRN
Status: DISCONTINUED | OUTPATIENT
Start: 2022-11-22 | End: 2022-11-23 | Stop reason: HOSPADM

## 2022-11-22 RX ADMIN — NICOTINE 14 MG: 14 PATCH TRANSDERMAL at 22:06

## 2022-11-22 RX ADMIN — QUETIAPINE FUMARATE 100 MG: 100 TABLET ORAL at 22:49

## 2022-11-22 RX ADMIN — HALOPERIDOL LACTATE 10 MG: 5 INJECTION, SOLUTION INTRAMUSCULAR at 18:17

## 2022-11-22 RX ADMIN — LORAZEPAM 2 MG: 2 TABLET ORAL at 22:49

## 2022-11-22 ASSESSMENT — FIBROSIS 4 INDEX: FIB4 SCORE: 0.44

## 2022-11-23 VITALS
TEMPERATURE: 97.8 F | HEART RATE: 92 BPM | SYSTOLIC BLOOD PRESSURE: 110 MMHG | WEIGHT: 220 LBS | DIASTOLIC BLOOD PRESSURE: 62 MMHG | BODY MASS INDEX: 37.56 KG/M2 | HEIGHT: 64 IN | RESPIRATION RATE: 16 BRPM | OXYGEN SATURATION: 91 %

## 2022-11-23 LAB — HCG UR QL: NEGATIVE

## 2022-11-23 PROCEDURE — 90791 PSYCH DIAGNOSTIC EVALUATION: CPT

## 2022-11-23 NOTE — ED NOTES
Security and nurse at bedside. Assessing pts ability to come out of restraints. Pt verbalized understanding of need behavior to come out. Pt has demonstrated improved behavior for last 1hr. Security is removing restraints at this time.
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
## 2022-11-23 NOTE — ED NOTES
Pt verbalizing understanding of change in behavior to have restraints removed. Called security. Switched pts left arm up and let out right arm and left leg. Pt is being cooperative at this time. Placed pt on bed pan and placed gown on pt.

## 2022-11-23 NOTE — DISCHARGE PLANNING
Alert Team:     Referral: Adult Patient Transfer to Mental Health Facility     Intervention: Received call from Dianna at [facility] stating that Dr. Pratt has accepted the patient for admission.  Facility requests that transport be arranged for 0800.     Arranged for transportation to be set up through Alex with [facility] for [facility] transport.     The pt will be picked up at 0800.      Notified the RN of the departure time as well as accepting facility.      Created transfer packet and placed on chart.     Plan: Pt will transfer to [facility] at 0800.

## 2022-11-23 NOTE — ED NOTES
Security sitter at bedside. Pt in 4 point restraints. Room secured and pts belongings secured not in room.

## 2022-11-23 NOTE — ED NOTES
Pt unable to urinate. Provided pt with Po fluids. Informed provider. Pt requesting nicotine and breathing treatment.
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
## 2022-11-23 NOTE — ED NOTES
Pt verbally aggressive with staff and threatening to harm staff. Security placed pt in 4 point hard restraints.

## 2022-12-04 ENCOUNTER — HOSPITAL ENCOUNTER (EMERGENCY)
Facility: MEDICAL CENTER | Age: 37
End: 2022-12-04
Attending: EMERGENCY MEDICINE
Payer: MEDICAID

## 2022-12-04 VITALS
HEART RATE: 105 BPM | BODY MASS INDEX: 40.42 KG/M2 | DIASTOLIC BLOOD PRESSURE: 85 MMHG | HEIGHT: 64 IN | SYSTOLIC BLOOD PRESSURE: 150 MMHG | TEMPERATURE: 98 F | RESPIRATION RATE: 20 BRPM | WEIGHT: 236.77 LBS | OXYGEN SATURATION: 96 %

## 2022-12-04 DIAGNOSIS — B00.9 HERPES: ICD-10-CM

## 2022-12-04 DIAGNOSIS — Z20.2 STD EXPOSURE: ICD-10-CM

## 2022-12-04 LAB
HIV 1+2 AB+HIV1 P24 AG SERPL QL IA: NORMAL
T PALLIDUM AB SER QL IA: NORMAL

## 2022-12-04 PROCEDURE — 99284 EMERGENCY DEPT VISIT MOD MDM: CPT

## 2022-12-04 PROCEDURE — 700111 HCHG RX REV CODE 636 W/ 250 OVERRIDE (IP): Performed by: EMERGENCY MEDICINE

## 2022-12-04 PROCEDURE — 87591 N.GONORRHOEAE DNA AMP PROB: CPT

## 2022-12-04 PROCEDURE — 87389 HIV-1 AG W/HIV-1&-2 AB AG IA: CPT

## 2022-12-04 PROCEDURE — 700102 HCHG RX REV CODE 250 W/ 637 OVERRIDE(OP): Performed by: EMERGENCY MEDICINE

## 2022-12-04 PROCEDURE — 36415 COLL VENOUS BLD VENIPUNCTURE: CPT

## 2022-12-04 PROCEDURE — 87529 HSV DNA AMP PROBE: CPT

## 2022-12-04 PROCEDURE — 87491 CHLMYD TRACH DNA AMP PROBE: CPT

## 2022-12-04 PROCEDURE — A9270 NON-COVERED ITEM OR SERVICE: HCPCS | Performed by: EMERGENCY MEDICINE

## 2022-12-04 PROCEDURE — 86780 TREPONEMA PALLIDUM: CPT

## 2022-12-04 PROCEDURE — 96372 THER/PROPH/DIAG INJ SC/IM: CPT

## 2022-12-04 RX ORDER — CEFTRIAXONE 500 MG/1
500 INJECTION, POWDER, FOR SOLUTION INTRAMUSCULAR; INTRAVENOUS ONCE
Status: COMPLETED | OUTPATIENT
Start: 2022-12-04 | End: 2022-12-04

## 2022-12-04 RX ORDER — VALACYCLOVIR HYDROCHLORIDE 1 G/1
1000 TABLET, FILM COATED ORAL 2 TIMES DAILY
Qty: 14 TABLET | Refills: 0 | Status: SHIPPED | OUTPATIENT
Start: 2022-12-04 | End: 2022-12-11

## 2022-12-04 RX ORDER — METRONIDAZOLE 500 MG/1
2000 TABLET ORAL ONCE
Status: COMPLETED | OUTPATIENT
Start: 2022-12-04 | End: 2022-12-04

## 2022-12-04 RX ORDER — FLUCONAZOLE 100 MG/1
200 TABLET ORAL ONCE
Status: COMPLETED | OUTPATIENT
Start: 2022-12-04 | End: 2022-12-04

## 2022-12-04 RX ORDER — AZITHROMYCIN 250 MG/1
1000 TABLET, FILM COATED ORAL ONCE
Status: COMPLETED | OUTPATIENT
Start: 2022-12-04 | End: 2022-12-04

## 2022-12-04 RX ADMIN — CEFTRIAXONE SODIUM 500 MG: 500 INJECTION, POWDER, FOR SOLUTION INTRAMUSCULAR; INTRAVENOUS at 18:46

## 2022-12-04 RX ADMIN — METRONIDAZOLE 2000 MG: 500 TABLET ORAL at 18:46

## 2022-12-04 RX ADMIN — FLUCONAZOLE 200 MG: 100 TABLET ORAL at 18:46

## 2022-12-04 RX ADMIN — AZITHROMYCIN MONOHYDRATE 1000 MG: 250 TABLET ORAL at 18:45

## 2022-12-04 ASSESSMENT — FIBROSIS 4 INDEX: FIB4 SCORE: 0.44

## 2022-12-05 LAB
C TRACH DNA GENITAL QL NAA+PROBE: NEGATIVE
N GONORRHOEA DNA GENITAL QL NAA+PROBE: NEGATIVE
SPECIMEN SOURCE: NORMAL

## 2022-12-05 NOTE — ED NOTES
Pt requesting to stay here in the hospital as she has nowhere to go  Discussed her options regarding her friends she called earlier and discussed she cannot stay in the hospital for reasons only because she has nowhere to go  Pt attempting phone calls to friends at this time

## 2022-12-05 NOTE — ED PROVIDER NOTES
"ED Provider Note    Scribed for Mook Felipe M.D. by Harini Lopez. 12/4/2022, 6:00 PM.    Primary care provider: OLINDA Marie  Means of arrival: walk-in  History obtained from: Patient  History limited by: None    CHIEF COMPLAINT  Chief Complaint   Patient presents with    Low Back Pain    Exposure to STD    Suicidal Ideation     HPI  Kaylie Burciaga is a 37 y.o. female who presents to the Emergency Department for intermittent lower back pain onset a year ago. The patient states possible exposure to an STI secondary to her boyfriend cheating on her. The patient notes she also came to the ED because she \"cannot go back home.\" The patient denies seeing her primary care physician for her back pain.  Patient does describe a vaginal discharge denies any overt abdominal pain fevers chills nausea vomiting does describe a back pain is in her lower back region that has been going on for about a year to a year and a half.    REVIEW OF SYSTEMS  See HPI above.    PAST MEDICAL HISTORY   has a past medical history of ADHD, Anxiety and depression, ASTHMA, bipolar, Blood transfusion (2010), Depression, Hemorrhagic disorder (HCC), Hypothyroidism due to acquired atrophy of thyroid (5/31/2016), Nausea & vomiting (1/15/2012), and Pain.    SURGICAL HISTORY   has a past surgical history that includes gastroscopy with biopsy (11/29/08); other (T & A 5 yrs ago); other abdominal surgery; tonsillectomy; marco by laparoscopy (12/1/08); lap,diagnostic abdomen (N/A, 5/29/2020); hysteroscopy,dx,sep proc (N/A, 5/29/2020); hysteroscopy,w/endometrial ablation (N/A, 5/29/2020); salpingectomy (5/29/2020); and dilation and curettage (5/29/2020).    SOCIAL HISTORY  Social History     Tobacco Use    Smoking status: Every Day     Packs/day: 0.50     Years: 20.00     Pack years: 10.00     Types: Cigarettes    Smokeless tobacco: Never    Tobacco comments:      Patient has cut down to less than half a pack a day.   Vaping " "Use    Vaping Use: Former   Substance Use Topics    Alcohol use: Yes     Comment: occ    Drug use: Yes     Comment: meth and marijuana      Social History     Substance and Sexual Activity   Drug Use Yes    Comment: meth and marijuana     FAMILY HISTORY  Family History   Problem Relation Age of Onset    Diabetes Mother         Insulin    Hypertension Mother     Alcohol/Drug Mother      CURRENT MEDICATIONS  Current Outpatient Medications   Medication Instructions    albuterol 108 (90 Base) MCG/ACT Aero Soln inhalation aerosol 2 Puffs, Inhalation, EVERY 6 HOURS PRN    amphetamine-dextroamphetamine (ADDERALL, 10MG,) 10 MG Tab 10 mg, Oral, 2 TIMES DAILY    buPROPion (WELLBUTRIN XL) 450 mg, Oral, EVERY MORNING    ergocalciferol (DRISDOL) 50,000 Units, Oral, EVERY 7 DAYS    Fluticasone Furoate (ARNUITY ELLIPTA) 100 MCG/ACT AEROSOL POWDER, BREATH ACTIVATED 1 Puff, Inhalation, DAILY    gabapentin (NEURONTIN) 300 mg, Oral, 3 TIMES DAILY    ibuprofen (MOTRIN) 800 mg, Oral, EVERY 8 HOURS PRN, Take with food.    prazosin (MINIPRESS) 2 mg, Oral, EVERY EVENING    risperidone (RISPERDAL) 4 mg, Oral, EVERY BEDTIME    traZODone (DESYREL) 50 mg, Oral, NIGHTLY PRN     ALLERGIES  Allergies   Allergen Reactions    Penicillins Anaphylaxis    Morphine Rash     Rash only     PHYSICAL EXAM  VITAL SIGNS: BP (!) 157/90   Pulse (!) 107   Temp 36.6 °C (97.9 °F) (Temporal)   Resp 16   Ht 1.626 m (5' 4\")   Wt 107 kg (236 lb 12.4 oz)   SpO2 94%   BMI 40.64 kg/m²     Constitutional: Well developed, Well nourished, No acute distress, Non-toxic appearance.   HENT: Normocephalic, Atraumatic, Bilateral external ears normal.  Eyes: conjunctiva is normal. There are no signs of exudate.  Neck: Supple.   Cardiovascular: Regular rate and rhythm without murmurs gallops or rubs.   Thorax & Lungs: No respiratory distress. Breathing comfortably. Lungs are clear to auscultation bilaterally, there are no wheezes no rales. Chest wall is nontender.  Abdomen: " Soft, nontender nondistended.  Skin: Left buttock cheek has a 2 cm in diameter herpetic lesion that is tender to palpation.   Back: Full range of motion. No tenderness, No CVA tenderness.  Musculoskeletal: Good range of motion in all major joints. No tenderness to palpation or major deformities noted. Intact distal pulses, no clubbing, no cyanosis, no edema,   Neurologic: Alert & oriented x 3, Normal motor function, Normal sensory function, No focal deficits noted.   Psychiatric: Affect normal, Judgment normal, Mood normal.     LABS  Results for orders placed or performed during the hospital encounter of 12/04/22   T.PALLIDUM AB BRUNO (Syphilis)   Result Value Ref Range    Syphilis, Treponemal Qual Non-Reactive Non-Reactive   HIV AG/AB Combo Assay Screening   Result Value Ref Range    HIV Ag/Ab Combo Assay Non-Reactive Non Reactive   HSV-1 AND HSV-2 SUBTYPE, PCR (Skin Lesion Swab)   Result Value Ref Range    HSV Subtype Source Vesicle fluid    Chlamydia/GC, PCR (Genital/Anal swab)   Result Value Ref Range    Source Genital      All labs reviewed by me.    COURSE & MEDICAL DECISION MAKING  Nursing notes, VS, PMSFHx reviewed in chart.    5:58 PM - Patient seen and examined at bedside. Patient will be treated with Diflucan tablet 200 mg, Flagyl 2,000 mg tablet, Zithromax 1,000 mg tablet, and Rocephin 500 mg injection. Ordered labs to evaluate her symptoms. I discussed with the patient that she most likely has herpes. I informed the patient of the current plan for treatment. I discussed plan for discharge and follow up as outlined below. The patient is stable for discharge at this time and will return for any new or worsening symptoms. Patient verbalizes understanding and support with my plan for discharge.     Decision Making:  Patient presents emerged department for evaluation.  Clinically the patient has no overt back tenderness midline she is able to touch her toes when standing.  She does have a herpetic lesion on  the left buttock region that is tender to palpation and that is where she states her discomfort is.  I will send for HSV 1/2 swabs and I do feel is most likely a herpes lesion.  I will start the patient on Valtrex for this.  Because she stated that she has been with a man and a lot of discharge she is concerned that there might be an STD we have tested for STDs clinically I will treat empirically with Rocephin, Zithromax, Flagyl.  RPR is negative HIV is negative.  At this point I feel the patient is stable for discharge when attempting to discharge the patient apparently she became very angry and she stated that she did not want a leave and apparently security was called to help escort the patient out.    The patient will return for new or worsening symptoms and is stable at the time of discharge.    The patient is referred to a primary physician for diabetic screening and for all other preventative health concerns.    DISPOSITION:  Patient will be discharged home in stable condition.    FOLLOW UP:  FIDENCIO MarieRDenisN.  [address] [place] [id number]  [phone]    Schedule an appointment as soon as possible for a visit in 1 week  For re-check    OUTPATIENT MEDICATIONS:  Discharge Medication List as of 12/4/2022  8:23 PM        START taking these medications    Details   valacyclovir (VALTREX) 1 GM Tab Take 1 Tablet by mouth 2 times a day for 7 days., Disp-14 Tablet, R-0, Normal           FINAL IMPRESSION  1. STD exposure    2. Herpes       I, Harini Lopez ([place]), am scribing for, and in the presence of, Mook Felipe M.D..    Electronically signed by: Harini Lopez ([place]), 12/4/2022    The note accurately reflects work and decisions made by me.  Mook Felipe M.D.  12/4/2022  9:37 PM

## 2022-12-05 NOTE — ED TRIAGE NOTES
Amb to triage with multiple c/o.  Patient c/o low back pain x 1 year but only when she is working.  Patient states she would like to be checked for STD's, reports white vaginal discharge and recent unprotected sex.  Patient reports suicidal ideation due to the back pain.  States her plan would be to overdose on Fentanyl, patient states she does not have any fentanyl.

## 2022-12-05 NOTE — ED NOTES
This RN attempted to review resources w/ pt and offered phone to call if needed  Pt refused, laying face down on bed and ignoring any questions  MTM called for ride for pt

## 2022-12-05 NOTE — ED NOTES
MTM secured [employer] and pt was agreeable  Provided juice and a blanket  Pt walked out to lobby exit for ride    Pt sits down and states ''my back is broken I cannot get up, I am going to another hospital for pain medications.''  Discussed the POC again with pt and Tierra will be here in a minute for pt ride  Security at pt side in exit  Discussed again she is medically cleared and not to be getting pain meds/narcotics   Pt waiting for [facility] ride    Charge RN notified of pt behavior

## 2022-12-05 NOTE — ED NOTES
Pt beginning to scream and yell and cry when attempting to be discharged. Wanting to stay in the hospital overnight and pt continued to be advised she has been cleared for d/c by ERP and cannot stay in the hospital  Pt yelling and demanding dilaudid for pain. Pt stating ''I am not leaving without dilaudid, you will have to drag me out''    MTM was offered to be called for pt to her destination and pt agreed    Pt then starts to state she is not leaving and pt advised that MTM can take her to her destination or security can escort her out  Pt screaming and yelling while this RN attempting to call MTM for pt ride    Pt stating ''you are throwing me out in pain, my back still hurts, the only thing that fixes this is dilaudid''  Pt told again she is cleared for dc and will not be getting dilaudid

## 2022-12-07 LAB
HSV1 DNA CSF QL NAA+PROBE: NOT DETECTED
HSV2 DNA CSF QL NAA+PROBE: DETECTED
SPECIMEN SOURCE: ABNORMAL

## 2022-12-25 ENCOUNTER — HOSPITAL ENCOUNTER (EMERGENCY)
Facility: MEDICAL CENTER | Age: 37
End: 2022-12-25
Attending: EMERGENCY MEDICINE
Payer: MEDICAID

## 2022-12-25 ENCOUNTER — APPOINTMENT (OUTPATIENT)
Dept: RADIOLOGY | Facility: MEDICAL CENTER | Age: 37
End: 2022-12-25
Attending: EMERGENCY MEDICINE
Payer: MEDICAID

## 2022-12-25 VITALS
OXYGEN SATURATION: 93 % | SYSTOLIC BLOOD PRESSURE: 118 MMHG | BODY MASS INDEX: 40.5 KG/M2 | TEMPERATURE: 98.4 F | DIASTOLIC BLOOD PRESSURE: 74 MMHG | RESPIRATION RATE: 18 BRPM | HEIGHT: 64 IN | WEIGHT: 237.22 LBS | HEART RATE: 96 BPM

## 2022-12-25 DIAGNOSIS — J06.9 VIRAL URI WITH COUGH: ICD-10-CM

## 2022-12-25 LAB
ANION GAP SERPL CALC-SCNC: 8 MMOL/L (ref 7–16)
BASOPHILS # BLD AUTO: 0.6 % (ref 0–1.8)
BASOPHILS # BLD: 0.04 K/UL (ref 0–0.12)
BUN SERPL-MCNC: 5 MG/DL (ref 8–22)
CALCIUM SERPL-MCNC: 8.7 MG/DL (ref 8.5–10.5)
CHLORIDE SERPL-SCNC: 105 MMOL/L (ref 96–112)
CO2 SERPL-SCNC: 26 MMOL/L (ref 20–33)
CREAT SERPL-MCNC: 0.82 MG/DL (ref 0.5–1.4)
EOSINOPHIL # BLD AUTO: 0.21 K/UL (ref 0–0.51)
EOSINOPHIL NFR BLD: 3.1 % (ref 0–6.9)
ERYTHROCYTE [DISTWIDTH] IN BLOOD BY AUTOMATED COUNT: 51.9 FL (ref 35.9–50)
GFR SERPLBLD CREATININE-BSD FMLA CKD-EPI: 93 ML/MIN/1.73 M 2
GLUCOSE SERPL-MCNC: 117 MG/DL (ref 65–99)
HCG SERPL QL: NEGATIVE
HCT VFR BLD AUTO: 34.6 % (ref 37–47)
HGB BLD-MCNC: 11.3 G/DL (ref 12–16)
IMM GRANULOCYTES # BLD AUTO: 0.02 K/UL (ref 0–0.11)
IMM GRANULOCYTES NFR BLD AUTO: 0.3 % (ref 0–0.9)
LYMPHOCYTES # BLD AUTO: 1.34 K/UL (ref 1–4.8)
LYMPHOCYTES NFR BLD: 19.9 % (ref 22–41)
MCH RBC QN AUTO: 28 PG (ref 27–33)
MCHC RBC AUTO-ENTMCNC: 32.7 G/DL (ref 33.6–35)
MCV RBC AUTO: 85.9 FL (ref 81.4–97.8)
MONOCYTES # BLD AUTO: 0.69 K/UL (ref 0–0.85)
MONOCYTES NFR BLD AUTO: 10.2 % (ref 0–13.4)
NEUTROPHILS # BLD AUTO: 4.44 K/UL (ref 2–7.15)
NEUTROPHILS NFR BLD: 65.9 % (ref 44–72)
NRBC # BLD AUTO: 0 K/UL
NRBC BLD-RTO: 0 /100 WBC
PLATELET # BLD AUTO: 333 K/UL (ref 164–446)
PMV BLD AUTO: 10.3 FL (ref 9–12.9)
POTASSIUM SERPL-SCNC: 3.4 MMOL/L (ref 3.6–5.5)
RBC # BLD AUTO: 4.03 M/UL (ref 4.2–5.4)
SODIUM SERPL-SCNC: 139 MMOL/L (ref 135–145)
WBC # BLD AUTO: 6.7 K/UL (ref 4.8–10.8)

## 2022-12-25 PROCEDURE — 80048 BASIC METABOLIC PNL TOTAL CA: CPT

## 2022-12-25 PROCEDURE — 700111 HCHG RX REV CODE 636 W/ 250 OVERRIDE (IP): Performed by: EMERGENCY MEDICINE

## 2022-12-25 PROCEDURE — 700105 HCHG RX REV CODE 258: Performed by: EMERGENCY MEDICINE

## 2022-12-25 PROCEDURE — 99284 EMERGENCY DEPT VISIT MOD MDM: CPT

## 2022-12-25 PROCEDURE — 85025 COMPLETE CBC W/AUTO DIFF WBC: CPT

## 2022-12-25 PROCEDURE — 71045 X-RAY EXAM CHEST 1 VIEW: CPT

## 2022-12-25 PROCEDURE — 84703 CHORIONIC GONADOTROPIN ASSAY: CPT

## 2022-12-25 PROCEDURE — 700102 HCHG RX REV CODE 250 W/ 637 OVERRIDE(OP): Performed by: EMERGENCY MEDICINE

## 2022-12-25 PROCEDURE — 36415 COLL VENOUS BLD VENIPUNCTURE: CPT

## 2022-12-25 PROCEDURE — A9270 NON-COVERED ITEM OR SERVICE: HCPCS | Performed by: EMERGENCY MEDICINE

## 2022-12-25 PROCEDURE — 700101 HCHG RX REV CODE 250: Performed by: EMERGENCY MEDICINE

## 2022-12-25 RX ORDER — ACETAMINOPHEN 325 MG/1
650 TABLET ORAL ONCE
Status: COMPLETED | OUTPATIENT
Start: 2022-12-25 | End: 2022-12-25

## 2022-12-25 RX ORDER — IPRATROPIUM BROMIDE AND ALBUTEROL SULFATE 2.5; .5 MG/3ML; MG/3ML
3 SOLUTION RESPIRATORY (INHALATION) ONCE
Status: COMPLETED | OUTPATIENT
Start: 2022-12-25 | End: 2022-12-25

## 2022-12-25 RX ORDER — PREDNISONE 20 MG/1
60 TABLET ORAL ONCE
Status: COMPLETED | OUTPATIENT
Start: 2022-12-25 | End: 2022-12-25

## 2022-12-25 RX ORDER — SODIUM CHLORIDE 9 MG/ML
1000 INJECTION, SOLUTION INTRAVENOUS ONCE
Status: COMPLETED | OUTPATIENT
Start: 2022-12-25 | End: 2022-12-25

## 2022-12-25 RX ORDER — ALBUTEROL SULFATE 90 UG/1
2 AEROSOL, METERED RESPIRATORY (INHALATION) EVERY 6 HOURS PRN
Qty: 8.5 G | Refills: 0 | Status: SHIPPED | OUTPATIENT
Start: 2022-12-25

## 2022-12-25 RX ORDER — PREDNISONE 20 MG/1
60 TABLET ORAL DAILY
Qty: 15 TABLET | Refills: 0 | Status: SHIPPED | OUTPATIENT
Start: 2022-12-25 | End: 2023-01-09

## 2022-12-25 RX ADMIN — SODIUM CHLORIDE 1000 ML: 9 INJECTION, SOLUTION INTRAVENOUS at 16:15

## 2022-12-25 RX ADMIN — PREDNISONE 60 MG: 20 TABLET ORAL at 16:31

## 2022-12-25 RX ADMIN — ACETAMINOPHEN 650 MG: 325 TABLET ORAL at 17:37

## 2022-12-25 RX ADMIN — IPRATROPIUM BROMIDE AND ALBUTEROL SULFATE 3 ML: 2.5; .5 SOLUTION RESPIRATORY (INHALATION) at 16:32

## 2022-12-25 ASSESSMENT — FIBROSIS 4 INDEX: FIB4 SCORE: 0.44

## 2022-12-25 NOTE — ED NOTES
"PT states \"my shins hurt\" also states \"I was at [facility] this morning and they gave me motrin and tylenol but I had to leave to smoke black and mild for pain before they did anything else\". States [facility]. Speaking without signs of distress. States last smoked meth 2 days ago. Smoked weed this am.   "

## 2022-12-25 NOTE — ED PROVIDER NOTES
ED Provider Note    CHIEF COMPLAINT  Chief Complaint   Patient presents with    Flu Like Symptoms     Cough, sore throat, body aches, chills x1 month.     Psych Eval       LIMITATION TO HISTORY   Select: : None    HPI  Kaylie Burciaga is a 37 y.o. female who presents with cough productive of yellow/green sputum, body aches, and chills that she notes have been ongoing for one month. Despite nursing staff chief complaint she denies sore throat. She has not tried any medication for her symptoms. She denies associated chest pain, shortness of breath, fevers, nausea, or vomiting. She smokes tobacco daily. She states that the front of her shins hurt as well as her feet because she walks everywhere. She reports that she was at [facility] earlier today for the same complaint and received tylenol and ibuprofen which were unhelpful. She notes that she was not seen by a physician because she left to smoke a cigarette. She denies leg swelling or calf pain. She has never had a DVT/PE. She does not take hormones. She denies recent immobilization or surgeries. She admits to frequent methamphetamine use, most recently today. She denies any SI or HI.    OUTSIDE HISTORIAN(S):  Select: none    EXTERNAL RECORDS REVIEWED  Select: Other none    REVIEW OF SYSTEMS  See HPI for further details. Productive cough. Chills. Body aches. Shin pain. All other systems are negative.     PAST MEDICAL HISTORY   has a past medical history of ADHD, Anxiety and depression, ASTHMA, bipolar, Blood transfusion (2010), Depression, Hemorrhagic disorder (HCC), Hypothyroidism due to acquired atrophy of thyroid (5/31/2016), Nausea & vomiting (1/15/2012), and Pain.    SURGICAL HISTORY   has a past surgical history that includes gastroscopy with biopsy (11/29/08); other (T & A 5 yrs ago); other abdominal surgery; tonsillectomy; marco by laparoscopy (12/1/08); lap,diagnostic abdomen (N/A, 5/29/2020); hysteroscopy,dx,sep proc (N/A, 5/29/2020);  "hysteroscopy,w/endometrial ablation (N/A, 5/29/2020); salpingectomy (5/29/2020); and dilation and curettage (5/29/2020).    FAMILY HISTORY  Family History   Problem Relation Age of Onset    Diabetes Mother         Insulin    Hypertension Mother     Alcohol/Drug Mother        SOCIAL HISTORY  Social History     Tobacco Use    Smoking status: Every Day     Packs/day: 0.50     Years: 20.00     Pack years: 10.00     Types: Cigarettes    Smokeless tobacco: Never    Tobacco comments:      Patient has cut down to less than half a pack a day.   Vaping Use    Vaping Use: Former   Substance and Sexual Activity    Alcohol use: Yes     Comment: occ    Drug use: Yes     Comment: meth and marijuana    Sexual activity: Yes     Partners: Male     Comment: none       CURRENT MEDICATIONS  Home Medications    **Home medications have not yet been reviewed for this encounter**         ALLERGIES  Allergies   Allergen Reactions    Penicillins Anaphylaxis    Morphine Rash     Rash only       PHYSICAL EXAM  VITAL SIGNS: /82   Pulse (!) 121   Temp 36.7 °C (98 °F) (Temporal)   Resp 18   Ht 1.626 m (5' 4\")   Wt 108 kg (237 lb 3.4 oz)   SpO2 97%   BMI 40.72 kg/m²    Physical Exam  Vitals and nursing note reviewed.   Constitutional:       General: She is not in acute distress.     Appearance: Normal appearance. She is not ill-appearing or toxic-appearing.   HENT:      Head: Normocephalic and atraumatic.      Right Ear: External ear normal.      Left Ear: External ear normal.      Nose: Nose normal. No congestion or rhinorrhea.      Mouth/Throat:      Mouth: Mucous membranes are dry.      Pharynx: No oropharyngeal exudate or posterior oropharyngeal erythema.   Eyes:      Extraocular Movements: Extraocular movements intact.      Conjunctiva/sclera: Conjunctivae normal.      Pupils: Pupils are equal, round, and reactive to light.   Cardiovascular:      Rate and Rhythm: Regular rhythm. Tachycardia present.      Pulses: Normal pulses.     "  Heart sounds: Normal heart sounds. No murmur heard.    No friction rub. No gallop.   Pulmonary:      Effort: Pulmonary effort is normal. No respiratory distress.      Breath sounds: No stridor. Wheezing present. No rhonchi or rales.   Abdominal:      General: Abdomen is flat. Bowel sounds are normal.      Palpations: Abdomen is soft.      Tenderness: There is no abdominal tenderness. There is no guarding.   Musculoskeletal:         General: No tenderness. Normal range of motion.      Cervical back: Normal range of motion and neck supple. No rigidity.      Comments: No lower extremity edema, erythema, crepitus, fluctuance, or tenderness out of proportion to exam. Mild tenderness over anterior aspect of bilateral lower extremities without any overlying ecchymosis or deformity.   Skin:     General: Skin is warm and dry.      Findings: No erythema or rash.   Neurological:      General: No focal deficit present.      Mental Status: She is alert.   Psychiatric:         Mood and Affect: Mood normal.         Behavior: Behavior normal.     DIAGNOSTIC STUDIES / PROCEDURES    LABS  Abnormal Labs Reviewed   CBC WITH DIFFERENTIAL - Abnormal; Notable for the following components:       Result Value    RBC 4.03 (*)     Hemoglobin 11.3 (*)     Hematocrit 34.6 (*)     MCHC 32.7 (*)     RDW 51.9 (*)     Lymphocytes 19.90 (*)     All other components within normal limits   BASIC METABOLIC PANEL - Abnormal; Notable for the following components:    Potassium 3.4 (*)     Glucose 117 (*)     Bun 5 (*)     All other components within normal limits     RADIOLOGY  I have independently interpreted the diagnostic imaging associated with this visit and am waiting the final reading from the radiologist.   DX-CHEST-PORTABLE (1 VIEW)   Final Result         No acute cardiac or pulmonary abnormality is identified.        COURSE & MEDICAL DECISION MAKING  Pertinent Labs & Imaging studies reviewed. (See chart for details)  This is a 37 year old  female here with cough and cold symptoms. She is tachycardic but AF with otherwise normal VS. Appears dehydrated but non-toxic. No cardiac murmurs or rubs but she has diffuse wheezes on lung auscultation. Does admit to smoking. No risk factors for PE. Although she is tachycardic her O2 sats are in the high 90s on room air and she is not in any respiratory distress. She admits to using methamphetamine just prior to coming to the ER and this is the likely etiology of her tachycardia. Regarding the patient's shin pain, she does have some bilateral tenderness but no bony deformity or external evidence of trauma. Low suspicion for bony abnormality/fracture. There is no calf pain or tenderness. No overlying erythema, crepitus, edema, fluctuance, or pain out of proportion to exam. Low suspicion for cellulitis, gangrene, heart failure, abscess, necrotizing fasciitis. Given her tobacco use and wheezing, along with reports of asthma history, will treat for RAD with duoneb and prednisone. Given a dose of tylenol as well as IV fluids for evidence of dehydration. Regarding her need for psychiatric evaluation, she is currently interactive, appropriate, and has no SI/HI. She does not meet criteria for legal hold. Amphetamines are likely making her social situation significantly worse but she is not interested in detox programs.    CBC is without leukocytosis. CMP with minimal abnormalities to include hypokalemia to 3.4, glucose of 117. HCG negative. CXR without acute abnormality.    Patient reevaluated at bedside. She reports improvement in her symptoms but is agitated and requesting discharge. No new SI/HI. She has a history of aggressive behavior in the ED and still does not meet legal hold criteria. Her HR has improved to normal. O2 sats remain in the 90s. No respiratory distress. She is safe for d/c with close outpatient management. Discharged in good and stable condition with prescriptions for albuterol inhaler and  prednisone.     HYDRATION: Based on the patient's presentation of Dehydration and Tachycardia the patient was given IV fluids. IV Hydration was used because oral hydration was not adequate alone. Upon recheck following hydration, the patient was improved.    The patient will return for worsening symptoms and is stable at the time of discharge. The patient verbalizes understanding and will comply.    FINAL IMPRESSION  1. Viral URI with cough           Electronically signed by: Rich Stephenson M.D., 12/25/2022 3:19 PM

## 2022-12-25 NOTE — ED TRIAGE NOTES
"Chief Complaint   Patient presents with    Flu Like Symptoms     Cough, sore throat, body aches, chills x1 month.     Psych Eval     Pt ambulatory to triage with above complaints. Pt is difficult to follow in triage and is constantly mumbling. Pt states \"I need to have thyroid removed, I need to have my ovaries removed but you guys are going to steal my eggs\". Pt states \"I smoke meth and do drugs all the time but that doesn't matter\" and pt continues to state \"I need to wash my face.\" Pt denies SI/HI.     /82   Pulse (!) 121   Temp 36.7 °C (98 °F) (Temporal)   Resp 18   Ht 1.626 m (5' 4\")   Wt 108 kg (237 lb 3.4 oz)   SpO2 97%   BMI 40.72 kg/m²     "

## 2022-12-26 PROCEDURE — RXMED WILLOW AMBULATORY MEDICATION CHARGE: Performed by: EMERGENCY MEDICINE

## 2022-12-26 NOTE — ED NOTES
PT ambulatory in room. Speaking quite loudly and animatedly towards this RN stating she is unwilling to remain in ED. States she wishes to have IV removed at this time. ERP Dr. Stephenson present at bedside. States understanding of discharge instructions. Stable, steady gait.

## 2023-01-04 ENCOUNTER — PHARMACY VISIT (OUTPATIENT)
Dept: PHARMACY | Facility: MEDICAL CENTER | Age: 38
End: 2023-01-04
Payer: COMMERCIAL

## 2023-02-22 PROCEDURE — 99284 EMERGENCY DEPT VISIT MOD MDM: CPT

## 2023-02-22 ASSESSMENT — FIBROSIS 4 INDEX: FIB4 SCORE: 0.41

## 2023-02-23 ENCOUNTER — APPOINTMENT (OUTPATIENT)
Dept: RADIOLOGY | Facility: MEDICAL CENTER | Age: 38
End: 2023-02-23
Attending: EMERGENCY MEDICINE
Payer: MEDICAID

## 2023-02-23 ENCOUNTER — HOSPITAL ENCOUNTER (EMERGENCY)
Facility: MEDICAL CENTER | Age: 38
End: 2023-02-23
Attending: EMERGENCY MEDICINE
Payer: MEDICAID

## 2023-02-23 VITALS
TEMPERATURE: 99.2 F | DIASTOLIC BLOOD PRESSURE: 82 MMHG | HEART RATE: 102 BPM | RESPIRATION RATE: 18 BRPM | HEIGHT: 64 IN | OXYGEN SATURATION: 96 % | BODY MASS INDEX: 36.17 KG/M2 | SYSTOLIC BLOOD PRESSURE: 128 MMHG | WEIGHT: 211.86 LBS
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
## 2023-02-23 DIAGNOSIS — M79.601 RIGHT ARM PAIN: ICD-10-CM

## 2023-02-23 DIAGNOSIS — T07.XXXA MULTIPLE CONTUSIONS: ICD-10-CM

## 2023-02-23 DIAGNOSIS — M25.511 ACUTE PAIN OF RIGHT SHOULDER: ICD-10-CM

## 2023-02-23 DIAGNOSIS — R07.81 RIB PAIN: ICD-10-CM

## 2023-02-23 LAB
APPEARANCE UR: CLEAR
BILIRUB UR QL STRIP.AUTO: NEGATIVE
CANDIDA DNA VAG QL PROBE+SIG AMP: NEGATIVE
COLOR UR: YELLOW
G VAGINALIS DNA VAG QL PROBE+SIG AMP: NEGATIVE
GLUCOSE UR STRIP.AUTO-MCNC: NEGATIVE MG/DL
KETONES UR STRIP.AUTO-MCNC: NEGATIVE MG/DL
LEUKOCYTE ESTERASE UR QL STRIP.AUTO: NEGATIVE
MICRO URNS: NORMAL
NITRITE UR QL STRIP.AUTO: NEGATIVE
PH UR STRIP.AUTO: 6 [PH] (ref 5–8)
PROT UR QL STRIP: NEGATIVE MG/DL
RBC UR QL AUTO: NEGATIVE
SP GR UR STRIP.AUTO: 1.01
T VAGINALIS DNA VAG QL PROBE+SIG AMP: NEGATIVE
UROBILINOGEN UR STRIP.AUTO-MCNC: 0.2 MG/DL

## 2023-02-23 PROCEDURE — 87660 TRICHOMONAS VAGIN DIR PROBE: CPT

## 2023-02-23 PROCEDURE — 87480 CANDIDA DNA DIR PROBE: CPT

## 2023-02-23 PROCEDURE — 73030 X-RAY EXAM OF SHOULDER: CPT | Mod: RT

## 2023-02-23 PROCEDURE — 87510 GARDNER VAG DNA DIR PROBE: CPT

## 2023-02-23 PROCEDURE — 73080 X-RAY EXAM OF ELBOW: CPT | Mod: RT

## 2023-02-23 PROCEDURE — 71045 X-RAY EXAM CHEST 1 VIEW: CPT

## 2023-02-23 PROCEDURE — 81003 URINALYSIS AUTO W/O SCOPE: CPT

## 2023-02-23 RX ORDER — IBUPROFEN 600 MG/1
600 TABLET ORAL ONCE
Status: DISCONTINUED | OUTPATIENT
Start: 2023-02-23 | End: 2023-02-23 | Stop reason: HOSPADM

## 2023-02-23 RX ORDER — ACETAMINOPHEN 325 MG/1
650 TABLET ORAL ONCE
Status: DISCONTINUED | OUTPATIENT
Start: 2023-02-23 | End: 2023-02-23 | Stop reason: HOSPADM
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
## 2023-02-23 NOTE — ED NOTES
Pt walked back to room with a steady gait. Pt changed into gown and given blankets. Chart up for ERP.
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
## 2023-02-23 NOTE — ED NOTES
"Walked pt to the restroom and obtained urine and vaginal swab. Attempted to draw blood (pt insisted on using her R hand). Initially unsuccessful and pt asked needle to be pulled out and then refused further attempts. ERP notified. When this Rn returned to pod, pt heard yelling angrily from her room. This RN went to door and pt verbally aggressive, swearing, saying you didn't pull the needle out when I told you, \"give me my fucking pain medication now\". This RN responded calmly that we dont take demands around here and that she will wait until pt calms down and talks nicely to her.    This Rn retrieved meds and returned a short while later. Pt still yelling angrily and when RN opened door, pt yelled \"I dont want anything from you. I wanna leave. Dont come in here. Shut the front door. Fuck you\". This RN happily complied.     Pt began pacing around the room, putting clothing back on. She grabbed cup of urine left in the room, took a sip and spit it out at the door of the room.     Security called because of escalating pt behaviors. ERP notified of situation, came to room and agreed to discharge patient.   "

## 2023-02-24 ENCOUNTER — HOSPITAL ENCOUNTER (EMERGENCY)
Facility: MEDICAL CENTER | Age: 38
End: 2023-02-25
Attending: STUDENT IN AN ORGANIZED HEALTH CARE EDUCATION/TRAINING PROGRAM
Payer: MEDICAID

## 2023-02-24 DIAGNOSIS — G89.29 CHRONIC LOW BACK PAIN WITHOUT SCIATICA, UNSPECIFIED BACK PAIN LATERALITY: ICD-10-CM

## 2023-02-24 DIAGNOSIS — F15.10 METHAMPHETAMINE ABUSE (HCC): ICD-10-CM

## 2023-02-24 DIAGNOSIS — M54.50 CHRONIC LOW BACK PAIN WITHOUT SCIATICA, UNSPECIFIED BACK PAIN LATERALITY: ICD-10-CM

## 2023-02-24 PROCEDURE — 99284 EMERGENCY DEPT VISIT MOD MDM: CPT

## 2023-02-24 ASSESSMENT — FIBROSIS 4 INDEX: FIB4 SCORE: 0.41

## 2023-02-25 VITALS
HEIGHT: 64 IN | WEIGHT: 210 LBS | DIASTOLIC BLOOD PRESSURE: 87 MMHG | BODY MASS INDEX: 35.85 KG/M2 | HEART RATE: 85 BPM | TEMPERATURE: 97.4 F | RESPIRATION RATE: 16 BRPM | SYSTOLIC BLOOD PRESSURE: 127 MMHG | OXYGEN SATURATION: 96 %

## 2023-02-25 NOTE — ED NOTES
"Pt ambulated to restroom. Came out of restroom agitated and verbally aggressive calling this RN a \"bitch\". Security called to bedside. Pt given opportunity to use cab voucher to shelter, pt stated \"I'm not going there, get me a cab to [facility]\". Pt educated on process of discharge. Pt unhappy with options. Pt provided dry sweatshirt and pants and socks. Pt has comforter and shoes.  Provided discharge instructions, refused to sign.  Pt escorted to lobby with security with steady gait. GCS 15  "
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
## 2023-06-07 NOTE — PATIENT INSTRUCTIONS
Gabapentin:  Start taking 300mg at bedtime for 3-5days    Then, start taking 300mg at dinner and 300mg at bedtime for 3-5 days.    Then, increase to taking 300mg three times a day.    Please call the office with any questions.    
no nausea/no vomiting/no diarrhea/no constipation
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
## 2024-08-28 ENCOUNTER — HOSPITAL ENCOUNTER (OUTPATIENT)
Dept: LAB | Facility: MEDICAL CENTER | Age: 39
End: 2024-08-28
Attending: NURSE PRACTITIONER
Payer: MEDICAID

## 2024-08-28 LAB
25(OH)D3 SERPL-MCNC: 16 NG/ML (ref 30–100)
ALBUMIN SERPL BCP-MCNC: 3.7 G/DL (ref 3.2–4.9)
ALBUMIN/GLOB SERPL: 1.5 G/DL
ALP SERPL-CCNC: 62 U/L (ref 30–99)
ALT SERPL-CCNC: 6 U/L (ref 2–50)
ANION GAP SERPL CALC-SCNC: 11 MMOL/L (ref 7–16)
AST SERPL-CCNC: 15 U/L (ref 12–45)
BASOPHILS # BLD AUTO: 0.6 % (ref 0–1.8)
BASOPHILS # BLD: 0.04 K/UL (ref 0–0.12)
BILIRUB SERPL-MCNC: 0.2 MG/DL (ref 0.1–1.5)
BUN SERPL-MCNC: 4 MG/DL (ref 8–22)
CALCIUM ALBUM COR SERPL-MCNC: 9 MG/DL (ref 8.5–10.5)
CALCIUM SERPL-MCNC: 8.8 MG/DL (ref 8.5–10.5)
CHLORIDE SERPL-SCNC: 104 MMOL/L (ref 96–112)
CO2 SERPL-SCNC: 25 MMOL/L (ref 20–33)
CREAT SERPL-MCNC: 0.78 MG/DL (ref 0.5–1.4)
EOSINOPHIL # BLD AUTO: 0.06 K/UL (ref 0–0.51)
EOSINOPHIL NFR BLD: 0.9 % (ref 0–6.9)
ERYTHROCYTE [DISTWIDTH] IN BLOOD BY AUTOMATED COUNT: 49 FL (ref 35.9–50)
EST. AVERAGE GLUCOSE BLD GHB EST-MCNC: 111 MG/DL
GFR SERPLBLD CREATININE-BSD FMLA CKD-EPI: 98 ML/MIN/1.73 M 2
GLOBULIN SER CALC-MCNC: 2.5 G/DL (ref 1.9–3.5)
GLUCOSE SERPL-MCNC: 99 MG/DL (ref 65–99)
HAV IGM SERPL QL IA: NORMAL
HBA1C MFR BLD: 5.5 % (ref 4–5.6)
HBV CORE IGM SER QL: NORMAL
HBV SURFACE AG SER QL: NORMAL
HCT VFR BLD AUTO: 41.3 % (ref 37–47)
HCV AB SER QL: NORMAL
HGB BLD-MCNC: 12.7 G/DL (ref 12–16)
HIV 1+2 AB+HIV1 P24 AG SERPL QL IA: NORMAL
IMM GRANULOCYTES # BLD AUTO: 0.03 K/UL (ref 0–0.11)
IMM GRANULOCYTES NFR BLD AUTO: 0.5 % (ref 0–0.9)
LYMPHOCYTES # BLD AUTO: 1.46 K/UL (ref 1–4.8)
LYMPHOCYTES NFR BLD: 23 % (ref 22–41)
MCH RBC QN AUTO: 28.3 PG (ref 27–33)
MCHC RBC AUTO-ENTMCNC: 30.8 G/DL (ref 32.2–35.5)
MCV RBC AUTO: 92 FL (ref 81.4–97.8)
MONOCYTES # BLD AUTO: 0.41 K/UL (ref 0–0.85)
MONOCYTES NFR BLD AUTO: 6.5 % (ref 0–13.4)
NEUTROPHILS # BLD AUTO: 4.34 K/UL (ref 1.82–7.42)
NEUTROPHILS NFR BLD: 68.5 % (ref 44–72)
NRBC # BLD AUTO: 0 K/UL
NRBC BLD-RTO: 0 /100 WBC (ref 0–0.2)
PLATELET # BLD AUTO: 330 K/UL (ref 164–446)
PMV BLD AUTO: 11.4 FL (ref 9–12.9)
POTASSIUM SERPL-SCNC: 3.9 MMOL/L (ref 3.6–5.5)
PROT SERPL-MCNC: 6.2 G/DL (ref 6–8.2)
RBC # BLD AUTO: 4.49 M/UL (ref 4.2–5.4)
SODIUM SERPL-SCNC: 140 MMOL/L (ref 135–145)
T PALLIDUM AB SER QL IA: NORMAL
T3FREE SERPL-MCNC: 2.47 PG/ML (ref 2–4.4)
T4 FREE SERPL-MCNC: 1 NG/DL (ref 0.93–1.7)
TSH SERPL-ACNC: 1.48 UIU/ML (ref 0.35–5.5)
WBC # BLD AUTO: 6.3 K/UL (ref 4.8–10.8)

## 2024-08-28 PROCEDURE — 85025 COMPLETE CBC W/AUTO DIFF WBC: CPT

## 2024-08-28 PROCEDURE — 87591 N.GONORRHOEAE DNA AMP PROB: CPT

## 2024-08-28 PROCEDURE — 87389 HIV-1 AG W/HIV-1&-2 AB AG IA: CPT

## 2024-08-28 PROCEDURE — 80074 ACUTE HEPATITIS PANEL: CPT

## 2024-08-28 PROCEDURE — 82306 VITAMIN D 25 HYDROXY: CPT

## 2024-08-28 PROCEDURE — 84443 ASSAY THYROID STIM HORMONE: CPT

## 2024-08-28 PROCEDURE — 36415 COLL VENOUS BLD VENIPUNCTURE: CPT

## 2024-08-28 PROCEDURE — 87491 CHLMYD TRACH DNA AMP PROBE: CPT

## 2024-08-28 PROCEDURE — 84439 ASSAY OF FREE THYROXINE: CPT

## 2024-08-28 PROCEDURE — 80053 COMPREHEN METABOLIC PANEL: CPT

## 2024-08-28 PROCEDURE — 83036 HEMOGLOBIN GLYCOSYLATED A1C: CPT

## 2024-08-28 PROCEDURE — 84481 FREE ASSAY (FT-3): CPT

## 2024-08-28 PROCEDURE — 86780 TREPONEMA PALLIDUM: CPT

## 2024-08-29 LAB
C TRACH DNA SPEC QL NAA+PROBE: NEGATIVE
N GONORRHOEA DNA SPEC QL NAA+PROBE: NEGATIVE
SPECIMEN SOURCE: NORMAL

## 2024-10-07 ENCOUNTER — APPOINTMENT (OUTPATIENT)
Dept: RADIOLOGY | Facility: MEDICAL CENTER | Age: 39
End: 2024-10-07
Attending: EMERGENCY MEDICINE
Payer: MEDICAID

## 2024-10-07 ENCOUNTER — HOSPITAL ENCOUNTER (OUTPATIENT)
Facility: MEDICAL CENTER | Age: 39
End: 2024-10-08
Attending: EMERGENCY MEDICINE | Admitting: STUDENT IN AN ORGANIZED HEALTH CARE EDUCATION/TRAINING PROGRAM
Payer: MEDICAID

## 2024-10-07 ENCOUNTER — APPOINTMENT (OUTPATIENT)
Dept: RADIOLOGY | Facility: MEDICAL CENTER | Age: 39
End: 2024-10-07
Payer: MEDICAID

## 2024-10-07 DIAGNOSIS — J45.41 MODERATE PERSISTENT ASTHMA WITH ACUTE EXACERBATION: Primary | ICD-10-CM

## 2024-10-07 DIAGNOSIS — G89.29 CHRONIC BILATERAL LOW BACK PAIN WITHOUT SCIATICA: ICD-10-CM

## 2024-10-07 DIAGNOSIS — J45.909 ACUTE ASTHMA: ICD-10-CM

## 2024-10-07 DIAGNOSIS — I15.8 OTHER SECONDARY HYPERTENSION: ICD-10-CM

## 2024-10-07 DIAGNOSIS — F41.9 ANXIETY: ICD-10-CM

## 2024-10-07 DIAGNOSIS — M54.50 CHRONIC BILATERAL LOW BACK PAIN WITHOUT SCIATICA: ICD-10-CM

## 2024-10-07 LAB — GLUCOSE BLD STRIP.AUTO-MCNC: 125 MG/DL (ref 65–99)

## 2024-10-07 PROCEDURE — 93005 ELECTROCARDIOGRAM TRACING: CPT

## 2024-10-07 PROCEDURE — 96372 THER/PROPH/DIAG INJ SC/IM: CPT

## 2024-10-07 PROCEDURE — 700101 HCHG RX REV CODE 250: Mod: UD | Performed by: EMERGENCY MEDICINE

## 2024-10-07 PROCEDURE — 82962 GLUCOSE BLOOD TEST: CPT

## 2024-10-07 PROCEDURE — 0241U HCHG SARS-COV-2 COVID-19 NFCT DS RESP RNA 4 TRGT ED POC: CPT

## 2024-10-07 PROCEDURE — 99285 EMERGENCY DEPT VISIT HI MDM: CPT

## 2024-10-07 PROCEDURE — 94640 AIRWAY INHALATION TREATMENT: CPT

## 2024-10-07 PROCEDURE — 94760 N-INVAS EAR/PLS OXIMETRY 1: CPT

## 2024-10-07 PROCEDURE — 71045 X-RAY EXAM CHEST 1 VIEW: CPT

## 2024-10-07 PROCEDURE — 700111 HCHG RX REV CODE 636 W/ 250 OVERRIDE (IP): Performed by: EMERGENCY MEDICINE

## 2024-10-07 RX ORDER — PREDNISONE 20 MG/1
60 TABLET ORAL ONCE
Status: COMPLETED | OUTPATIENT
Start: 2024-10-07 | End: 2024-10-07

## 2024-10-07 RX ORDER — IPRATROPIUM BROMIDE AND ALBUTEROL SULFATE 2.5; .5 MG/3ML; MG/3ML
3 SOLUTION RESPIRATORY (INHALATION)
Status: COMPLETED | OUTPATIENT
Start: 2024-10-07 | End: 2024-10-07

## 2024-10-07 RX ORDER — LORAZEPAM 2 MG/ML
2 INJECTION INTRAMUSCULAR ONCE
Status: COMPLETED | OUTPATIENT
Start: 2024-10-07 | End: 2024-10-07

## 2024-10-07 RX ORDER — MIDAZOLAM HYDROCHLORIDE 1 MG/ML
1 INJECTION INTRAMUSCULAR; INTRAVENOUS ONCE
Status: DISCONTINUED | OUTPATIENT
Start: 2024-10-07 | End: 2024-10-07

## 2024-10-07 RX ORDER — ALBUTEROL SULFATE 90 UG/1
2 INHALANT RESPIRATORY (INHALATION) ONCE
Status: COMPLETED | OUTPATIENT
Start: 2024-10-08 | End: 2024-10-08

## 2024-10-07 RX ADMIN — LORAZEPAM 2 MG: 2 INJECTION INTRAMUSCULAR; INTRAVENOUS at 23:14

## 2024-10-07 RX ADMIN — PREDNISONE 60 MG: 20 TABLET ORAL at 23:11

## 2024-10-07 RX ADMIN — IPRATROPIUM BROMIDE AND ALBUTEROL SULFATE 3 ML: 2.5; .5 SOLUTION RESPIRATORY (INHALATION) at 23:03

## 2024-10-07 ASSESSMENT — FIBROSIS 4 INDEX: FIB4 SCORE: 0.74

## 2024-10-08 ENCOUNTER — PHARMACY VISIT (OUTPATIENT)
Dept: PHARMACY | Facility: MEDICAL CENTER | Age: 39
End: 2024-10-08
Payer: COMMERCIAL

## 2024-10-08 VITALS
BODY MASS INDEX: 45.17 KG/M2 | WEIGHT: 264.55 LBS | HEIGHT: 64 IN | RESPIRATION RATE: 20 BRPM | HEART RATE: 115 BPM | TEMPERATURE: 96.7 F | DIASTOLIC BLOOD PRESSURE: 58 MMHG | OXYGEN SATURATION: 93 % | SYSTOLIC BLOOD PRESSURE: 130 MMHG

## 2024-10-08 PROBLEM — J45.901 ASTHMA EXACERBATION, MILD: Status: ACTIVE | Noted: 2024-10-08

## 2024-10-08 LAB
AMPHET UR QL SCN: POSITIVE
ANION GAP SERPL CALC-SCNC: 21 MMOL/L (ref 7–16)
BARBITURATES UR QL SCN: NEGATIVE
BASOPHILS # BLD AUTO: 0.4 % (ref 0–1.8)
BASOPHILS # BLD: 0.05 K/UL (ref 0–0.12)
BENZODIAZ UR QL SCN: POSITIVE
BUN SERPL-MCNC: 11 MG/DL (ref 8–22)
BZE UR QL SCN: NEGATIVE
CALCIUM SERPL-MCNC: 9.7 MG/DL (ref 8.5–10.5)
CANNABINOIDS UR QL SCN: POSITIVE
CHLORIDE SERPL-SCNC: 105 MMOL/L (ref 96–112)
CO2 SERPL-SCNC: 16 MMOL/L (ref 20–33)
CREAT SERPL-MCNC: 0.79 MG/DL (ref 0.5–1.4)
EKG IMPRESSION: NORMAL
EOSINOPHIL # BLD AUTO: 0 K/UL (ref 0–0.51)
EOSINOPHIL NFR BLD: 0 % (ref 0–6.9)
ERYTHROCYTE [DISTWIDTH] IN BLOOD BY AUTOMATED COUNT: 48.6 FL (ref 35.9–50)
FENTANYL UR QL: NEGATIVE
FLUAV RNA SPEC QL NAA+PROBE: NEGATIVE
FLUBV RNA SPEC QL NAA+PROBE: NEGATIVE
GFR SERPLBLD CREATININE-BSD FMLA CKD-EPI: 96 ML/MIN/1.73 M 2
GLUCOSE SERPL-MCNC: 175 MG/DL (ref 65–99)
HCT VFR BLD AUTO: 37.2 % (ref 37–47)
HGB BLD-MCNC: 12.5 G/DL (ref 12–16)
IMM GRANULOCYTES # BLD AUTO: 0.12 K/UL (ref 0–0.11)
IMM GRANULOCYTES NFR BLD AUTO: 0.9 % (ref 0–0.9)
LYMPHOCYTES # BLD AUTO: 0.69 K/UL (ref 1–4.8)
LYMPHOCYTES NFR BLD: 5.4 % (ref 22–41)
MCH RBC QN AUTO: 29.6 PG (ref 27–33)
MCHC RBC AUTO-ENTMCNC: 33.6 G/DL (ref 32.2–35.5)
MCV RBC AUTO: 88.2 FL (ref 81.4–97.8)
METHADONE UR QL SCN: NEGATIVE
MONOCYTES # BLD AUTO: 0.4 K/UL (ref 0–0.85)
MONOCYTES NFR BLD AUTO: 3.1 % (ref 0–13.4)
NEUTROPHILS # BLD AUTO: 11.59 K/UL (ref 1.82–7.42)
NEUTROPHILS NFR BLD: 90.2 % (ref 44–72)
NRBC # BLD AUTO: 0 K/UL
NRBC BLD-RTO: 0 /100 WBC (ref 0–0.2)
OPIATES UR QL SCN: NEGATIVE
OXYCODONE UR QL SCN: NEGATIVE
PCP UR QL SCN: NEGATIVE
PLATELET # BLD AUTO: 278 K/UL (ref 164–446)
PMV BLD AUTO: 10.2 FL (ref 9–12.9)
POTASSIUM SERPL-SCNC: 3.2 MMOL/L (ref 3.6–5.5)
PROPOXYPH UR QL SCN: NEGATIVE
RBC # BLD AUTO: 4.22 M/UL (ref 4.2–5.4)
RSV RNA SPEC QL NAA+PROBE: NEGATIVE
SARS-COV-2 RNA RESP QL NAA+PROBE: NOTDETECTED
SODIUM SERPL-SCNC: 142 MMOL/L (ref 135–145)
WBC # BLD AUTO: 12.9 K/UL (ref 4.8–10.8)

## 2024-10-08 PROCEDURE — 99223 1ST HOSP IP/OBS HIGH 75: CPT | Mod: 25 | Performed by: STUDENT IN AN ORGANIZED HEALTH CARE EDUCATION/TRAINING PROGRAM

## 2024-10-08 PROCEDURE — 80048 BASIC METABOLIC PNL TOTAL CA: CPT

## 2024-10-08 PROCEDURE — G0378 HOSPITAL OBSERVATION PER HR: HCPCS

## 2024-10-08 PROCEDURE — A9270 NON-COVERED ITEM OR SERVICE: HCPCS | Mod: UD | Performed by: EMERGENCY MEDICINE

## 2024-10-08 PROCEDURE — RXMED WILLOW AMBULATORY MEDICATION CHARGE: Performed by: NURSE PRACTITIONER

## 2024-10-08 PROCEDURE — 700102 HCHG RX REV CODE 250 W/ 637 OVERRIDE(OP): Mod: UD | Performed by: STUDENT IN AN ORGANIZED HEALTH CARE EDUCATION/TRAINING PROGRAM

## 2024-10-08 PROCEDURE — 36415 COLL VENOUS BLD VENIPUNCTURE: CPT

## 2024-10-08 PROCEDURE — 96372 THER/PROPH/DIAG INJ SC/IM: CPT

## 2024-10-08 PROCEDURE — 99406 BEHAV CHNG SMOKING 3-10 MIN: CPT | Performed by: STUDENT IN AN ORGANIZED HEALTH CARE EDUCATION/TRAINING PROGRAM

## 2024-10-08 PROCEDURE — 96365 THER/PROPH/DIAG IV INF INIT: CPT

## 2024-10-08 PROCEDURE — A9270 NON-COVERED ITEM OR SERVICE: HCPCS | Mod: UD | Performed by: STUDENT IN AN ORGANIZED HEALTH CARE EDUCATION/TRAINING PROGRAM

## 2024-10-08 PROCEDURE — 700111 HCHG RX REV CODE 636 W/ 250 OVERRIDE (IP): Mod: JZ,UD | Performed by: STUDENT IN AN ORGANIZED HEALTH CARE EDUCATION/TRAINING PROGRAM

## 2024-10-08 PROCEDURE — 96375 TX/PRO/DX INJ NEW DRUG ADDON: CPT

## 2024-10-08 PROCEDURE — 700101 HCHG RX REV CODE 250: Mod: UD | Performed by: EMERGENCY MEDICINE

## 2024-10-08 PROCEDURE — 700105 HCHG RX REV CODE 258: Mod: UD | Performed by: EMERGENCY MEDICINE

## 2024-10-08 PROCEDURE — 85025 COMPLETE CBC W/AUTO DIFF WBC: CPT

## 2024-10-08 PROCEDURE — 700111 HCHG RX REV CODE 636 W/ 250 OVERRIDE (IP): Mod: UD | Performed by: EMERGENCY MEDICINE

## 2024-10-08 PROCEDURE — 94640 AIRWAY INHALATION TREATMENT: CPT

## 2024-10-08 PROCEDURE — 700102 HCHG RX REV CODE 250 W/ 637 OVERRIDE(OP): Mod: UD | Performed by: EMERGENCY MEDICINE

## 2024-10-08 PROCEDURE — 80307 DRUG TEST PRSMV CHEM ANLYZR: CPT

## 2024-10-08 PROCEDURE — 99406 BEHAV CHNG SMOKING 3-10 MIN: CPT

## 2024-10-08 PROCEDURE — 94644 CONT INHLJ TX 1ST HOUR: CPT

## 2024-10-08 RX ORDER — HYDRALAZINE HYDROCHLORIDE 20 MG/ML
10 INJECTION INTRAMUSCULAR; INTRAVENOUS EVERY 4 HOURS PRN
Status: DISCONTINUED | OUTPATIENT
Start: 2024-10-08 | End: 2024-10-08 | Stop reason: HOSPADM

## 2024-10-08 RX ORDER — METHYLPREDNISOLONE SODIUM SUCCINATE 125 MG/2ML
125 INJECTION, POWDER, LYOPHILIZED, FOR SOLUTION INTRAMUSCULAR; INTRAVENOUS ONCE
Status: COMPLETED | OUTPATIENT
Start: 2024-10-08 | End: 2024-10-08

## 2024-10-08 RX ORDER — PREDNISONE 20 MG/1
40 TABLET ORAL DAILY
Qty: 8 TABLET | Refills: 0 | Status: SHIPPED | OUTPATIENT
Start: 2024-10-09

## 2024-10-08 RX ORDER — IPRATROPIUM BROMIDE AND ALBUTEROL SULFATE 2.5; .5 MG/3ML; MG/3ML
3 SOLUTION RESPIRATORY (INHALATION)
Status: DISCONTINUED | OUTPATIENT
Start: 2024-10-08 | End: 2024-10-08 | Stop reason: HOSPADM

## 2024-10-08 RX ORDER — ALBUTEROL SULFATE 90 UG/1
2 INHALANT RESPIRATORY (INHALATION) EVERY 4 HOURS PRN
Qty: 8.5 G | Refills: 0 | Status: SHIPPED | OUTPATIENT
Start: 2024-10-08 | End: 2024-10-24

## 2024-10-08 RX ORDER — GABAPENTIN 300 MG/1
300 CAPSULE ORAL 3 TIMES DAILY
Status: DISCONTINUED | OUTPATIENT
Start: 2024-10-08 | End: 2024-10-08 | Stop reason: HOSPADM

## 2024-10-08 RX ORDER — BUPROPION HYDROCHLORIDE 300 MG/1
300 TABLET ORAL EVERY MORNING
Status: DISCONTINUED | OUTPATIENT
Start: 2024-10-08 | End: 2024-10-08

## 2024-10-08 RX ORDER — SODIUM CHLORIDE 9 MG/ML
1000 INJECTION, SOLUTION INTRAVENOUS ONCE
Status: COMPLETED | OUTPATIENT
Start: 2024-10-08 | End: 2024-10-08

## 2024-10-08 RX ORDER — PRAZOSIN HYDROCHLORIDE 2 MG/1
2 CAPSULE ORAL NIGHTLY
Qty: 30 CAPSULE | Refills: 0 | Status: SHIPPED | OUTPATIENT
Start: 2024-10-08 | End: 2024-11-07

## 2024-10-08 RX ORDER — LORAZEPAM 2 MG/1
2 TABLET ORAL ONCE
Status: COMPLETED | OUTPATIENT
Start: 2024-10-08 | End: 2024-10-08

## 2024-10-08 RX ORDER — MAGNESIUM SULFATE HEPTAHYDRATE 40 MG/ML
2 INJECTION, SOLUTION INTRAVENOUS ONCE
Status: COMPLETED | OUTPATIENT
Start: 2024-10-08 | End: 2024-10-08

## 2024-10-08 RX ORDER — BUPROPION HYDROCHLORIDE 150 MG/1
150 TABLET, EXTENDED RELEASE ORAL 2 TIMES DAILY
Status: DISCONTINUED | OUTPATIENT
Start: 2024-10-08 | End: 2024-10-08 | Stop reason: HOSPADM

## 2024-10-08 RX ORDER — ALBUTEROL SULFATE 90 UG/1
2 INHALANT RESPIRATORY (INHALATION)
Status: DISCONTINUED | OUTPATIENT
Start: 2024-10-08 | End: 2024-10-08 | Stop reason: HOSPADM

## 2024-10-08 RX ORDER — POTASSIUM CHLORIDE 1500 MG/1
40 TABLET, EXTENDED RELEASE ORAL ONCE
Status: COMPLETED | OUTPATIENT
Start: 2024-10-08 | End: 2024-10-08

## 2024-10-08 RX ORDER — GABAPENTIN 300 MG/1
300 CAPSULE ORAL 3 TIMES DAILY
Qty: 90 CAPSULE | Refills: 0 | Status: SHIPPED | OUTPATIENT
Start: 2024-10-08 | End: 2024-11-07

## 2024-10-08 RX ORDER — PREDNISONE 20 MG/1
40 TABLET ORAL DAILY
Status: DISCONTINUED | OUTPATIENT
Start: 2024-10-08 | End: 2024-10-08 | Stop reason: HOSPADM

## 2024-10-08 RX ORDER — IPRATROPIUM BROMIDE AND ALBUTEROL SULFATE 2.5; .5 MG/3ML; MG/3ML
3 SOLUTION RESPIRATORY (INHALATION)
Status: DISCONTINUED | OUTPATIENT
Start: 2024-10-08 | End: 2024-10-08

## 2024-10-08 RX ORDER — ALBUTEROL SULFATE 90 UG/1
2 INHALANT RESPIRATORY (INHALATION) EVERY 6 HOURS PRN
Qty: 8.5 G | Refills: 0 | Status: SHIPPED | OUTPATIENT
Start: 2024-10-08

## 2024-10-08 RX ORDER — TRAZODONE HYDROCHLORIDE 50 MG/1
50 TABLET, FILM COATED ORAL NIGHTLY PRN
Status: DISCONTINUED | OUTPATIENT
Start: 2024-10-08 | End: 2024-10-08 | Stop reason: HOSPADM

## 2024-10-08 RX ORDER — RISPERIDONE 2 MG/1
4 TABLET ORAL
Status: DISCONTINUED | OUTPATIENT
Start: 2024-10-08 | End: 2024-10-08 | Stop reason: HOSPADM

## 2024-10-08 RX ORDER — FLUTICASONE PROPIONATE 110 UG/1
2 AEROSOL, METERED RESPIRATORY (INHALATION)
Status: DISCONTINUED | OUTPATIENT
Start: 2024-10-08 | End: 2024-10-08 | Stop reason: HOSPADM

## 2024-10-08 RX ORDER — NICOTINE 21 MG/24HR
1 PATCH, TRANSDERMAL 24 HOURS TRANSDERMAL EVERY 24 HOURS
Qty: 7 PATCH | Refills: 0 | Status: SHIPPED | OUTPATIENT
Start: 2024-10-08 | End: 2024-10-15

## 2024-10-08 RX ORDER — ENOXAPARIN SODIUM 100 MG/ML
40 INJECTION SUBCUTANEOUS DAILY
Status: DISCONTINUED | OUTPATIENT
Start: 2024-10-08 | End: 2024-10-08 | Stop reason: HOSPADM

## 2024-10-08 RX ORDER — PREDNISONE 20 MG/1
40 TABLET ORAL DAILY
Qty: 6 TABLET | Refills: 0 | Status: SHIPPED | OUTPATIENT
Start: 2024-10-08 | End: 2024-10-11

## 2024-10-08 RX ORDER — ALBUTEROL SULFATE 5 MG/ML
5 SOLUTION RESPIRATORY (INHALATION) ONCE
Status: COMPLETED | OUTPATIENT
Start: 2024-10-08 | End: 2024-10-08

## 2024-10-08 RX ORDER — FLUTICASONE PROPIONATE 110 UG/1
2 AEROSOL, METERED RESPIRATORY (INHALATION) EVERY 12 HOURS
Qty: 12 G | Refills: 1 | Status: SHIPPED | OUTPATIENT
Start: 2024-10-08

## 2024-10-08 RX ORDER — PRAZOSIN HYDROCHLORIDE 2 MG/1
2 CAPSULE ORAL EVERY EVENING
Status: DISCONTINUED | OUTPATIENT
Start: 2024-10-08 | End: 2024-10-08 | Stop reason: HOSPADM

## 2024-10-08 RX ORDER — POTASSIUM CHLORIDE 7.45 MG/ML
10 INJECTION INTRAVENOUS
Status: DISCONTINUED | OUTPATIENT
Start: 2024-10-08 | End: 2024-10-08

## 2024-10-08 RX ORDER — NICOTINE 21 MG/24HR
14 PATCH, TRANSDERMAL 24 HOURS TRANSDERMAL
Status: DISCONTINUED | OUTPATIENT
Start: 2024-10-08 | End: 2024-10-08 | Stop reason: HOSPADM

## 2024-10-08 RX ADMIN — GABAPENTIN 300 MG: 300 CAPSULE ORAL at 05:06

## 2024-10-08 RX ADMIN — NICOTINE 14 MG: 14 PATCH TRANSDERMAL at 05:06

## 2024-10-08 RX ADMIN — RISPERIDONE 4 MG: 2 TABLET, FILM COATED ORAL at 04:26

## 2024-10-08 RX ADMIN — BUPROPION HYDROCHLORIDE 150 MG: 150 TABLET, FILM COATED, EXTENDED RELEASE ORAL at 05:06

## 2024-10-08 RX ADMIN — ALBUTEROL SULFATE 2 PUFF: 90 INHALANT RESPIRATORY (INHALATION) at 05:16

## 2024-10-08 RX ADMIN — ENOXAPARIN SODIUM 40 MG: 100 INJECTION SUBCUTANEOUS at 04:15

## 2024-10-08 RX ADMIN — LORAZEPAM 2 MG: 2 TABLET ORAL at 00:49

## 2024-10-08 RX ADMIN — SODIUM CHLORIDE 1000 ML: 9 INJECTION, SOLUTION INTRAVENOUS at 05:00

## 2024-10-08 RX ADMIN — Medication 10 MG/HR: at 01:21

## 2024-10-08 RX ADMIN — POTASSIUM CHLORIDE 40 MEQ: 1500 TABLET, EXTENDED RELEASE ORAL at 07:49

## 2024-10-08 RX ADMIN — GABAPENTIN 300 MG: 300 CAPSULE ORAL at 12:06

## 2024-10-08 RX ADMIN — PREDNISONE 40 MG: 20 TABLET ORAL at 05:06

## 2024-10-08 RX ADMIN — METHYLPREDNISOLONE SODIUM SUCCINATE 125 MG: 125 INJECTION, POWDER, FOR SOLUTION INTRAMUSCULAR; INTRAVENOUS at 03:28

## 2024-10-08 RX ADMIN — ALBUTEROL SULFATE 2 PUFF: 90 AEROSOL, METERED RESPIRATORY (INHALATION) at 00:02

## 2024-10-08 RX ADMIN — ALBUTEROL SULFATE 5 MG: 2.5 SOLUTION RESPIRATORY (INHALATION) at 00:18

## 2024-10-08 RX ADMIN — MAGNESIUM SULFATE HEPTAHYDRATE 2 G: 2 INJECTION, SOLUTION INTRAVENOUS at 03:33

## 2024-10-08 SDOH — ECONOMIC STABILITY: TRANSPORTATION INSECURITY
IN THE PAST 12 MONTHS, HAS LACK OF RELIABLE TRANSPORTATION KEPT YOU FROM MEDICAL APPOINTMENTS, MEETINGS, WORK OR FROM GETTING THINGS NEEDED FOR DAILY LIVING?: PATIENT DECLINED

## 2024-10-08 SDOH — ECONOMIC STABILITY: TRANSPORTATION INSECURITY
IN THE PAST 12 MONTHS, HAS THE LACK OF TRANSPORTATION KEPT YOU FROM MEDICAL APPOINTMENTS OR FROM GETTING MEDICATIONS?: PATIENT DECLINED

## 2024-10-08 ASSESSMENT — COPD QUESTIONNAIRES
DURING THE PAST 4 WEEKS HOW MUCH DID YOU FEEL SHORT OF BREATH: NONE/LITTLE OF THE TIME
HAVE YOU SMOKED AT LEAST 100 CIGARETTES IN YOUR ENTIRE LIFE: NO/DON'T KNOW
COPD SCREENING SCORE: 0
DO YOU EVER COUGH UP ANY MUCUS OR PHLEGM?: NO/ONLY WITH OCCASIONAL COLDS OR INFECTIONS

## 2024-10-08 ASSESSMENT — SOCIAL DETERMINANTS OF HEALTH (SDOH)
WITHIN THE LAST YEAR, HAVE YOU BEEN HUMILIATED OR EMOTIONALLY ABUSED IN OTHER WAYS BY YOUR PARTNER OR EX-PARTNER?: PATIENT DECLINED
WITHIN THE PAST 12 MONTHS, YOU WORRIED THAT YOUR FOOD WOULD RUN OUT BEFORE YOU GOT THE MONEY TO BUY MORE: PATIENT DECLINED
IN THE PAST 12 MONTHS, HAS THE ELECTRIC, GAS, OIL, OR WATER COMPANY THREATENED TO SHUT OFF SERVICE IN YOUR HOME?: PATIENT DECLINED
WITHIN THE LAST YEAR, HAVE TO BEEN RAPED OR FORCED TO HAVE ANY KIND OF SEXUAL ACTIVITY BY YOUR PARTNER OR EX-PARTNER?: PATIENT DECLINED
WITHIN THE PAST 12 MONTHS, THE FOOD YOU BOUGHT JUST DIDN'T LAST AND YOU DIDN'T HAVE MONEY TO GET MORE: PATIENT DECLINED
WITHIN THE LAST YEAR, HAVE YOU BEEN KICKED, HIT, SLAPPED, OR OTHERWISE PHYSICALLY HURT BY YOUR PARTNER OR EX-PARTNER?: PATIENT DECLINED
WITHIN THE LAST YEAR, HAVE YOU BEEN AFRAID OF YOUR PARTNER OR EX-PARTNER?: PATIENT DECLINED

## 2024-10-08 ASSESSMENT — ENCOUNTER SYMPTOMS
NAUSEA: 0
PHOTOPHOBIA: 0
PALPITATIONS: 0
DOUBLE VISION: 0
FEVER: 0
EYE DISCHARGE: 0
SPUTUM PRODUCTION: 0
SHORTNESS OF BREATH: 1
HEADACHES: 0
BACK PAIN: 0
DIZZINESS: 0
HEMOPTYSIS: 0
VOMITING: 0
CHILLS: 0
ABDOMINAL PAIN: 0
WHEEZING: 1
ORTHOPNEA: 0
NERVOUS/ANXIOUS: 1
NECK PAIN: 0
HALLUCINATIONS: 0
COUGH: 0
DIARRHEA: 0
EYE PAIN: 0

## 2024-10-08 ASSESSMENT — LIFESTYLE VARIABLES
DOES PATIENT WANT TO STOP DRINKING: NO
CONSUMPTION TOTAL: NEGATIVE
ALCOHOL_USE: NO
TOTAL SCORE: 0
HAVE YOU EVER FELT YOU SHOULD CUT DOWN ON YOUR DRINKING: NO
EVER FELT BAD OR GUILTY ABOUT YOUR DRINKING: NO
AVERAGE NUMBER OF DAYS PER WEEK YOU HAVE A DRINK CONTAINING ALCOHOL: 0
TOTAL SCORE: 0
SUBSTANCE_ABUSE: 0
EVER HAD A DRINK FIRST THING IN THE MORNING TO STEADY YOUR NERVES TO GET RID OF A HANGOVER: NO
HOW MANY TIMES IN THE PAST YEAR HAVE YOU HAD 5 OR MORE DRINKS IN A DAY: 0
HAVE PEOPLE ANNOYED YOU BY CRITICIZING YOUR DRINKING: NO
ON A TYPICAL DAY WHEN YOU DRINK ALCOHOL HOW MANY DRINKS DO YOU HAVE: 0
TOTAL SCORE: 0

## 2024-10-08 ASSESSMENT — PATIENT HEALTH QUESTIONNAIRE - PHQ9
SUM OF ALL RESPONSES TO PHQ9 QUESTIONS 1 AND 2: 0
2. FEELING DOWN, DEPRESSED, IRRITABLE, OR HOPELESS: NOT AT ALL
1. LITTLE INTEREST OR PLEASURE IN DOING THINGS: NOT AT ALL

## 2024-10-08 ASSESSMENT — FIBROSIS 4 INDEX: FIB4 SCORE: 0.88

## 2024-10-08 ASSESSMENT — PAIN DESCRIPTION - PAIN TYPE: TYPE: ACUTE PAIN

## 2025-02-19 ENCOUNTER — HOSPITAL ENCOUNTER (OUTPATIENT)
Dept: LAB | Facility: MEDICAL CENTER | Age: 40
End: 2025-02-19
Attending: NURSE PRACTITIONER
Payer: MEDICAID

## 2025-02-19 LAB
HAV IGM SERPL QL IA: NORMAL
HBV CORE IGM SER QL: NORMAL
HBV SURFACE AG SER QL: NORMAL
HCV AB SER QL: NORMAL
HIV 1+2 AB+HIV1 P24 AG SERPL QL IA: NORMAL
T PALLIDUM AB SER QL IA: NORMAL

## 2025-02-19 PROCEDURE — 87661 TRICHOMONAS VAGINALIS AMPLIF: CPT

## 2025-02-19 PROCEDURE — 87491 CHLMYD TRACH DNA AMP PROBE: CPT

## 2025-02-19 PROCEDURE — 87389 HIV-1 AG W/HIV-1&-2 AB AG IA: CPT

## 2025-02-19 PROCEDURE — 87591 N.GONORRHOEAE DNA AMP PROB: CPT

## 2025-02-19 PROCEDURE — 80074 ACUTE HEPATITIS PANEL: CPT

## 2025-02-19 PROCEDURE — 86780 TREPONEMA PALLIDUM: CPT

## 2025-02-19 PROCEDURE — 36415 COLL VENOUS BLD VENIPUNCTURE: CPT

## 2025-02-21 LAB
SPEC CONTAINER SPEC: NORMAL
SPECIMEN SOURCE: NORMAL
T VAGINALIS RRNA SPEC QL NAA+PROBE: NEGATIVE

## (undated) DEVICE — SENSOR SPO2 NEO LNCS ADHESIVE (20/BX) SEE USER NOTES

## (undated) DEVICE — SUTURE 0 VICRYL PLUS UR-6 - 27 INCH (36/BX)

## (undated) DEVICE — CATHETER IV 20 GA X 1-1/4 ---SURG.& SDS ONLY--- (50EA/BX)

## (undated) DEVICE — Device

## (undated) DEVICE — GLOVE BIOGEL SZ 7 SURGICAL PF LTX - (50PR/BX 4BX/CA)

## (undated) DEVICE — GOWN SURGEONS X-LARGE - DISP. (30/CA)

## (undated) DEVICE — SODIUM CHL IRRIGATION 0.9% 1000ML (12EA/CA)

## (undated) DEVICE — ELECTRODE MONOPOLAR ANGLED CUTTING LOOP DIAM 0.35 YELLOW 24FR (6EA/PK)

## (undated) DEVICE — WATER IRRIGATION STERILE 1000ML (12EA/CA)

## (undated) DEVICE — DRAPE UNDER BUTTOCKS FLUID - (20/CA)

## (undated) DEVICE — GELPORT 120MM - (1/EA)

## (undated) DEVICE — SYRINGE 10 ML CONTROL LL (25EA/BX 4BX/CA)

## (undated) DEVICE — DRESSING NON-ADHERING 8 X 3 - (50/BX)

## (undated) DEVICE — KIT HTA GENESYS - (5/BX)

## (undated) DEVICE — TRAY SRGPRP PVP IOD WT PRP - (20/CA)

## (undated) DEVICE — ELECTRODE DUAL RETURN W/ CORD - (50/PK)

## (undated) DEVICE — TUBE E-T HI-LO CUFF 7.5MM (10EA/PK)

## (undated) DEVICE — SUTURE 4-0 MONOCRYL PLUS PS-2 - 27 INCH (36/BX)

## (undated) DEVICE — HEAD HOLDER JUNIOR/ADULT

## (undated) DEVICE — GOWN SURGICAL XX-LARGE - (28EA/CA) SIRUS NON REINFORCED

## (undated) DEVICE — NEPTUNE 4 PORT MANIFOLD - (20/PK)

## (undated) DEVICE — MANIFOLD NEPTUNE 1 PORT (20/PK)

## (undated) DEVICE — PROTECTOR ULNA NERVE - (36PR/CA)

## (undated) DEVICE — GVL 3 STAT DISPOSABLE - (10/BX)

## (undated) DEVICE — SET SUCTION/IRRIGATION WITH DISPOSABLE TIP (6/CA )PART #0250-070-520 IS A SUB

## (undated) DEVICE — PAD SANITARY 11IN MAXI IND WRAPPED  (12EA/PK 24PK/CA)

## (undated) DEVICE — SOLUTION SORBITOL 3000ML (4/CA)

## (undated) DEVICE — GOWN WARMING STANDARD FLEX - (30/CA)

## (undated) DEVICE — SLEEVE, VASO, THIGH, MED

## (undated) DEVICE — TROCAR STEP 5MM - (3/CA)

## (undated) DEVICE — TUBING INFLOW HYSTEROSCOPY (10EA/CA)

## (undated) DEVICE — ADHESIVE DERMABOND HVD MINI (12EA/BX)

## (undated) DEVICE — TRAY FOLEY CATHETER STATLOCK 16FR SURESTEP  (10EA/CA)

## (undated) DEVICE — ARMREST CRADLE FOAM - (2PR/PK 12PR/CA)

## (undated) DEVICE — BANDAID SHEER STRIP 3/4 IN (100EA/BX 12BX/CA)

## (undated) DEVICE — SODIUM CHL. IRRIGATION 0.9% 3000ML (4EA/CA 65CA/PF)

## (undated) DEVICE — NEEDLE INSUFFLATION FOR STEP - (12/BX)

## (undated) DEVICE — TUBING OUTFLOW HYSTEROSCPY (10EA/BX)

## (undated) DEVICE — LIGASURE 5MM BLUNT TIP LONG - 44CM (6EA/PK)

## (undated) DEVICE — TUBING CLEARLINK DUO-VENT - C-FLO (48EA/CA)

## (undated) DEVICE — LACTATED RINGERS INJ 1000 ML - (14EA/CA 60CA/PF)

## (undated) DEVICE — KIT  I.V. START (100EA/CA)

## (undated) DEVICE — CANISTER SUCTION RIGID RED 1500CC (40EA/CA)

## (undated) DEVICE — CHLORAPREP 26 ML APPLICATOR - ORANGE TINT(25/CA)

## (undated) DEVICE — DRAPE 36X28IN RAD CARM BND BG - (25/CA) O

## (undated) DEVICE — PACK LAPAROSCOPY - (1/CA)

## (undated) DEVICE — ELECTRODE 850 FOAM ADHESIVE - HYDROGEL RADIOTRNSPRNT (50/PK)

## (undated) DEVICE — CANISTER SUCTION 3000ML MECHANICAL FILTER AUTO SHUTOFF MEDI-VAC NONSTERILE LF DISP  (40EA/CA)

## (undated) DEVICE — SUCTION INSTRUMENT YANKAUER BULBOUS TIP W/O VENT (50EA/CA)

## (undated) DEVICE — GLOVE BIOGEL SZ 7.5 SURGICAL PF LTX - (50PR/BX 4BX/CA)

## (undated) DEVICE — MASK ANESTHESIA ADULT  - (100/CA)

## (undated) DEVICE — DRAPE MAYO STAND - (30/CA)

## (undated) DEVICE — SUTURE GENERAL

## (undated) DEVICE — BANDAID X-LARGE 2 X 4 IN LF (50EA/BX)

## (undated) DEVICE — DRAPE VAGINAL BIB W/ POUCH (10EA/CA)

## (undated) DEVICE — TROCAR STEP 11MM - (3/CA)

## (undated) DEVICE — KIT ANESTHESIA W/CIRCUIT & 3/LT BAG W/FILTER (20EA/CA)

## (undated) DEVICE — NEEDLE SPINAL NON-SAFETY 22 GA X 3 (25EA/BX)"

## (undated) DEVICE — SET LEADWIRE 5 LEAD BEDSIDE DISPOSABLE ECG (1SET OF 5/EA)

## (undated) DEVICE — TUBE CONNECTING SUCTION - CLEAR PLASTIC STERILE 72 IN (50EA/CA)